# Patient Record
Sex: FEMALE | Race: WHITE | NOT HISPANIC OR LATINO | Employment: UNEMPLOYED | URBAN - METROPOLITAN AREA
[De-identification: names, ages, dates, MRNs, and addresses within clinical notes are randomized per-mention and may not be internally consistent; named-entity substitution may affect disease eponyms.]

---

## 2020-12-16 ENCOUNTER — OFFICE VISIT - HEALTHEAST (OUTPATIENT)
Dept: FAMILY MEDICINE | Facility: CLINIC | Age: 60
End: 2020-12-16

## 2020-12-16 DIAGNOSIS — J31.0 CHRONIC RHINITIS: ICD-10-CM

## 2020-12-16 DIAGNOSIS — Z90.49 S/P CHOLECYSTECTOMY: ICD-10-CM

## 2020-12-16 DIAGNOSIS — E03.8 OTHER SPECIFIED HYPOTHYROIDISM: ICD-10-CM

## 2020-12-16 DIAGNOSIS — G40.A09 NONINTRACTABLE ABSENCE EPILEPSY WITHOUT STATUS EPILEPTICUS (H): ICD-10-CM

## 2020-12-16 DIAGNOSIS — R06.02 SHORTNESS OF BREATH: ICD-10-CM

## 2020-12-16 RX ORDER — CARBAMAZEPINE 200 MG/1
200 TABLET ORAL 2 TIMES DAILY
Qty: 180 TABLET | Refills: 1 | Status: SHIPPED | OUTPATIENT
Start: 2020-12-16 | End: 2021-06-16

## 2020-12-16 ASSESSMENT — MIFFLIN-ST. JEOR: SCORE: 1158.29

## 2021-01-18 ENCOUNTER — COMMUNICATION - HEALTHEAST (OUTPATIENT)
Dept: FAMILY MEDICINE | Facility: CLINIC | Age: 61
End: 2021-01-18

## 2021-01-18 DIAGNOSIS — R06.02 SHORTNESS OF BREATH: ICD-10-CM

## 2021-01-18 RX ORDER — ALBUTEROL SULFATE 90 UG/1
AEROSOL, METERED RESPIRATORY (INHALATION)
Qty: 8.5 G | Refills: 1 | Status: SHIPPED | OUTPATIENT
Start: 2021-01-18 | End: 2021-12-14

## 2021-02-24 ENCOUNTER — OFFICE VISIT - HEALTHEAST (OUTPATIENT)
Dept: FAMILY MEDICINE | Facility: CLINIC | Age: 61
End: 2021-02-24

## 2021-02-24 DIAGNOSIS — R20.8 BURNING SENSATION OF FEET: ICD-10-CM

## 2021-02-24 DIAGNOSIS — Z12.31 VISIT FOR SCREENING MAMMOGRAM: ICD-10-CM

## 2021-02-24 DIAGNOSIS — Z90.49 HISTORY OF CHOLECYSTECTOMY: ICD-10-CM

## 2021-02-24 DIAGNOSIS — R79.9 ABNORMAL FINDING OF BLOOD CHEMISTRY, UNSPECIFIED: ICD-10-CM

## 2021-02-24 DIAGNOSIS — E03.8 OTHER SPECIFIED HYPOTHYROIDISM: ICD-10-CM

## 2021-02-24 DIAGNOSIS — H61.21 IMPACTED CERUMEN OF RIGHT EAR: ICD-10-CM

## 2021-02-24 DIAGNOSIS — R79.9 ABNORMAL BLOOD CHEMISTRY TEST: ICD-10-CM

## 2021-02-24 LAB
ALBUMIN SERPL-MCNC: 4.1 G/DL (ref 3.5–5)
ALP SERPL-CCNC: 120 U/L (ref 45–120)
ALT SERPL W P-5'-P-CCNC: 19 U/L (ref 0–45)
ANION GAP SERPL CALCULATED.3IONS-SCNC: 12 MMOL/L (ref 5–18)
AST SERPL W P-5'-P-CCNC: 23 U/L (ref 0–40)
BILIRUB SERPL-MCNC: 0.2 MG/DL (ref 0–1)
BUN SERPL-MCNC: 10 MG/DL (ref 8–22)
CALCIUM SERPL-MCNC: 8.7 MG/DL (ref 8.5–10.5)
CHLORIDE BLD-SCNC: 104 MMOL/L (ref 98–107)
CO2 SERPL-SCNC: 26 MMOL/L (ref 22–31)
CREAT SERPL-MCNC: 0.94 MG/DL (ref 0.6–1.1)
ERYTHROCYTE [DISTWIDTH] IN BLOOD BY AUTOMATED COUNT: 13.5 % (ref 11–14.5)
GFR SERPL CREATININE-BSD FRML MDRD: >60 ML/MIN/1.73M2
GLUCOSE BLD-MCNC: 100 MG/DL (ref 70–125)
HBA1C MFR BLD: 5.1 %
HCT VFR BLD AUTO: 42.7 % (ref 35–47)
HGB BLD-MCNC: 13.6 G/DL (ref 12–16)
MCH RBC QN AUTO: 32 PG (ref 27–34)
MCHC RBC AUTO-ENTMCNC: 31.9 G/DL (ref 32–36)
MCV RBC AUTO: 101 FL (ref 80–100)
PLATELET # BLD AUTO: 160 THOU/UL (ref 140–440)
PMV BLD AUTO: 10.3 FL (ref 7–10)
POTASSIUM BLD-SCNC: 4 MMOL/L (ref 3.5–5)
PROT SERPL-MCNC: 7.4 G/DL (ref 6–8)
RBC # BLD AUTO: 4.25 MILL/UL (ref 3.8–5.4)
SODIUM SERPL-SCNC: 142 MMOL/L (ref 136–145)
TSH SERPL DL<=0.005 MIU/L-ACNC: 3.37 UIU/ML (ref 0.3–5)
VIT B12 SERPL-MCNC: 356 PG/ML (ref 213–816)
WBC: 6.1 THOU/UL (ref 4–11)

## 2021-03-01 ENCOUNTER — COMMUNICATION - HEALTHEAST (OUTPATIENT)
Dept: SCHEDULING | Facility: CLINIC | Age: 61
End: 2021-03-01

## 2021-03-04 ENCOUNTER — COMMUNICATION - HEALTHEAST (OUTPATIENT)
Dept: FAMILY MEDICINE | Facility: CLINIC | Age: 61
End: 2021-03-04

## 2021-03-08 ENCOUNTER — OFFICE VISIT - HEALTHEAST (OUTPATIENT)
Dept: FAMILY MEDICINE | Facility: CLINIC | Age: 61
End: 2021-03-08

## 2021-03-08 DIAGNOSIS — E03.8 OTHER SPECIFIED HYPOTHYROIDISM: ICD-10-CM

## 2021-03-22 ENCOUNTER — COMMUNICATION - HEALTHEAST (OUTPATIENT)
Dept: FAMILY MEDICINE | Facility: CLINIC | Age: 61
End: 2021-03-22

## 2021-03-28 ENCOUNTER — COMMUNICATION - HEALTHEAST (OUTPATIENT)
Dept: FAMILY MEDICINE | Facility: CLINIC | Age: 61
End: 2021-03-28

## 2021-03-28 DIAGNOSIS — J31.0 CHRONIC RHINITIS: ICD-10-CM

## 2021-03-29 RX ORDER — CETIRIZINE HYDROCHLORIDE 10 MG/1
TABLET ORAL
Qty: 90 TABLET | Refills: 3 | Status: SHIPPED | OUTPATIENT
Start: 2021-03-29 | End: 2022-03-17

## 2021-04-23 ENCOUNTER — COMMUNICATION - HEALTHEAST (OUTPATIENT)
Dept: FAMILY MEDICINE | Facility: CLINIC | Age: 61
End: 2021-04-23

## 2021-04-23 DIAGNOSIS — F41.1 GENERALIZED ANXIETY DISORDER: ICD-10-CM

## 2021-04-23 RX ORDER — HYDROXYZINE PAMOATE 25 MG/1
25 CAPSULE ORAL 3 TIMES DAILY PRN
Qty: 60 CAPSULE | Refills: 0 | Status: SHIPPED | OUTPATIENT
Start: 2021-04-23 | End: 2021-06-16

## 2021-05-09 ENCOUNTER — COMMUNICATION - HEALTHEAST (OUTPATIENT)
Dept: FAMILY MEDICINE | Facility: CLINIC | Age: 61
End: 2021-05-09

## 2021-05-09 DIAGNOSIS — E03.8 OTHER SPECIFIED HYPOTHYROIDISM: ICD-10-CM

## 2021-05-10 RX ORDER — LEVOTHYROXINE SODIUM 50 MCG
TABLET ORAL
Qty: 90 TABLET | Refills: 2 | Status: SHIPPED | OUTPATIENT
Start: 2021-05-10 | End: 2022-02-01

## 2021-06-05 VITALS
DIASTOLIC BLOOD PRESSURE: 84 MMHG | TEMPERATURE: 98.1 F | HEART RATE: 87 BPM | WEIGHT: 147 LBS | BODY MASS INDEX: 28.86 KG/M2 | SYSTOLIC BLOOD PRESSURE: 126 MMHG | HEIGHT: 60 IN | RESPIRATION RATE: 20 BRPM

## 2021-06-05 VITALS
HEART RATE: 105 BPM | BODY MASS INDEX: 28.51 KG/M2 | WEIGHT: 146 LBS | DIASTOLIC BLOOD PRESSURE: 87 MMHG | SYSTOLIC BLOOD PRESSURE: 120 MMHG

## 2021-06-13 NOTE — PROGRESS NOTES
ASSESSMENT/PLAN:  1. Chronic rhinitis  cetirizine (ZYRTEC) 10 MG tablet   2. Shortness of breath  albuterol (PROAIR HFA;PROVENTIL HFA;VENTOLIN HFA) 90 mcg/actuation inhaler   3. Nonintractable absence epilepsy without status epilepticus (H)  carBAMazepine (TEGRETOL) 200 mg tablet    KEPPRA 1,000 mg tablet   4. Other specified hypothyroidism  levothyroxine (SYNTHROID, LEVOTHROID) 50 MCG tablet   5. S/P cholecystectomy         This is a 59 yo female - new to our clinic - here for:  1.  Chronic rhinitis - patient has used OTC medications in past - perhaps Claritin - sometimes it is helpful, sometimes not.  Will switch to Cetirizine.  2.  Shortness of breath - denies true asthma - has used Albuterol successfully in past - uses rarely.   3.  Epilepsy - apparently presented with what sounds like a grand mal, years ago, but now tells me it is petit mal - denies any recent seizure activity.   4.  Hypothyroid - on Levothyroxine - will need ongoing monitoring  5.  Patient had cholecystectomy - still has CBD stent - will arrange for removal.    Return in about 3 months (around 3/16/2021) for Recheck.      Medications Discontinued During This Encounter   Medication Reason     albuterol (PROAIR HFA;PROVENTIL HFA;VENTOLIN HFA) 90 mcg/actuation inhaler Reorder     KEPPRA 1,000 mg tablet Reorder     levothyroxine (SYNTHROID, LEVOTHROID) 50 MCG tablet Reorder     carBAMazepine (TEGRETOL) 200 mg tablet Reorder     There are no Patient Instructions on file for this visit.    Chief Complaint:  Chief Complaint   Patient presents with     Follow-up     gallbadder surgery     Medication Refill       HPI:   Kerry Reed is a 60 y.o. female c/o  New patient to us   Had   Time release Keppra/generic does NOT work for her - needs RUDY Keppra  Last filled September     Had been sick off/on couple months -   Then 10 days - ill, not eating, diarrhea, throwing up  Went to hospital - ER [facility] - had cholecystectomy immediately  Has stent in  "gallbladder - 8/19/2020 -   Had an appointment but didn't understand she was to have procedure to get stent out   \"Kerry Reed is a 60 y.o. female with a history of epilepsy, developmental challenges, hypothyroidism s/p hysterectomy and left oophorectomy who underwent laparoscopic cholecystectomy and laparoscopic adesiolysis for acute cholecystitis and choledocholithiasis on 8/21/2020. She also underwent ERCP with GI on 8/20 with stenting of CBD prior to surgery.\"    Has bad belching, lots of gas - IBS symptoms  Ever since her hysterectomy - more dry skin -   Cervical cancer - years ago - Jessica was 25 (she's 40 now) - is doing fine now -     Had a boyfriend for 35 years - he is 26 years older than her -      Had prescribed Claritin - uses benadryl at night   Gets bad headaches from sinuses  Feels like an ear infection    Usually has low blood pressure - lives a block away from here -     Avoids eating fatty foods    PMH:   Patient Active Problem List    Diagnosis Date Noted     S/P cholecystectomy 12/20/2020     Shortness of breath 12/16/2020     Nonintractable absence epilepsy without status epilepticus (H) 12/16/2020     Other specified hypothyroidism 12/16/2020     No past medical history on file.  No past surgical history on file.  Social History     Socioeconomic History     Marital status: Single     Spouse name: Not on file     Number of children: Not on file     Years of education: Not on file     Highest education level: Not on file   Occupational History     Not on file   Social Needs     Financial resource strain: Not on file     Food insecurity     Worry: Not on file     Inability: Not on file     Transportation needs     Medical: Not on file     Non-medical: Not on file   Tobacco Use     Smoking status: Former Smoker     Smokeless tobacco: Never Used     Tobacco comment: vape   Substance and Sexual Activity     Alcohol use: Not on file     Drug use: Not on file     Sexual activity: Not on file "   Lifestyle     Physical activity     Days per week: Not on file     Minutes per session: Not on file     Stress: Not on file   Relationships     Social connections     Talks on phone: Not on file     Gets together: Not on file     Attends Confucianist service: Not on file     Active member of club or organization: Not on file     Attends meetings of clubs or organizations: Not on file     Relationship status: Not on file     Intimate partner violence     Fear of current or ex partner: Not on file     Emotionally abused: Not on file     Physically abused: Not on file     Forced sexual activity: Not on file   Other Topics Concern     Not on file   Social History Narrative     Not on file     No family history on file.    Meds:    Current Outpatient Medications:      albuterol (PROAIR HFA;PROVENTIL HFA;VENTOLIN HFA) 90 mcg/actuation inhaler, Inhale 2 puffs every 4 (four) hours as needed for wheezing., Disp: 8.5 g, Rfl: 1     carBAMazepine (TEGRETOL) 200 mg tablet, Take 1 tablet (200 mg total) by mouth 2 (two) times a day., Disp: 180 tablet, Rfl: 1     KEPPRA 1,000 mg tablet, Take 1 tablet (1,000 mg total) by mouth 2 (two) times a day., Disp: 180 tablet, Rfl: 1     levothyroxine (SYNTHROID, LEVOTHROID) 50 MCG tablet, Take 1 tablet (50 mcg total) by mouth daily., Disp: 90 tablet, Rfl: 1     cetirizine (ZYRTEC) 10 MG tablet, Take 1 tablet (10 mg total) by mouth daily., Disp: 30 tablet, Rfl: 2    Allergies:  No Known Allergies    ROS:  Pertinent positives as noted in HPI; otherwise 12 point ROS negative.      Physical Exam:  EXAM:  /84   Pulse 87   Temp 98.1  F (36.7  C) (Tympanic)   Resp 20   Ht 5' (1.524 m)   Wt 147 lb (66.7 kg)   BMI 28.71 kg/m     Gen:  NAD, appears well, well-hydrated  HEENT:  TMs nl, oropharynx benign, nasal mucosa nl, conjunctiva clear  Neck:  Supple, no adenopathy, no thyromegaly, no carotid bruits, no JVD  Lungs:  Clear to auscultation bilaterally  Cor:  RRR no murmur  Abd:  Soft,  nontender, BS+, no masses, no guarding or rebound, no HSM  Extr:  Neg.  Neuro:  No asymmetry  Skin:  Warm/dry        Results:  No results found for this or any previous visit.

## 2021-06-15 NOTE — TELEPHONE ENCOUNTER
Patient calling to get her results from her blood work.  She was read the noter per TUCKER Coy , and she was advised to make a telephone appointment with Dr. Gutiérrez if she does not want to continue taking her thyroid medication. She states the synthroid medication makes her feet hurn. She was sent to scheduling to make an appointment with Dr. Gutiérrez.    Theresa Garcias RN  [facility] Triage/refill nurse    Reason for Disposition    Caller requesting lab results    Protocols used: PCP CALL - NO TRIAGE-A-

## 2021-06-15 NOTE — TELEPHONE ENCOUNTER
Called and was going to relay the message to her but per pt said that she was already informed of her labs.

## 2021-06-15 NOTE — TELEPHONE ENCOUNTER
----- Message from Yun Coy PA-C sent at 2/28/2021  6:35 PM CST -----  All her tests are normal.  Normal blood sugars, normal kidney and liver tests, normal blood cell counts, normal vitamin B12 level.  I put in a referral for her to see GI to follow-up on her stent.  If no one calls her within 1 week to schedule the appointment, let us know.  It is very important that she keep taking her thyroid medicine.  If she refuses this, she should make an appointment with her PCP Dr Gutiérrez to discuss, virtual visit Ok.

## 2021-06-15 NOTE — PROGRESS NOTES
"  Subjective:      Kerry Reed is a 60 y.o. female with chief complaint of several concerns:    1.  Side effects from levothyroxine.  She has been taking levothyroxine, same dose, for many years.  For the past 3 to 4 days she has noticed \"blisters\" forming on her feet, and burning sensation of her feet.  These symptoms are worst at night when she is trying to sleep.  She has 2 specific \"blister\" areas on the insides of both of her ankles that are healing.  She read online that her Tegretol combined with levothyroxine would cause this specific side effect.  She is insistent that she is not going to take her thyroid medicine anymore.    2.  Ear pain.    She has had some pain off and on in the right ear.  She had does have a history of earwax.  She is wondering if she has earwax in the right ear today.    She had her gallbladder removed several months ago.  Patient's family who is with her states that patient still has a stent placed from the surgery.  She is wondering if this needs to be removed or followed up on.    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 status epilepticus (H)     Other specified hypothyroidism     S/P cholecystectomy     Chondromalacia patellae     Osteoarthritis, knee     Generalized anxiety disorder     History of cervical cancer     Other specified menopausal and postmenopausal disorder     Tobacco use disorder       Current Outpatient Medications:      albuterol (PROAIR HFA;PROVENTIL HFA;VENTOLIN HFA) 90 mcg/actuation inhaler, INHALE 2 PUFFS BY MOUTH EVERY 4 HOURS AS NEEDED FOR WHEEZING, Disp: 8.5 g, Rfl: 1     carBAMazepine (TEGRETOL) 200 mg tablet, Take 1 tablet (200 mg total) by mouth 2 (two) times a day., Disp: 180 tablet, Rfl: 1     cetirizine (ZYRTEC) 10 MG tablet, Take 1 tablet (10 mg total) by mouth daily., Disp: 30 tablet, Rfl: 2     KEPPRA 1,000 mg tablet, Take 1 tablet (1,000 mg total) by mouth 2 (two) times a " day., Disp: 180 tablet, Rfl: 1     levothyroxine (SYNTHROID, LEVOTHROID) 50 MCG tablet, Take 1 tablet (50 mcg total) by mouth daily., Disp: 90 tablet, Rfl: 1      Tobacco Use      Smoking status: Former Smoker      Smokeless tobacco: Never Used      Tobacco comment: vape       Objective:     Allergies:  Penicillins, Azithromycin, Citalopram, Erythromycin, and Venlafaxine    Vitals:  Vitals:    02/24/21 1152   BP: 120/87   Pulse: (!) 105     Body mass index is 28.51 kg/m .    Vital signs reviewed.  General: Patient is alert and oriented x 3, in no apparent distress  Ears: Right ear canal was obscured by cerumen, I attempted to use a curette to remove wax and failed, my assistant then did ear lavage and cleared ear  Left ear TM is normal, canal clear, no cerumen  Cardiac: regular rate and rhythm, no murmurs  Pulmonary: lungs clear to auscultation bilaterally, no crackles, rales, rhonchi, or wheezing noted  Skin: Minimal scattered minimally erythematous spots on her lower extremities.  She has 2 shallow ulcer type lesions about 3 cm in diameter, 1 on each on the right and left medial malleoli  These appear to be healing well, no edema in the feet or ankles, no other abnormalities noted    Results for orders placed or performed in visit on 02/24/21   HM2(CBC w/o Differential)   Result Value Ref Range    WBC 6.1 4.0 - 11.0 thou/uL    RBC 4.25 3.80 - 5.40 mill/uL    Hemoglobin 13.6 12.0 - 16.0 g/dL    Hematocrit 42.7 35.0 - 47.0 %     (H) 80 - 100 fL    MCH 32.0 27.0 - 34.0 pg    MCHC 31.9 (L) 32.0 - 36.0 g/dL    RDW 13.5 11.0 - 14.5 %    Platelets 160 140 - 440 thou/uL    MPV 10.3 (H) 7.0 - 10.0 fL   Glycosylated Hemoglobin A1c   Result Value Ref Range    Hemoglobin A1c 5.1 <=5.6 %   Other labs pending.    Assessment and Plan:   1. Burning sensation of feet  Differential includes vitamin deficiency, neuropathy, restless leg syndrome, claudication, versus other.  Patient states she does not want to take her thyroid  medicine anymore because she thinks that is causing her burning sensation.  See #2 for further discussion of that.  She is vaping a lot.  No other obvious triggers for skin irritation.  If screening labs are normal, could consider referral to either neurology or podiatry.  She does have 2 fairly superficial well-healing abrasions on the malleolus on both inner ankles.  We discussed continued symptomatic treatment.  Does appear to be healing relatively well.  - Thyroid Cascade  - HM2(CBC w/o Differential)  - Vitamin B12  - Glycosylated Hemoglobin A1c  - Comprehensive Metabolic Panel    2. Other specified hypothyroidism  Has not had her thyroid checked in probably 6 months.  She has been taking her thyroid medicine regularly, up until 3 or 4 days ago.  She is sure that levothyroxine is causing her feet to burn and get blisters, even though she has been taking levothyroxine for many years.  She also insists that her levothyroxine interacts with her Tegretol, and that is what is causing these unwanted side effects.  I reviewed specifically with her that these do not have any known adverse interactions.  She continues to refuse to take her levothyroxine.  I reviewed that we do not have any alternative medicines to use.  I discussed what would happen if she stopped her thyroid medicine, including fatigue, weight gain, constipation, and potential for heart issues.  - Thyroid Cascade    3. Impacted cerumen of right ear  Cerumen was cleared from her right ear today.  Follow-up as needed.  - Ear wax removal    4. History of Cholecystectomy  I reviewed surgeon's pos-op visit note.  She has not followed up with GI for possible ERCP and stent removal.  Referral placed today.    5. Visit for screening mammogram  She declined scheduling a mammogram today.  - Mammo Screening Bilateral; Future    35 minutes spent on the date of the encounter doing chart review, history and exam, and documentation.      This dictation uses voice  recognition software, which may contain typographical errors.

## 2021-06-15 NOTE — PROGRESS NOTES
Kerry Reed is a 60 y.o. female who is being evaluated via a billable telephone visit.      What phone number would you like to be contacted at? [phone]  How would you like to obtain your AVS? AVS Preference: Mail a copy.    ASSESSMENT/PLAN:  1. Other specified hypothyroidism  SYNTHROID 50 mcg tablet       This is a 59 yo female with hypothyroidism.  She would like to discontinue her Levothyroxine.  She is certain this interferes with her anticonvulsant medication and that she has-8 weeks.  itchy burning feet from this.  We discussed that there aren't very many substitutes for thyroid replacement therapy.  Will try brand name synthroid.  Recheck levels in 6   Return in about 2 months (around 5/8/2021) for Recheck thyroid.      Medications Discontinued During This Encounter   Medication Reason     levothyroxine (SYNTHROID, LEVOTHROID) 50 MCG tablet Alternate therapy     There are no Patient Instructions on file for this visit.    Chief Complaint:  Chief Complaint   Patient presents with     Follow-up     thyroid medication       HPI:   Kerry Reed is a 60 y.o. female c/o  Thinks she   Had accident - has broken knee - fell down and blew out her knee - dog knocked her off the porch -   Replaced a bone - put pins in it -     Never had itchy, burning feet at all in her life   Is certain that medication is reacting -   Stopped her thyroid medication - because couldn't sleep -       PMH:   Patient Active Problem List    Diagnosis Date Noted     S/P cholecystectomy 12/20/2020     Shortness of breath 12/16/2020     Nonintractable absence epilepsy without status epilepticus (H) 12/16/2020     Other specified hypothyroidism 12/16/2020     History of cervical cancer 09/19/2014     Osteoarthritis, knee 01/08/2013     Chondromalacia patellae 06/23/2010     Generalized anxiety disorder 02/02/2010     Tobacco use disorder 09/30/2009     Other specified menopausal and postmenopausal disorder 05/21/2009     History  reviewed. No pertinent past medical history.  History reviewed. No pertinent surgical history.  Social History     Socioeconomic History     Marital status: Single     Spouse name: Not on file     Number of children: Not on file     Years of education: Not on file     Highest education level: Not on file   Occupational History     Not on file   Social Needs     Financial resource strain: Not on file     Food insecurity     Worry: Not on file     Inability: Not on file     Transportation needs     Medical: Not on file     Non-medical: Not on file   Tobacco Use     Smoking status: Former Smoker     Smokeless tobacco: Never Used     Tobacco comment: vape   Substance and Sexual Activity     Alcohol use: Not on file     Drug use: Not on file     Sexual activity: Not on file   Lifestyle     Physical activity     Days per week: Not on file     Minutes per session: Not on file     Stress: Not on file   Relationships     Social connections     Talks on phone: Not on file     Gets together: Not on file     Attends Hoahaoism service: Not on file     Active member of club or organization: Not on file     Attends meetings of clubs or organizations: Not on file     Relationship status: Not on file     Intimate partner violence     Fear of current or ex partner: Not on file     Emotionally abused: Not on file     Physically abused: Not on file     Forced sexual activity: Not on file   Other Topics Concern     Not on file   Social History Narrative     Not on file     History reviewed. No pertinent family history.    Meds:    Current Outpatient Medications:      albuterol (PROAIR HFA;PROVENTIL HFA;VENTOLIN HFA) 90 mcg/actuation inhaler, INHALE 2 PUFFS BY MOUTH EVERY 4 HOURS AS NEEDED FOR WHEEZING, Disp: 8.5 g, Rfl: 1     carBAMazepine (TEGRETOL) 200 mg tablet, Take 1 tablet (200 mg total) by mouth 2 (two) times a day., Disp: 180 tablet, Rfl: 1     cetirizine (ZYRTEC) 10 MG tablet, Take 1 tablet (10 mg total) by mouth daily., Disp:  "30 tablet, Rfl: 2     KEPPRA 1,000 mg tablet, Take 1 tablet (1,000 mg total) by mouth 2 (two) times a day., Disp: 180 tablet, Rfl: 1     SYNTHROID 50 mcg tablet, Take 1 tablet (50 mcg total) by mouth daily., Disp: 30 tablet, Rfl: 1    Allergies:  Allergies   Allergen Reactions     Penicillins Hives     Azithromycin Rash     Citalopram Other (See Comments)     \"made me feel weird and spacey\"       Erythromycin Rash     Venlafaxine Other (See Comments)     \"zombie\"         ROS:  Pertinent positives as noted in HPI; otherwise 12 point ROS negative.      Physical Exam:  EXAM:  There were no vitals taken for this visit.     This is a telephone visit      Results:  Results for orders placed or performed in visit on 02/24/21   Thyroid Cascade   Result Value Ref Range    TSH 3.37 0.30 - 5.00 uIU/mL   HM2(CBC w/o Differential)   Result Value Ref Range    WBC 6.1 4.0 - 11.0 thou/uL    RBC 4.25 3.80 - 5.40 mill/uL    Hemoglobin 13.6 12.0 - 16.0 g/dL    Hematocrit 42.7 35.0 - 47.0 %     (H) 80 - 100 fL    MCH 32.0 27.0 - 34.0 pg    MCHC 31.9 (L) 32.0 - 36.0 g/dL    RDW 13.5 11.0 - 14.5 %    Platelets 160 140 - 440 thou/uL    MPV 10.3 (H) 7.0 - 10.0 fL   Vitamin B12   Result Value Ref Range    Vitamin B-12 356 213 - 816 pg/mL   Comprehensive Metabolic Panel   Result Value Ref Range    Sodium 142 136 - 145 mmol/L    Potassium 4.0 3.5 - 5.0 mmol/L    Chloride 104 98 - 107 mmol/L    CO2 26 22 - 31 mmol/L    Anion Gap, Calculation 12 5 - 18 mmol/L    Glucose 100 70 - 125 mg/dL    BUN 10 8 - 22 mg/dL    Creatinine 0.94 0.60 - 1.10 mg/dL    GFR MDRD Af Amer >60 >60 mL/min/1.73m2    GFR MDRD Non Af Amer >60 >60 mL/min/1.73m2    Bilirubin, Total 0.2 0.0 - 1.0 mg/dL    Calcium 8.7 8.5 - 10.5 mg/dL    Protein, Total 7.4 6.0 - 8.0 g/dL    Albumin 4.1 3.5 - 5.0 g/dL    Alkaline Phosphatase 120 45 - 120 U/L    AST 23 0 - 40 U/L    ALT 19 0 - 45 U/L   Glycosylated Hemoglobin A1c   Result Value Ref Range    Hemoglobin A1c 5.1 <=5.6 % "               Phone call duration: 9:33 minutes

## 2021-06-16 ENCOUNTER — COMMUNICATION - HEALTHEAST (OUTPATIENT)
Dept: FAMILY MEDICINE | Facility: CLINIC | Age: 61
End: 2021-06-16

## 2021-06-16 DIAGNOSIS — F41.1 GENERALIZED ANXIETY DISORDER: ICD-10-CM

## 2021-06-16 DIAGNOSIS — G40.A09 NONINTRACTABLE ABSENCE EPILEPSY WITHOUT STATUS EPILEPTICUS (H): ICD-10-CM

## 2021-06-16 PROBLEM — R06.02 SHORTNESS OF BREATH: Status: ACTIVE | Noted: 2020-12-16

## 2021-06-16 PROBLEM — E03.8 OTHER SPECIFIED HYPOTHYROIDISM: Status: ACTIVE | Noted: 2020-12-16

## 2021-06-16 PROBLEM — Z90.49 S/P CHOLECYSTECTOMY: Status: ACTIVE | Noted: 2020-12-20

## 2021-06-16 RX ORDER — HYDROXYZINE PAMOATE 25 MG/1
25 CAPSULE ORAL 3 TIMES DAILY PRN
Qty: 60 CAPSULE | Refills: 0 | Status: SHIPPED | OUTPATIENT
Start: 2021-06-16 | End: 2021-08-06

## 2021-06-16 NOTE — TELEPHONE ENCOUNTER
Reason for Call:  Medication or medication refill:    Do you use a [facility]?  Name of the pharmacy and phone number for the current request: [facility] [phone]    Name of the medication requested: Hydroxyzinepamoate 25 mg    Other request: pt was in [facility] x3 days in February 2021 for a broken leg  she was given this RX and would like to renew it for her anxiety   Can we leave a detailed message on this number? Yes    Phone number patient can be reached at:   Cell number on file:    Telephone Information:   Mobile [phone]       Best Time: anytime    Call taken on 4/23/2021 at 10:47 AM by Elsa Mcgarry

## 2021-06-16 NOTE — TELEPHONE ENCOUNTER
Per pt ok to wait for Dr. Gutiérrez. Dr. Gutiérrez, per pt does not have the [facility] form nor a computer to print off the application form but if you are willing to fill it out, we can print it off for you. Although per pt said that she won't be able to come in to fill out and sign the patient portion due to her broken leg. Per pt said that she had a broken leg 2 weeks ago and is currently seeing the specialty care clinic on [address]. Please advise. Thanks.

## 2021-06-16 NOTE — TELEPHONE ENCOUNTER
Reason for Call:  Other call back      Detailed comments: pt has a broken leg she would like to get [facility] how does she get those services     Phone Number Patient can be reached at: Home number on file [phone] (home)    Best Time: anytime  Can we leave a detailed message on this number?: Yes    Call taken on 3/22/2021 at 11:50 AM by Elsa Mcgarry

## 2021-06-16 NOTE — TELEPHONE ENCOUNTER
Refill Approved    Rx renewed per Medication Renewal Policy. Medication was last renewed on 12/16/20.    Justice Brownlee, [facility] Triage/Med Refill 3/29/2021     Requested Prescriptions   Pending Prescriptions Disp Refills     cetirizine (ZYRTEC) 10 MG tablet [Pharmacy Med Name: CETIRIZINE 10MG TABLETS] 30 tablet 2     Sig: TAKE 1 TABLET(10 MG) BY MOUTH DAILY       Antihistamine Refill Protocol Passed - 3/28/2021  3:53 AM        Passed - Patient has had office visit/physical in last year     Last office visit with prescriber/PCP: 12/16/2020 Ada Espinoza MD OR same dept: 2/24/2021 Yun Coy PA-C OR same specialty: 2/24/2021 Yun Coy PA-C  Last physical: Visit date not found Last MTM visit: Visit date not found   Next visit within 3 mo: Visit date not found  Next physical within 3 mo: Visit date not found  Prescriber OR PCP: Ada Espinoza MD  Last diagnosis associated with med order: 1. Chronic rhinitis  - cetirizine (ZYRTEC) 10 MG tablet [Pharmacy Med Name: CETIRIZINE 10MG TABLETS]; TAKE 1 TABLET(10 MG) BY MOUTH DAILY  Dispense: 30 tablet; Refill: 2    If protocol passes may refill for 12 months if within 3 months of last provider visit (or a total of 15 months).

## 2021-06-17 ENCOUNTER — COMMUNICATION - HEALTHEAST (OUTPATIENT)
Dept: FAMILY MEDICINE | Facility: CLINIC | Age: 61
End: 2021-06-17

## 2021-06-17 DIAGNOSIS — G40.A09 NONINTRACTABLE ABSENCE EPILEPSY WITHOUT STATUS EPILEPTICUS (H): ICD-10-CM

## 2021-06-17 NOTE — TELEPHONE ENCOUNTER
Refill Approved    Rx renewed per Medication Renewal Policy. Medication was last renewed on 03/08/21. Last OV 03/08/21    Raquel Mcgarry [facility] Triage/Med Refill 5/10/2021     Requested Prescriptions   Pending Prescriptions Disp Refills     SYNTHROID 50 mcg tablet [Pharmacy Med Name: SYNTHROID 0.05MG (50MCG)TABLETS] 30 tablet 1     Sig: TAKE 1 TABLET(50 MCG) BY MOUTH DAILY       Thyroid Hormones Protocol Passed - 5/9/2021  3:52 AM        Passed - Provider visit in past 12 months or next 3 months     Last office visit with prescriber/PCP: 12/16/2020 Ada Espinoza MD OR same dept: 2/24/2021 Yun Coy PA-C OR same specialty: 2/24/2021 Yun Coy PA-C  Last physical: Visit date not found Last MTM visit: Visit date not found   Next visit within 3 mo: Visit date not found  Next physical within 3 mo: Visit date not found  Prescriber OR PCP: Ada Espinoza MD  Last diagnosis associated with med order: 1. Other specified hypothyroidism  - SYNTHROID 50 mcg tablet [Pharmacy Med Name: SYNTHROID 0.05MG (50MCG)TABLETS]; TAKE 1 TABLET(50 MCG) BY MOUTH DAILY  Dispense: 30 tablet; Refill: 1    If protocol passes may refill for 12 months if within 3 months of last provider visit (or a total of 15 months).             Passed - TSH on file in past 12 months for patient age 12 & older     TSH   Date Value Ref Range Status   02/24/2021 3.37 0.30 - 5.00 uIU/mL Final

## 2021-06-18 RX ORDER — CARBAMAZEPINE 200 MG/1
TABLET ORAL
Qty: 180 TABLET | Refills: 1 | Status: SHIPPED | OUTPATIENT
Start: 2021-06-18 | End: 2021-12-14

## 2021-06-25 NOTE — TELEPHONE ENCOUNTER
Reason for Call:  Medication or medication refill:    Do you use a [facility]?  Name of the pharmacy and phone number for the current request: [facility] [address]    Name of the medication requested: keppra, carbanazine and hydroxyzine    Other request: she said the pharmacy told her she had to call for refills    Can we leave a detailed message on this number? Yes    Phone number patient can be reached at: Home number on file [phone] (home)    Best Time: any    Call taken on 6/16/2021 at 2:01 PM by Mirta Beyer

## 2021-06-26 NOTE — TELEPHONE ENCOUNTER
RN cannot approve Refill Request    RN can NOT refill this medication PCP messaged that patient is overdue for Labs. Last office visit: 12/16/2020 Ada Espinoza MD Last Physical: Visit date not found Last MTM visit: Visit date not found Last visit same specialty: 2/24/2021 Yun Coy PA-C.  Next visit within 3 mo: Visit date not found  Next physical within 3 mo: Visit date not found      Sybil Zarate, [facility] Triage/Med Refill 6/17/2021    Requested Prescriptions   Pending Prescriptions Disp Refills     carBAMazepine (TEGRETOL) 200 mg tablet [Pharmacy Med Name: CARBAMAZEPINE 200MG TABLETS] 180 tablet 1     Sig: TAKE 1 TABLET(200 MG) BY MOUTH TWICE DAILY       Carbamazepine Refill Protocol  Failed - 6/17/2021 11:18 AM        Failed - Fasting lipid cascade in last 12 months     No results found for: CHOL, TRIG, HDL, LDLCALC, FASTING          Failed - Iron level in last 12 months     No results found for: IRON             Failed - Carbamazepine level in last 12 months     No results found for: CBMZ, CBMZFREE          Failed - Reticulocytes in last 12 months     No results found for: RETIC          Failed - Urinalysis in last 12 months     No results found for: GLUCOSEU, BILIRUBINUR, KETONESU, SPECGRAV, HGBUA, PHUR, PROTEINUA, UROBILINOGEN, NITRITE, LEUKOCYTESUR                Passed - LFT or AST or ALT in last 12 months     Albumin   Date Value Ref Range Status   02/24/2021 4.1 3.5 - 5.0 g/dL Final     Bilirubin, Total   Date Value Ref Range Status   02/24/2021 0.2 0.0 - 1.0 mg/dL Final     Alkaline Phosphatase   Date Value Ref Range Status   02/24/2021 120 45 - 120 U/L Final     AST   Date Value Ref Range Status   02/24/2021 23 0 - 40 U/L Final     ALT   Date Value Ref Range Status   02/24/2021 19 0 - 45 U/L Final     Protein, Total   Date Value Ref Range Status   02/24/2021 7.4 6.0 - 8.0 g/dL Final                Passed - CBC w/platelets (Hm2) in last 12 months     WBC    Date Value Ref Range Status   02/24/2021 6.1 4.0 - 11.0 thou/uL Final     Hemoglobin   Date Value Ref Range Status   02/24/2021 13.6 12.0 - 16.0 g/dL Final     Hematocrit   Date Value Ref Range Status   02/24/2021 42.7 35.0 - 47.0 % Final     Platelets   Date Value Ref Range Status   02/24/2021 160 140 - 440 thou/uL Final             Passed - BUN in last 12 months     BUN   Date Value Ref Range Status   02/24/2021 10 8 - 22 mg/dL Final                Passed - PCP or prescribing provider visit in last 12 months       Last office visit with prescriber/PCP: 12/16/2020 Ada Espinoza MD OR same dept: 2/24/2021 Yun Coy PA-C OR same specialty: 2/24/2021 Yun Coy PA-C  Last physical: Visit date not found Last MTM visit: Visit date not found   Next visit within 3 mo: Visit date not found  Next physical within 3 mo: Visit date not found  Prescriber OR PCP: Ada Espinoza MD  Last diagnosis associated with med order: 1. Nonintractable absence epilepsy without status epilepticus (H)  - carBAMazepine (TEGRETOL) 200 mg tablet [Pharmacy Med Name: CARBAMAZEPINE 200MG TABLETS]; TAKE 1 TABLET(200 MG) BY MOUTH TWICE DAILY  Dispense: 180 tablet; Refill: 1    If protocol passes may refill for 12 months if within 3 months of last provider visit (or a total of 15 months).             Passed - Sodium in last 12 months     Lab Results   Component Value Date    Sodium 142 02/24/2021             Passed - TSH in last 12 months     TSH   Date Value Ref Range Status   02/24/2021 3.37 0.30 - 5.00 uIU/mL Final

## 2021-06-28 ENCOUNTER — OFFICE VISIT - HEALTHEAST (OUTPATIENT)
Dept: FAMILY MEDICINE | Facility: CLINIC | Age: 61
End: 2021-06-28

## 2021-06-28 ENCOUNTER — COMMUNICATION - HEALTHEAST (OUTPATIENT)
Dept: FAMILY MEDICINE | Facility: CLINIC | Age: 61
End: 2021-06-28

## 2021-06-28 DIAGNOSIS — Z11.4 SCREENING FOR HUMAN IMMUNODEFICIENCY VIRUS WITHOUT PRESENCE OF RISK FACTORS: ICD-10-CM

## 2021-06-28 DIAGNOSIS — M62.838 MUSCLE SPASM: ICD-10-CM

## 2021-06-28 DIAGNOSIS — J31.0 CHRONIC RHINITIS: ICD-10-CM

## 2021-06-28 DIAGNOSIS — G40.A09 NONINTRACTABLE ABSENCE EPILEPSY WITHOUT STATUS EPILEPTICUS (H): ICD-10-CM

## 2021-06-28 DIAGNOSIS — E03.9 HYPOTHYROIDISM, UNSPECIFIED TYPE: ICD-10-CM

## 2021-06-28 DIAGNOSIS — R60.0 LOWER EXTREMITY EDEMA: ICD-10-CM

## 2021-06-28 DIAGNOSIS — Z13.220 LIPID SCREENING: ICD-10-CM

## 2021-06-28 DIAGNOSIS — Z12.11 SCREEN FOR COLON CANCER: ICD-10-CM

## 2021-06-28 DIAGNOSIS — Z11.59 ENCOUNTER FOR HEPATITIS C SCREENING TEST FOR LOW RISK PATIENT: ICD-10-CM

## 2021-06-28 LAB
CARBAMAZEPINE SERPL-MCNC: 7.4 UG/ML (ref 4–12)
CHOLEST SERPL-MCNC: 151 MG/DL
FASTING STATUS PATIENT QL REPORTED: YES
HDLC SERPL-MCNC: 46 MG/DL
HIV 1+2 AB+HIV1 P24 AG SERPL QL IA: NEGATIVE
LDLC SERPL CALC-MCNC: 89 MG/DL
LEVETIRACETAM (KEPPRA): 36.8 UG/ML (ref 6–46)
T4 FREE SERPL-MCNC: 0.8 NG/DL (ref 0.7–1.8)
TRIGL SERPL-MCNC: 80 MG/DL
TSH SERPL DL<=0.005 MIU/L-ACNC: 2.76 UIU/ML (ref 0.3–5)

## 2021-06-28 RX ORDER — TIZANIDINE HYDROCHLORIDE 2 MG/1
2 CAPSULE, GELATIN COATED ORAL
Qty: 30 CAPSULE | Refills: 1 | Status: SHIPPED | OUTPATIENT
Start: 2021-06-28 | End: 2021-08-04

## 2021-06-28 RX ORDER — TRIAMTERENE AND HYDROCHLOROTHIAZIDE 37.5; 25 MG/1; MG/1
1 CAPSULE ORAL DAILY PRN
Qty: 90 CAPSULE | Refills: 0 | Status: SHIPPED | OUTPATIENT
Start: 2021-06-28 | End: 2021-09-29

## 2021-06-28 RX ORDER — LORATADINE 10 MG/1
10 TABLET ORAL DAILY PRN
Qty: 30 TABLET | Refills: 11 | Status: SHIPPED | OUTPATIENT
Start: 2021-06-28 | End: 2022-03-28

## 2021-06-29 LAB — HCV AB SERPL QL IA: NEGATIVE

## 2021-06-30 ENCOUNTER — COMMUNICATION - HEALTHEAST (OUTPATIENT)
Dept: FAMILY MEDICINE | Facility: CLINIC | Age: 61
End: 2021-06-30

## 2021-07-04 NOTE — TELEPHONE ENCOUNTER
Telephone Encounter by Hermila Altman MA at 6/30/2021  8:16 AM     Author: Hermila Altman MA Service: -- Author Type: Medical Assistant    Filed: 6/30/2021  8:18 AM Encounter Date: 6/30/2021 Status: Signed    : Hermila Altman MA (Medical Assistant)       PA requested for Tizanidine 2 mg    [phone]  Pt Id [id number]

## 2021-07-04 NOTE — TELEPHONE ENCOUNTER
Telephone Encounter by Minoo Cárdenas at 7/1/2021  8:41 AM     Author: Minoo Cárdenas Service: -- Author Type: --    Filed: 7/1/2021  8:41 AM Encounter Date: 6/30/2021 Status: Signed    : Minoo Cárdenas APPROVED:    Approval start date: 04/01/2021  Approval end date:  06/30/2022    Pharmacy has been notified of approval and will contact patient when medication is ready for pickup.

## 2021-07-04 NOTE — TELEPHONE ENCOUNTER
Telephone Encounter by Minoo Cárdenas at 6/30/2021 10:41 AM     Author: Minoo Cárdenas Service: -- Author Type: --    Filed: 6/30/2021 10:42 AM Encounter Date: 6/30/2021 Status: Signed    : Minoo Cárdenas team  [phone]  Pool: [facility] ([id number])          PA has been initiated.       PA form completed and faxed insurance via Cover My Meds     Key:  [id number] - PA Case ID: [id number]     Medication:  tiZANidine HCl 2MG capsules    Insurance:  [facility]        Response will be received via fax and may take up to 5-10 business days depending on plan

## 2021-07-05 ENCOUNTER — COMMUNICATION - HEALTHEAST (OUTPATIENT)
Dept: FAMILY MEDICINE | Facility: CLINIC | Age: 61
End: 2021-07-05
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
## 2021-07-05 NOTE — TELEPHONE ENCOUNTER
Telephone Encounter by Mirta Beyer at 7/5/2021 10:20 AM     Author: Mirta Beyer Service: -- Author Type: Patient Access    Filed: 7/5/2021 10:21 AM Encounter Date: 7/5/2021 Status: Signed    : Mirta Beyer (Patient Access)       Reason for Call:  Medication or medication refill:    Do you use a [facility]?  Name of the pharmacy and phone number for the current request:     Name of the medication requested: zanaflex    Other request: patient is calling to let us know her pharmacy told the patient her insurance will not cover this rx?    Can we leave a detailed message on this number? Yes    Phone number patient can be reached at: Home number on file [phone] (home)    Best Time: any    Call taken on 7/5/2021 at 10:20 AM by Mirta Beyer

## 2021-07-06 VITALS
OXYGEN SATURATION: 98 % | WEIGHT: 146.3 LBS | HEART RATE: 92 BPM | BODY MASS INDEX: 28.57 KG/M2 | DIASTOLIC BLOOD PRESSURE: 46 MMHG | TEMPERATURE: 98.5 F | SYSTOLIC BLOOD PRESSURE: 83 MMHG

## 2021-07-06 NOTE — TELEPHONE ENCOUNTER
Telephone Encounter by Ada Espinoza MD at 7/6/2021  9:10 AM     Author: Ada Espinoza MD Service: -- Author Type: Physician    Filed: 7/6/2021  9:10 AM Encounter Date: 7/5/2021 Status: Signed    : Ada Espinoza MD (Physician)       Can we try a prior authorization?

## 2021-07-06 NOTE — TELEPHONE ENCOUNTER
Telephone Encounter by Jodi Cummins at 7/6/2021 10:12 AM     Author: Jodi Cummins Service: -- Author Type: --    Filed: 7/6/2021 10:19 AM Encounter Date: 7/5/2021 Status: Signed    : Jodi Cummins       [place] PA team  [phone]  Pool:  [place] MED ([id number])          PA has been initiated.       PA form completed and faxed insurance via Cover My Meds     Key:  [id number]     Medication:  tiZANidine HCl 2MG capsules      Insurance:  [employer]        Response will be received via fax and may take up to 5-10 business days depending on plan

## 2021-07-06 NOTE — TELEPHONE ENCOUNTER
Telephone Encounter by Amy Jamison at 7/6/2021  8:18 AM     Author: Amy Jamison Service: -- Author Type: --    Filed: 7/6/2021  8:18 AM Encounter Date: 7/5/2021 Status: Signed    : Amy Jamison       Please advise

## 2021-07-06 NOTE — TELEPHONE ENCOUNTER
Telephone Encounter by Jodi Cummins at 7/6/2021 10:22 AM     Author: Jodi Cummins Service: -- Author Type: --    Filed: 7/6/2021 10:22 AM Encounter Date: 7/5/2021 Status: Signed    : Jodi Cummins       No PA needed, medication is covered.  Called [facility] and had them re-process it.  Pharmacist now has a paid claim.  They will let patient know when its ready for .

## 2021-07-06 NOTE — TELEPHONE ENCOUNTER
Telephone Encounter by Byron Fletcher MA at 7/6/2021 10:11 AM     Author: Byron Fletcher MA Service: -- Author Type: Medical Assistant    Filed: 7/6/2021 10:11 AM Encounter Date: 7/5/2021 Status: Signed    : Byron Fletcher MA (Medical Assistant)       Please start PA on zanaflex

## 2021-07-07 ENCOUNTER — COMMUNICATION - HEALTHEAST (OUTPATIENT)
Dept: FAMILY MEDICINE | Facility: CLINIC | Age: 61
End: 2021-07-07

## 2021-07-07 NOTE — TELEPHONE ENCOUNTER
RN cannot approve Refill Request    RN can NOT refill this medication Patient requesting 90 day supply.  Outside protocol window to alter this script. Last office visit: 6/28/2021 Ada Espinoza MD Last Physical: Visit date not found Last MTM visit: Visit date not found Last visit same specialty: 6/28/2021 Ada Espinoza MD.  Next visit within 3 mo: Visit date not found  Next physical within 3 mo: Visit date not found      Justice Brownlee, [facility] Triage/Med Refill 6/28/2021    Requested Prescriptions   Pending Prescriptions Disp Refills     triamterene-hydrochlorothiazide (DYAZIDE) 37.5-25 mg per capsule [Pharmacy Med Name: TRIAMTERENE 37.5MG/ HCTZ 25MG CAPS] 90 capsule 0     Sig: TAKE 1 CAPSULE BY MOUTH DAILY AS NEEDED       Diuretics/Combination Diuretics Refill Protocol  Passed - 6/28/2021 12:23 PM        Passed - Visit with PCP or prescribing provider visit in past 12 months     Last office visit with prescriber/PCP: 6/28/2021 Ada Espinoza MD OR same dept: 6/28/2021 Ada Espinoza MD OR same specialty: 6/28/2021 Ada sEpinoza MD  Last physical: Visit date not found Last MTM visit: Visit date not found   Next visit within 3 mo: Visit date not found  Next physical within 3 mo: Visit date not found  Prescriber OR PCP: Ada Espinoza MD  Last diagnosis associated with med order: 1. Lower extremity edema  - triamterene-hydrochlorothiazide (DYAZIDE) 37.5-25 mg per capsule [Pharmacy Med Name: TRIAMTERENE 37.5MG/ HCTZ 25MG CAPS]; Take 1 capsule by mouth daily as needed.  Dispense: 90 capsule; Refill: 0    If protocol passes may refill for 12 months if within 3 months of last provider visit (or a total of 15 months).             Passed - Serum Potassium in past 12 months      Lab Results   Component Value Date    Potassium 4.0 02/24/2021             Passed - Serum Sodium in past 12 months      Lab Results   Component Value Date     Sodium 142 02/24/2021             Passed - Blood pressure on file in past 12 months     BP Readings from Last 1 Encounters:   06/28/21 (!) 83/46             Passed - Serum Creatinine in past 12 months      Creatinine   Date Value Ref Range Status   02/24/2021 0.94 0.60 - 1.10 mg/dL Final

## 2021-07-22 NOTE — LETTER
Letter by Ada Espinoza MD at      Author: Ada Espinoza MD Service: -- Author Type: --    Filed:  Encounter Date: 7/7/2021 Status: (Other)         Kerry Reed  [address] [place] [address]             July 7, 2021         Dear Ms. Reed,    Below are the results from your recent visit:    Resulted Orders   Hepatitis C Antibody (Anti-HCV)   Result Value Ref Range    Hepatitis C Ab Negative Negative   HIV Antigen/Antibody Screening [place]   Result Value Ref Range    HIV Antigen / Antibody Negative Negative    Narrative    Method is Abbott HIV Ag/Ab for the detection of HIV p24 antigen, HIV-1 antibodies and HIV-2 antibodies.   Lipid Profile   Result Value Ref Range    Triglycerides 80 <=149 mg/dL    Cholesterol 151 <=199 mg/dL    LDL Calculated 89 <=129 mg/dL    HDL Cholesterol 46 (L) >=50 mg/dL    Patient Fasting > 8hrs? Yes    Carbamazepine [Tegretol ]   Result Value Ref Range    Carbamazepine 7.4 4.0 - 12.0 ug/mL   Levetiracetam [Keppra ]   Result Value Ref Range    Levetiracetam 36.8 6.0 - 46.0 ug/mL    Narrative    Suggested Trough Concentrations: 6.0 - 46.0 ug/mL   Thyroid Stimulating Hormone (TSH)   Result Value Ref Range    TSH 2.76 0.30 - 5.00 uIU/mL   T4, Free   Result Value Ref Range    Free T4 0.8 0.7 - 1.8 ng/dL       Your labs look good.     Please call with questions or contact us using [employer].    Sincerely,        Electronically signed by Ada Espinoza MD

## 2021-08-03 DIAGNOSIS — M62.838 MUSCLE SPASM: ICD-10-CM

## 2021-08-03 DIAGNOSIS — F41.1 GENERALIZED ANXIETY DISORDER: ICD-10-CM

## 2021-08-03 NOTE — TELEPHONE ENCOUNTER
Reason for Call:  Medication or medication refill:    Do you use a [facility]?  Name of the pharmacy and phone number for the current request:  [facility] on file    Name of the medication requested: TiZANidine (ZANAFLEX) 2 MG capsule    Other request: Patient called to request a refill on this medication. She never picked up the medication in June as she was told it needed a PA. Please send refill to pharmacy.    Can we leave a detailed message on this number? YES    Phone number patient can be reached at: Home number on file [phone] (home)    Best Time: any    Call taken on 8/3/2021 at 8:19 AM by Irwin Sanon

## 2021-08-04 RX ORDER — TIZANIDINE HYDROCHLORIDE 2 MG/1
2 CAPSULE, GELATIN COATED ORAL
Qty: 30 CAPSULE | Refills: 1 | Status: SHIPPED | OUTPATIENT
Start: 2021-08-04 | End: 2021-10-05

## 2021-08-06 RX ORDER — HYDROXYZINE PAMOATE 25 MG/1
CAPSULE ORAL
Qty: 270 CAPSULE | Refills: 2 | Status: SHIPPED | OUTPATIENT
Start: 2021-08-06 | End: 2022-02-01

## 2021-08-06 NOTE — TELEPHONE ENCOUNTER
"Last Written Prescription Date:  6/16/21  Last Fill Quantity: 60,  # refills: 0   Last office visit provider:  6/28/21     Requested Prescriptions   Pending Prescriptions Disp Refills     hydrOXYzine (VISTARIL) 25 MG capsule [Pharmacy Med Name: HYDROXYZINE PAMOATE 25MG CAPSULES] 60 capsule 0     Sig: TAKE 1 CAPSULE(25 MG) BY MOUTH THREE TIMES DAILY AS NEEDED FOR ITCHING OR ANXIETY       Antihistamines Protocol Passed - 8/3/2021  9:14 AM        Passed - Recent (12 mo) or future (30 days) visit within the authorizing provider's specialty     Patient has had an office visit with the authorizing provider or a provider within the authorizing providers department within the previous 12 mos or has a future within next 30 days. See \"Patient Info\" tab in inbasket, or \"Choose Columns\" in Meds & Orders section of the refill encounter.              Passed - Patient is age 3 or older     Apply age and/or weight-based dosing for peds patients age 3 and older.    Forward request to provider for patients under the age of 3.          Passed - Medication is active on med list             Justice Brownlee RN 08/06/21 11:55 AM  "

## 2021-09-28 DIAGNOSIS — R60.0 LOWER EXTREMITY EDEMA: ICD-10-CM

## 2021-09-29 RX ORDER — TRIAMTERENE AND HYDROCHLOROTHIAZIDE 37.5; 25 MG/1; MG/1
CAPSULE ORAL
Qty: 90 CAPSULE | Refills: 1 | Status: SHIPPED | OUTPATIENT
Start: 2021-09-29 | End: 2022-04-01

## 2021-09-29 NOTE — TELEPHONE ENCOUNTER
"Last Written Prescription Date:  6/28/21  Last Fill Quantity: 90,  # refills: 0   Last office visit provider:  6/28/21     Requested Prescriptions   Pending Prescriptions Disp Refills     triamterene-HCTZ (DYAZIDE) 37.5-25 MG capsule [Pharmacy Med Name: TRIAMTERENE 37.5MG/ HCTZ 25MG CAPS] 90 capsule 0     Sig: TAKE 1 CAPSULE BY MOUTH DAILY AS NEEDED       Diuretics (Including Combos) Protocol Passed - 9/28/2021  3:51 AM        Passed - Blood pressure under 140/90 in past 12 months     BP Readings from Last 3 Encounters:   06/28/21 (!) 83/46   02/24/21 120/87   12/16/20 126/84                 Passed - Recent (12 mo) or future (30 days) visit within the authorizing provider's specialty     Patient has had an office visit with the authorizing provider or a provider within the authorizing providers department within the previous 12 mos or has a future within next 30 days. See \"Patient Info\" tab in inbasket, or \"Choose Columns\" in Meds & Orders section of the refill encounter.              Passed - Medication is active on med list        Passed - Patient is age 18 or older        Passed - No active pregancy on record        Passed - Normal serum creatinine on file in past 12 months     Recent Labs   Lab Test 02/24/21  1233   CR 0.94              Passed - Normal serum potassium on file in past 12 months     Recent Labs   Lab Test 02/24/21  1233   POTASSIUM 4.0                    Passed - Normal serum sodium on file in past 12 months     Recent Labs   Lab Test 02/24/21  1233                 Passed - No positive pregnancy test in past 12 months             Cherelle Ricks RN 09/29/21 10:47 AM  "

## 2021-09-30 DIAGNOSIS — M62.838 MUSCLE SPASM: ICD-10-CM

## 2021-09-30 NOTE — TELEPHONE ENCOUNTER
Reason for Call:  Medication or medication refill:    Do you use a [facility]?  Name of the pharmacy and phone number for the current request:  [facility] [address]    Name of the medication requested: tizanidine 2 mg    Other request: no    Can we leave a detailed message on this number? YES    Phone number patient can be reached at: Home number on file [phone] (home)    Best Time: anytime    Call taken on 9/30/2021 at 10:56 AM by Elsa Mcgarry

## 2021-10-01 NOTE — TELEPHONE ENCOUNTER
Routing refill request to provider for review/approval because:  Drug not on the [facility] ref[place]     Last Written Prescription Date:  8/4/21  Last Fill Quantity: 30,  # refills: 1   Last office visit provider:  6/28/21     Requested Prescriptions   Pending Prescriptions Disp Refills     tiZANidine (ZANAFLEX) 2 MG capsule 30 capsule 1     Sig: Take 1 capsule (2 mg) by mouth nightly as needed (muscle spasm)       There is no refill protocol information for this order          Justice Brownlee RN 10/01/21 12:58 PM

## 2021-10-05 RX ORDER — TIZANIDINE HYDROCHLORIDE 2 MG/1
2 CAPSULE, GELATIN COATED ORAL
Qty: 30 CAPSULE | Refills: 1 | Status: SHIPPED | OUTPATIENT
Start: 2021-10-05 | End: 2022-02-01

## 2021-12-10 DIAGNOSIS — G40.A09 NONINTRACTABLE ABSENCE EPILEPSY WITHOUT STATUS EPILEPTICUS (H): ICD-10-CM

## 2021-12-10 DIAGNOSIS — R06.02 SHORTNESS OF BREATH: ICD-10-CM

## 2021-12-13 NOTE — TELEPHONE ENCOUNTER
"Routing refill request to provider for review/approval because:  Medication was prescribed for shortness of breath. PCP please review for refill.     Last Written Prescription Date:  1/18/2021  Last Fill Quantity: 8.5 g,  # refills: 1   Last office visit provider:  6/28/2021     Requested Prescriptions   Pending Prescriptions Disp Refills     albuterol (PROAIR HFA/PROVENTIL HFA/VENTOLIN HFA) 108 (90 Base) MCG/ACT inhaler [Pharmacy Med Name: ALBUTEROL HFA INH (200 PUFFS)8.5GM] 8.5 g 1     Sig: INHALE 2 PUFFS BY MOUTH EVERY 4 HOURS AS NEEDED FOR WHEEZING       Asthma Maintenance Inhalers - Anticholinergics Passed - 12/10/2021  6:58 PM        Passed - Patient is age 12 years or older        Passed - Recent (12 mo) or future (30 days) visit within the authorizing provider's specialty     Patient has had an office visit with the authorizing provider or a provider within the authorizing providers department within the previous 12 mos or has a future within next 30 days. See \"Patient Info\" tab in inbasket, or \"Choose Columns\" in Meds & Orders section of the refill encounter.              Passed - Medication is active on med list       Short-Acting Beta Agonist Inhalers Protocol  Passed - 12/10/2021  6:58 PM        Passed - Patient is age 12 or older        Passed - Recent (12 mo) or future (30 days) visit within the authorizing provider's specialty     Patient has had an office visit with the authorizing provider or a provider within the authorizing providers department within the previous 12 mos or has a future within next 30 days. See \"Patient Info\" tab in inbasket, or \"Choose Columns\" in Meds & Orders section of the refill encounter.              Passed - Medication is active on med list        Routing refill request to provider for review/approval because:  [facility] unable to fill per protocol. PCP please review for refill.     Last Written Prescription Date:  6/18/2021  Last Fill Quantity: 180,  # refills: 1        " "carBAMazepine (TEGRETOL) 200 MG tablet [Pharmacy Med Name: CARBAMAZEPINE 200MG TABLETS] 180 tablet 1     Sig: TAKE 1 TABLET(200 MG) BY MOUTH TWICE DAILY       Anti-Seizure Meds Protocol  Failed - 12/10/2021  6:58 PM        Failed - Review Authorizing provider's last note.      Refer to last progress notes: confirm request is for original authorizing provider (cannot be through other providers).          Failed - Carbamazepine level within therapeutic range in last 26 months     No lab results found.    Carbamazepine level must be checked 2-4 weeks after dosage change.            Passed - Recent (12 mo) or future (30 days) visit within the authorizing provider's specialty     Patient has had an office visit with the authorizing provider or a provider within the authorizing providers department within the previous 12 mos or has a future within next 30 days. See \"Patient Info\" tab in inbasket, or \"Choose Columns\" in Meds & Orders section of the refill encounter.              Passed - Normal CBC on file in past 26 months     Recent Labs   Lab Test 02/24/21  1233   WBC 6.1   RBC 4.25   HGB 13.6   HCT 42.7                    Passed - Normal ALT or AST on file in past 26 months     Recent Labs   Lab Test 02/24/21  1233   ALT 19     Recent Labs   Lab Test 02/24/21  1233   AST 23             Passed - Normal platelet count on file in past 26 months     Recent Labs   Lab Test 02/24/21  1233                  Passed - Medication is active on med list        Passed - No active pregnancy on record        Passed - No positive pregnancy test in last 12 months             Tabatha Gilbert RN 12/12/21 11:17 PM  "

## 2021-12-14 RX ORDER — ALBUTEROL SULFATE 90 UG/1
AEROSOL, METERED RESPIRATORY (INHALATION)
Qty: 8.5 G | Refills: 1 | Status: SHIPPED | OUTPATIENT
Start: 2021-12-14 | End: 2022-03-17

## 2021-12-14 RX ORDER — CARBAMAZEPINE 200 MG/1
TABLET ORAL
Qty: 180 TABLET | Refills: 1 | Status: SHIPPED | OUTPATIENT
Start: 2021-12-14 | End: 2022-03-17

## 2022-01-17 DIAGNOSIS — R06.02 SHORTNESS OF BREATH: ICD-10-CM

## 2022-01-19 RX ORDER — ALBUTEROL SULFATE 90 UG/1
AEROSOL, METERED RESPIRATORY (INHALATION)
Qty: 8.5 G | Refills: 1 | OUTPATIENT
Start: 2022-01-19

## 2022-02-01 ENCOUNTER — VIRTUAL VISIT (OUTPATIENT)
Dept: FAMILY MEDICINE | Facility: CLINIC | Age: 62
End: 2022-02-01
Payer: MEDICARE

## 2022-02-01 DIAGNOSIS — Z09 HOSPITAL DISCHARGE FOLLOW-UP: Primary | ICD-10-CM

## 2022-02-01 DIAGNOSIS — U07.1 INFECTION DUE TO 2019 NOVEL CORONAVIRUS: ICD-10-CM

## 2022-02-01 DIAGNOSIS — F41.1 GENERALIZED ANXIETY DISORDER: ICD-10-CM

## 2022-02-01 DIAGNOSIS — E03.8 OTHER SPECIFIED HYPOTHYROIDISM: ICD-10-CM

## 2022-02-01 DIAGNOSIS — M62.838 MUSCLE SPASM: ICD-10-CM

## 2022-02-01 PROCEDURE — 99441 PR PHYSICIAN TELEPHONE EVALUATION 5-10 MIN: CPT | Mod: 95 | Performed by: FAMILY MEDICINE

## 2022-02-01 RX ORDER — LOPERAMIDE HYDROCHLORIDE 2 MG/1
2 TABLET ORAL 4 TIMES DAILY PRN
Qty: 30 TABLET | Refills: 0 | Status: SHIPPED | OUTPATIENT
Start: 2022-02-01 | End: 2024-01-22

## 2022-02-01 RX ORDER — HYDROXYZINE PAMOATE 25 MG/1
CAPSULE ORAL
Qty: 270 CAPSULE | Refills: 0 | Status: SHIPPED | OUTPATIENT
Start: 2022-02-01 | End: 2022-03-17

## 2022-02-01 RX ORDER — TIZANIDINE HYDROCHLORIDE 2 MG/1
2 CAPSULE, GELATIN COATED ORAL
Qty: 15 CAPSULE | Refills: 0 | Status: SHIPPED | OUTPATIENT
Start: 2022-02-01 | End: 2022-03-17 | Stop reason: DRUGHIGH

## 2022-02-01 RX ORDER — LEVOTHYROXINE SODIUM 50 MCG
TABLET ORAL
Qty: 90 TABLET | Refills: 0 | Status: SHIPPED | OUTPATIENT
Start: 2022-02-01 | End: 2022-04-04

## 2022-02-01 NOTE — PROGRESS NOTES
Called [facility] and they need a release of info sent to them - fax number is [phone].  Also [facility] on [address] DOES NOT have a Synthroid script for pt. Thanks

## 2022-02-01 NOTE — PROGRESS NOTES
[place]ia is a 61 year old who is being evaluated via a billable telephone visit.      What phone number would you like to be contacted at? [phone]  How would you like to obtain your AVS? Mail a copy    Assessment & Plan     Hospital discharge follow-up  - loperamide (IMODIUM A-D) 2 MG tablet; Take 1 tablet (2 mg) by mouth 4 times daily as needed for diarrhea  Infection due to 2019 novel coronavirus  Generalized anxiety disorder  - hydrOXYzine (VISTARIL) 25 MG capsule; TAKE 1 CAPSULE(25 MG) BY MOUTH THREE TIMES DAILY AS NEEDED FOR ITCHING OR ANXIETY  Muscle spasm  - tiZANidine (ZANAFLEX) 2 MG capsule; Take 1 capsule (2 mg) by mouth nightly as needed (muscle spasm)  Other specified hypothyroidism  - SYNTHROID 50 MCG tablet; [SYNTHROID 50 MCG TABLET] TAKE 1 TABLET(50 MCG) BY MOUTH DAILY  EHR was reviewed.   Past medical history, problem list, past surgical history, family history, social history, medications reviewed, updated, reconciled.   Unfortunately no records can be found for her hospital stay. Records were requested.   She feels recovered from covid 19.   Is still concerned about loose stool. Loperamide was refilled but we discussed appropriate use.   She is tolerating diet. No signs of dehydration.   Other refills were reviewed and provided. Last thyroid function in June was normal.   Since no recent documentation or work up is available, advised to be seen soon for a physical and review of chronic health concerns.     Return for Routine preventive.    Josephine Viramontes MD  [facility]   [place] is a 61 year old female seen for follow up.   She reports recent hospitalization, last Wednesday or Thursday to Saturday. She reports she went to [facility] because she had diarrhea. She reports diagnosis with covid 19 which she feels is much better. She can not recall what tests were done. She says she was discharged to home with her sister. She is tolerating diet. No pain except  her usual diffuse muscle aches. She needs her tizanadine refilled. She also notes trying to refill her synthroid and there were no refills present. She denies cough or trouble breathing. She is only concerned with ongoing loose stool. It is usually once per day but was taking loperamide, wondering if she can have a refill of this. Her anxiety is ok and needs a refill for hydroxyzine also.           Objective           Vitals:  No vitals were obtained today due to virtual visit.    Physical Exam   healthy, alert and no distress  PSYCH: Alert and oriented times 3; coherent speech, normal   rate and volume, able to articulate logical thoughts, able   to abstract reason, no tangential thoughts, no hallucinations   or delusions  Her affect is normal  RESP: No cough, no audible wheezing, able to talk in full sentences  Remainder of exam unable to be completed due to telephone visits            Phone call duration: 10 minutes

## 2022-02-01 NOTE — Clinical Note
Please see why [facility], need [facility] or something else to get them. She was in last Saturday to Wednesday she said .  Also she says no refill for synthroid but refills sent already by PCP, can we see if pharmacy has this for patient.

## 2022-03-14 DIAGNOSIS — M62.838 MUSCLE SPASM: ICD-10-CM

## 2022-03-14 DIAGNOSIS — G40.A09 NONINTRACTABLE ABSENCE EPILEPSY WITHOUT STATUS EPILEPTICUS (H): ICD-10-CM

## 2022-03-14 DIAGNOSIS — Z76.0 ENCOUNTER FOR MEDICATION REFILL: Primary | ICD-10-CM

## 2022-03-14 DIAGNOSIS — J31.0 CHRONIC RHINITIS: ICD-10-CM

## 2022-03-14 DIAGNOSIS — R06.02 SHORTNESS OF BREATH: ICD-10-CM

## 2022-03-14 NOTE — TELEPHONE ENCOUNTER
Pt had to reschedule appt that was for today until 3/28 but pt stated she only had 10 days left of medications. Pt was wondering if she could get refill on medications. Ok to leave detailed message on phone

## 2022-03-17 RX ORDER — LEVETIRACETAM 1000 MG/1
1000 TABLET ORAL 2 TIMES DAILY
Qty: 180 TABLET | Refills: 3 | Status: SHIPPED | OUTPATIENT
Start: 2022-03-17 | End: 2022-10-11 | Stop reason: ALTCHOICE

## 2022-03-17 RX ORDER — TIZANIDINE HYDROCHLORIDE 2 MG/1
2 CAPSULE, GELATIN COATED ORAL
Qty: 30 CAPSULE | Refills: 0 | Status: CANCELLED | OUTPATIENT
Start: 2022-03-17

## 2022-03-17 RX ORDER — ALBUTEROL SULFATE 90 UG/1
AEROSOL, METERED RESPIRATORY (INHALATION)
Qty: 8.5 G | Refills: 3 | Status: SHIPPED | OUTPATIENT
Start: 2022-03-17 | End: 2022-06-03

## 2022-03-17 RX ORDER — CARBAMAZEPINE 200 MG/1
TABLET ORAL
Qty: 180 TABLET | Refills: 3 | Status: SHIPPED | OUTPATIENT
Start: 2022-03-17 | End: 2023-04-05

## 2022-03-17 NOTE — TELEPHONE ENCOUNTER
I contacted the patient and reviewed all of the medications for which she needs refills. The medications are queued and pended for the doctor to sign and send.     MD please note that patient indicates that the 2 mg Tizanidine is not working well for her - she would like to increase her dose of the muscle relaxer. I notified the patient that you may require in-office follow up before altering the med dose and she seems to understand this, but would like the dose increased if possible prior to her follow up on 3/28/2022.     I verified patient pharmacy. Thank you!

## 2022-03-17 NOTE — TELEPHONE ENCOUNTER
I keep trying to refill meds, but not sure we have current list of what she (actually) takes.  If we can verify this, I will sign these.

## 2022-03-28 ENCOUNTER — OFFICE VISIT (OUTPATIENT)
Dept: FAMILY MEDICINE | Facility: CLINIC | Age: 62
End: 2022-03-28
Payer: MEDICARE

## 2022-03-28 VITALS
BODY MASS INDEX: 29.35 KG/M2 | OXYGEN SATURATION: 98 % | SYSTOLIC BLOOD PRESSURE: 125 MMHG | HEIGHT: 60 IN | TEMPERATURE: 98.4 F | HEART RATE: 87 BPM | DIASTOLIC BLOOD PRESSURE: 73 MMHG | WEIGHT: 149.5 LBS

## 2022-03-28 DIAGNOSIS — E03.8 OTHER SPECIFIED HYPOTHYROIDISM: ICD-10-CM

## 2022-03-28 DIAGNOSIS — Z23 NEED FOR TETANUS BOOSTER: ICD-10-CM

## 2022-03-28 DIAGNOSIS — Z00.00 ENCOUNTER FOR MEDICARE ANNUAL WELLNESS EXAM: Primary | ICD-10-CM

## 2022-03-28 DIAGNOSIS — E03.9 HYPOTHYROIDISM, UNSPECIFIED TYPE: ICD-10-CM

## 2022-03-28 DIAGNOSIS — J31.0 CHRONIC RHINITIS: ICD-10-CM

## 2022-03-28 DIAGNOSIS — K59.00 CONSTIPATION, UNSPECIFIED CONSTIPATION TYPE: ICD-10-CM

## 2022-03-28 DIAGNOSIS — F41.1 GENERALIZED ANXIETY DISORDER: ICD-10-CM

## 2022-03-28 DIAGNOSIS — Z12.31 VISIT FOR SCREENING MAMMOGRAM: ICD-10-CM

## 2022-03-28 DIAGNOSIS — Z00.00 ADULT GENERAL MEDICAL EXAM: ICD-10-CM

## 2022-03-28 DIAGNOSIS — Z23 NEED FOR COVID-19 VACCINE: ICD-10-CM

## 2022-03-28 DIAGNOSIS — Z12.11 SCREEN FOR COLON CANCER: ICD-10-CM

## 2022-03-28 DIAGNOSIS — Z12.4 CERVICAL CANCER SCREENING: ICD-10-CM

## 2022-03-28 DIAGNOSIS — G40.A09 NONINTRACTABLE ABSENCE EPILEPSY WITHOUT STATUS EPILEPTICUS (H): ICD-10-CM

## 2022-03-28 LAB
ALBUMIN SERPL-MCNC: 4 G/DL (ref 3.5–5)
ALP SERPL-CCNC: 136 U/L (ref 45–120)
ALT SERPL W P-5'-P-CCNC: 18 U/L (ref 0–45)
ANION GAP SERPL CALCULATED.3IONS-SCNC: 10 MMOL/L (ref 5–18)
AST SERPL W P-5'-P-CCNC: 23 U/L (ref 0–40)
BILIRUB SERPL-MCNC: 0.3 MG/DL (ref 0–1)
BUN SERPL-MCNC: 17 MG/DL (ref 8–22)
CALCIUM SERPL-MCNC: 9.3 MG/DL (ref 8.5–10.5)
CARBAMAZEPINE SERPL-MCNC: 5.4 UG/ML
CHLORIDE BLD-SCNC: 98 MMOL/L (ref 98–107)
CO2 SERPL-SCNC: 31 MMOL/L (ref 22–31)
CREAT SERPL-MCNC: 1.08 MG/DL (ref 0.6–1.1)
ERYTHROCYTE [DISTWIDTH] IN BLOOD BY AUTOMATED COUNT: 11.8 % (ref 10–15)
GFR SERPL CREATININE-BSD FRML MDRD: 58 ML/MIN/1.73M2
GLUCOSE BLD-MCNC: 108 MG/DL (ref 70–125)
HCT VFR BLD AUTO: 43.4 % (ref 35–47)
HGB BLD-MCNC: 14.3 G/DL (ref 11.7–15.7)
MCH RBC QN AUTO: 32.1 PG (ref 26.5–33)
MCHC RBC AUTO-ENTMCNC: 32.9 G/DL (ref 31.5–36.5)
MCV RBC AUTO: 98 FL (ref 78–100)
PLATELET # BLD AUTO: 158 10E3/UL (ref 150–450)
POTASSIUM BLD-SCNC: 3.6 MMOL/L (ref 3.5–5)
PROT SERPL-MCNC: 7.6 G/DL (ref 6–8)
RBC # BLD AUTO: 4.45 10E6/UL (ref 3.8–5.2)
SODIUM SERPL-SCNC: 139 MMOL/L (ref 136–145)
TSH SERPL DL<=0.005 MIU/L-ACNC: 1.6 UIU/ML (ref 0.3–5)
WBC # BLD AUTO: 8.8 10E3/UL (ref 4–11)

## 2022-03-28 PROCEDURE — 80177 DRUG SCRN QUAN LEVETIRACETAM: CPT | Mod: 90 | Performed by: FAMILY MEDICINE

## 2022-03-28 PROCEDURE — 0051A COVID-19,PF,PFIZER (12+ YRS): CPT | Performed by: FAMILY MEDICINE

## 2022-03-28 PROCEDURE — 80156 ASSAY CARBAMAZEPINE TOTAL: CPT | Performed by: FAMILY MEDICINE

## 2022-03-28 PROCEDURE — 80053 COMPREHEN METABOLIC PANEL: CPT | Performed by: FAMILY MEDICINE

## 2022-03-28 PROCEDURE — 84443 ASSAY THYROID STIM HORMONE: CPT | Performed by: FAMILY MEDICINE

## 2022-03-28 PROCEDURE — 36415 COLL VENOUS BLD VENIPUNCTURE: CPT | Performed by: FAMILY MEDICINE

## 2022-03-28 PROCEDURE — G0438 PPPS, INITIAL VISIT: HCPCS | Performed by: FAMILY MEDICINE

## 2022-03-28 PROCEDURE — 91305 COVID-19,PF,PFIZER (12+ YRS): CPT | Performed by: FAMILY MEDICINE

## 2022-03-28 PROCEDURE — 85027 COMPLETE CBC AUTOMATED: CPT | Performed by: FAMILY MEDICINE

## 2022-03-28 PROCEDURE — 90715 TDAP VACCINE 7 YRS/> IM: CPT | Performed by: FAMILY MEDICINE

## 2022-03-28 PROCEDURE — 90471 IMMUNIZATION ADMIN: CPT | Performed by: FAMILY MEDICINE

## 2022-03-28 PROCEDURE — 99000 SPECIMEN HANDLING OFFICE-LAB: CPT | Performed by: FAMILY MEDICINE

## 2022-03-28 RX ORDER — TIZANIDINE HYDROCHLORIDE 2 MG/1
CAPSULE, GELATIN COATED ORAL
COMMUNITY
Start: 2022-03-17 | End: 2022-03-28

## 2022-03-28 RX ORDER — LORATADINE 10 MG/1
10 TABLET ORAL DAILY PRN
Qty: 90 TABLET | Refills: 3 | Status: SHIPPED | OUTPATIENT
Start: 2022-03-28

## 2022-03-28 ASSESSMENT — ACTIVITIES OF DAILY LIVING (ADL)
CURRENT_FUNCTION: MONEY MANAGEMENT REQUIRES ASSISTANCE
CURRENT_FUNCTION: TELEPHONE REQUIRES ASSISTANCE
CURRENT_FUNCTION: PREPARING MEALS REQUIRES ASSISTANCE
CURRENT_FUNCTION: SHOPPING REQUIRES ASSISTANCE
CURRENT_FUNCTION: TRANSPORTATION REQUIRES ASSISTANCE
CURRENT_FUNCTION: MEDICATION ADMINISTRATION REQUIRES ASSISTANCE

## 2022-03-28 NOTE — PROGRESS NOTES
"SUBJECTIVE:   Kerry Reed is a 61 year old female who presents for Preventive Visit.        Are you in the first 12 months of your Medicare coverage?  Yes,  Visual Acuity: Both Eyes: 10/20    Had COVID in January -   Got super sick - ended up in hospital   Had a lot of emesis/diarrhea - couldn't hold down the seizure medications  Sister cared for her at home - until she was so sick   Sister takes vitamins and had both the vaccines -   Requests no shot in right shoulder     Colonoscopy - boyfriend had one years ago and was up all night pooping - \"no\"      Does get diarrhea off/on - drinks pop 2-3x/day  Does better with fiber    Wants legal marijuana - because of seizures, anxiety, poor sleep   Did find CBD to be effective for her     Chronic right shoulder pain - fell into corner of the stove one night -   Has arthritis in shoulder - has decreased ROM but working on exercise - sister is making her work it   Does okay with exercise    Mammogram - too many things going on today -     Cervical cancer years and years ago - no recent pap smear  Had hysterectomy -     Still hasn't gotten stent out from gall bladder -   \"I didn't want to fall\"    Seizures - petit mal - not very often when taking her medications -   increased with COVID because she couldn't take her meds    Smoke - uses e-cig  Smokes pot - not quitting pot -     Daughter took money out of patient's account  Daughter is bipolar - 40 - just one child     Takes valerian at night - to rest better            Healthy Habits:     In general, how would you rate your overall health?  Fair    Frequency of exercise:  1 day/week    Duration of exercise:  15-30 minutes    Do you usually eat at least 4 servings of fruit and vegetables a day, include whole grains    & fiber and avoid regularly eating high fat or \"junk\" foods?  No    Taking medications regularly:  Yes    Barriers to taking medications:  None    Medication side effects:  None    Ability to " successfully perform activities of daily living:  Telephone requires assistance, transportation requires assistance, shopping requires assistance, preparing meals requires assistance, medication administration requires assistance and money management requires assistance    Home Safety:  Lack of handrails on stairs    Hearing Impairment:  Difficulty following a conversation in a noisy restaurant or crowded room, feel that people are mumbling or not speaking clearly, difficulty following dialogue in the theater, difficult to understand a speaker at a public meeting or Hinduism service, need to ask people to speak up or repeat themselves, find that men's voices are easier to understand than woman's, difficulty understanding soft or whispered speech and difficulty understanding speech on the telephone    In the past 6 months, have you been bothered by leaking of urine?  No    In general, how would you rate your overall mental or emotional health?  Good      PHQ-2 Total Score: 0    Additional concerns today:  Yes    Do you feel safe in your environment? Yes    Have you ever done Advance Care Planning? (For example, a Health Directive, POLST, or a discussion with a medical provider or your loved ones about your wishes): No, advance care planning information given to patient to review.  Patient plans to discuss their wishes with loved ones or provider.         Fall risk  Fallen 2 or more times in the past year?: Yes  Any fall with injury in the past year?: Yes  Timed Up and Go Test (>13.5 is fall risk; contact physician) : 14    Cognitive Screening   1) Repeat 3 items (Leader, Season, Table)    2) Clock draw: ABNORMAL patient unable to understand clocks  3) 3 item recall: Recalls 3 objects  Results: 3 items recalled: COGNITIVE IMPAIRMENT LESS LIKELY    Mini-CogTM Copyright HANS Mitchell. Licensed by the author for use in [facility]; reprinted with permission ([email or web address]). All rights reserved.          Reviewed  and updated as needed this visit by clinical staff   Tobacco  Allergies               Reviewed and updated as needed this visit by Provider                 Social History     Tobacco Use     Smoking status: Former Smoker     Smokeless tobacco: Never Used     Tobacco comment: vape   Substance Use Topics     Alcohol use: Not on file     If you drink alcohol do you typically have >3 drinks per day or >7 drinks per week? Not applicable    Alcohol Use 3/28/2022   Prescreen: >3 drinks/day or >7 drinks/week? Not Applicable   Prescreen: >3 drinks/day or >7 drinks/week? -   No flowsheet data found.    Current providers sharing in care for this patient include:   Patient Care Team:  Ada Espinoza MD as PCP - General  Ada Espinoza MD as Assigned PCP    The following health maintenance items are reviewed in Epic and correct as of today:  Health Maintenance Due   Topic Date Due     MAMMO SCREENING  Never done     COLORECTAL CANCER SCREENING  Never done     PAP  Never done     ZOSTER IMMUNIZATION (1 of 2) Never done     LUNG CANCER SCREENING  Never done     INFLUENZA VACCINE (1) 09/01/2021     BP Readings from Last 3 Encounters:   03/28/22 125/73   06/28/21 (!) 83/46   02/24/21 120/87    Wt Readings from Last 3 Encounters:   03/28/22 67.8 kg (149 lb 8 oz)   06/28/21 66.4 kg (146 lb 4.8 oz)   02/24/21 66.2 kg (146 lb)                  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 No  "pertinent surgical history.    Social History     Tobacco Use     Smoking status: Former Smoker     Smokeless tobacco: Never Used     Tobacco comment: vape   Substance Use Topics     Alcohol use: Not on file     History reviewed. No pertinent family history.      Current Outpatient Medications   Medication Sig Dispense Refill     albuterol (PROAIR HFA/PROVENTIL HFA/VENTOLIN HFA) 108 (90 Base) MCG/ACT inhaler INHALE 2 PUFFS BY MOUTH EVERY 4 HOURS AS NEEDED FOR WHEEZING 8.5 g 3     carBAMazepine (TEGRETOL) 200 MG tablet TAKE 1 TABLET(200 MG) BY MOUTH TWICE DAILY 180 tablet 3     levETIRAcetam (KEPPRA) 1000 MG tablet Take 1 tablet (1,000 mg) by mouth 2 times daily 180 tablet 3     loperamide (IMODIUM A-D) 2 MG tablet Take 1 tablet (2 mg) by mouth 4 times daily as needed for diarrhea 30 tablet 0     loratadine (CLARITIN) 10 MG tablet Take 1 tablet (10 mg) by mouth daily as needed for allergies 90 tablet 3     psyllium (METAMUCIL SMOOTH TEXTURE) 28 % packet 1 packet in 8 oz water po daily 90 each 3     SYNTHROID 50 MCG tablet [SYNTHROID 50 MCG TABLET] TAKE 1 TABLET(50 MCG) BY MOUTH DAILY 90 tablet 0     tiZANidine (ZANAFLEX) 4 MG tablet Take 1 tablet (4 mg) by mouth nightly as needed for muscle spasms 30 tablet 0     triamterene-HCTZ (DYAZIDE) 37.5-25 MG capsule TAKE 1 CAPSULE BY MOUTH DAILY AS NEEDED 90 capsule 1     Allergies   Allergen Reactions     Penicillins Hives     Azithromycin Rash     Citalopram Other (See Comments)     \"made me feel weird and spacey\"     Erythromycin Rash     Venlafaxine Other (See Comments)     \"zombie\"     Reviewed HM   Any new diagnosis of family breast, ovarian, or bowel cancer? No    FHS-7: No flowsheet data found.    Mammogram Screening: Recommended mammography every 1-2 years with patient discussion and risk factor consideration  Pertinent mammograms are reviewed under the imaging tab.    Review of Systems   Constitutional: Positive for activity change.   HENT: Negative for sore " "throat.    Eyes: Positive for visual disturbance.   Respiratory: Negative for cough and shortness of breath.    Cardiovascular: Negative for chest pain and peripheral edema.   Gastrointestinal: Positive for diarrhea (recent, resolved) and vomiting (recent, resolved). Negative for abdominal pain.        Still has stent in bile duct?   Endocrine: Negative.  Negative for polydipsia and polyuria.   Breasts:  negative.    Genitourinary: Negative.    Musculoskeletal: Positive for arthralgias.   Skin: Negative.    Allergic/Immunologic: Negative.    Neurological: Positive for seizures.   Psychiatric/Behavioral: The patient is nervous/anxious.    All other systems reviewed and are negative.        OBJECTIVE:   /73   Pulse 87   Temp 98.4  F (36.9  C)   Ht 1.535 m (5' 0.43\")   Wt 67.8 kg (149 lb 8 oz)   LMP  (LMP Unknown)   SpO2 98%   BMI 28.78 kg/m   Estimated body mass index is 28.78 kg/m  as calculated from the following:    Height as of this encounter: 1.535 m (5' 0.43\").    Weight as of this encounter: 67.8 kg (149 lb 8 oz).  Physical Exam  Constitutional:       General: She is not in acute distress.     Appearance: She is well-developed.   HENT:      Right Ear: Tympanic membrane and external ear normal.      Left Ear: Tympanic membrane and external ear normal.      Nose: Nose normal.      Mouth/Throat:      Pharynx: No oropharyngeal exudate.   Eyes:      General:         Right eye: No discharge.         Left eye: No discharge.      Conjunctiva/sclera: Conjunctivae normal.      Pupils: Pupils are equal, round, and reactive to light.   Neck:      Thyroid: No thyromegaly.      Trachea: No tracheal deviation.   Cardiovascular:      Rate and Rhythm: Normal rate and regular rhythm.      Pulses: Normal pulses.      Heart sounds: Normal heart sounds, S1 normal and S2 normal. No murmur heard.    No friction rub. No S3 or S4 sounds.   Pulmonary:      Effort: Pulmonary effort is normal. No respiratory distress.      " Breath sounds: Normal breath sounds. No wheezing or rales.   Chest:   Breasts:      Right: No mass, nipple discharge or tenderness.      Left: No mass, nipple discharge or tenderness.       Abdominal:      General: Bowel sounds are normal.      Palpations: Abdomen is soft. There is no mass.      Tenderness: There is no abdominal tenderness.   Musculoskeletal:         General: Normal range of motion.      Cervical back: Neck supple.   Lymphadenopathy:      Cervical: No cervical adenopathy.   Skin:     General: Skin is warm and dry.      Findings: No rash.   Neurological:      General: No focal deficit present.      Mental Status: She is alert and oriented to person, place, and time.      Motor: No abnormal muscle tone.      Deep Tendon Reflexes: Reflexes are normal and symmetric.   Psychiatric:         Mood and Affect: Mood normal.           Diagnostic Test Results:  Labs reviewed in Epic  Results for orders placed or performed in visit on 03/28/22   Comprehensive metabolic panel (BMP + Alb, Alk Phos, ALT, AST, Total. Bili, TP)     Status: Abnormal   Result Value Ref Range    Sodium 139 136 - 145 mmol/L    Potassium 3.6 3.5 - 5.0 mmol/L    Chloride 98 98 - 107 mmol/L    Carbon Dioxide (CO2) 31 22 - 31 mmol/L    Anion Gap 10 5 - 18 mmol/L    Urea Nitrogen 17 8 - 22 mg/dL    Creatinine 1.08 0.60 - 1.10 mg/dL    Calcium 9.3 8.5 - 10.5 mg/dL    Glucose 108 70 - 125 mg/dL    Alkaline Phosphatase 136 (H) 45 - 120 U/L    AST 23 0 - 40 U/L    ALT 18 0 - 45 U/L    Protein Total 7.6 6.0 - 8.0 g/dL    Albumin 4.0 3.5 - 5.0 g/dL    Bilirubin Total 0.3 0.0 - 1.0 mg/dL    GFR Estimate 58 (L) >60 mL/min/1.73m2   CBC with platelets     Status: Normal   Result Value Ref Range    WBC Count 8.8 4.0 - 11.0 10e3/uL    RBC Count 4.45 3.80 - 5.20 10e6/uL    Hemoglobin 14.3 11.7 - 15.7 g/dL    Hematocrit 43.4 35.0 - 47.0 %    MCV 98 78 - 100 fL    MCH 32.1 26.5 - 33.0 pg    MCHC 32.9 31.5 - 36.5 g/dL    RDW 11.8 10.0 - 15.0 %    Platelet  Count 158 150 - 450 10e3/uL   Carbamazepine total     Status: Normal   Result Value Ref Range    Carbamazepine 5.4   ug/mL   Keppra (Levetiracetam) Level     Status: None   Result Value Ref Range    Keppra (Levetiracetam) Level 40 10 - 40 ug/mL   TSH     Status: Normal   Result Value Ref Range    TSH 1.60 0.30 - 5.00 uIU/mL       ASSESSMENT / PLAN:   1. Encounter for Medicare annual wellness exam  2. Adult general medical exam  This is a 62 yo female here for AWV/physical exam - follow up of medical conditions - reviewed     3. 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    - Comprehensive metabolic panel (BMP + Alb, Alk Phos, ALT, AST, Total. Bili, TP); Future  - CBC with platelets; Future  - Carbamazepine total; Future  - Keppra (Levetiracetam) Level; Future  - Comprehensive metabolic panel (BMP + Alb, Alk Phos, ALT, AST, Total. Bili, TP)  - CBC with platelets  - Carbamazepine total  - Keppra (Levetiracetam) Level    4. Hypothyroidism, unspecified type  6. Other specified hypothyroidism  Patient has hypothyroid - uses brand name Synthroid - check TSH  - TSH; Future  - TSH    5. Generalized anxiety disorder  Patient as general anxiety - relatively controlled with help from family    7. Chronic rhinitis  H/o chronic rhinitis - add Loratadine   - loratadine (CLARITIN) 10 MG tablet; Take 1 tablet (10 mg) by mouth daily as needed for allergies  Dispense: 90 tablet; Refill: 3    8. Constipation, unspecified constipation type  Patient with constipation - did better with use of Metamucil/fiber - will refill   - psyllium (METAMUCIL SMOOTH TEXTURE) 28 % packet; 1 packet in 8 oz water po daily  Dispense: 90 each; Refill: 3    9. Need for COVID-19 vaccine  Due for COVID vaccine - ordered   -  "COVID-19,PF,PFIZER (12+ YRS)    10. Need for tetanus booster  Due for tetanus booster - ordered   - TDAP VACCINE (Adacel, Boostrix)  [[id number]]    11. Visit for screening mammogram  Due for breast cancer screening - ordered mammogram  - MA SCREENING DIGITAL BILAT - Future  (s+30); Future    12. Screen for colon cancer  Due for colon cancer screening - discussed - agrees with Cologuard  - COLOGUARD(EXACT SCIENCES)    13. Cervical cancer screening  Due for cervix cancer screening - declines        Patient has been advised of split billing requirements and indicates understanding: Yes    COUNSELING:  Reviewed preventive health counseling, as reflected in patient instructions       Regular exercise       Healthy diet/nutrition    Estimated body mass index is 28.78 kg/m  as calculated from the following:    Height as of this encounter: 1.535 m (5' 0.43\").    Weight as of this encounter: 67.8 kg (149 lb 8 oz).    Weight management plan: Discussed healthy diet and exercise guidelines    She reports that she has quit smoking. She has never used smokeless tobacco.      Appropriate preventive services were discussed with this patient, including applicable screening as appropriate for cardiovascular disease, diabetes, osteopenia/osteoporosis, and glaucoma.  As appropriate for age/gender, discussed screening for colorectal cancer, prostate cancer, breast cancer, and cervical cancer. Checklist reviewing preventive services available has been given to the patient.    Reviewed patients plan of care and provided an AVS. The Basic Care Plan (routine screening as documented in Health Maintenance) for [place] meets the Care Plan requirement. This Care Plan has been established and reviewed with the Patient.    Counseling Resources:  ATP IV Guidelines  Pooled Cohorts Equation Calculator  Breast Cancer Risk Calculator  Breast Cancer: Medication to Reduce Risk  FRAX Risk Assessment  ICSI Preventive Guidelines  Dietary Guidelines for " Americans, 2010  USDA's MyPlate  ASA Prophylaxis  Lung CA Screening    CONNOR TIWARI MD  [facility]    Identified Health Risks:

## 2022-03-28 NOTE — LETTER
April 1, 2022      Kerry QUINTANA Tongpérezneda  [address] [place] [address]        Dear ,    We are writing to inform you of your test results.    Labs are reassuring/normal.      Resulted Orders   Comprehensive metabolic panel (BMP + Alb, Alk Phos, ALT, AST, Total. Bili, TP)   Result Value Ref Range    Sodium 139 136 - 145 mmol/L    Potassium 3.6 3.5 - 5.0 mmol/L    Chloride 98 98 - 107 mmol/L    Carbon Dioxide (CO2) 31 22 - 31 mmol/L    Anion Gap 10 5 - 18 mmol/L    Urea Nitrogen 17 8 - 22 mg/dL    Creatinine 1.08 0.60 - 1.10 mg/dL    Calcium 9.3 8.5 - 10.5 mg/dL    Glucose 108 70 - 125 mg/dL    Alkaline Phosphatase 136 (H) 45 - 120 U/L    AST 23 0 - 40 U/L    ALT 18 0 - 45 U/L    Protein Total 7.6 6.0 - 8.0 g/dL    Albumin 4.0 3.5 - 5.0 g/dL    Bilirubin Total 0.3 0.0 - 1.0 mg/dL    GFR Estimate 58 (L) >60 mL/min/1.73m2      Comment:      Effective December 21, 2021 eGFRcr in adults is calculated using the 2021 CKD-EPI creatinine equation which includes age and gender (Elenita et al., [facility], DOI: 10.1056/EDCFea3647046)   CBC with platelets   Result Value Ref Range    WBC Count 8.8 4.0 - 11.0 10e3/uL    RBC Count 4.45 3.80 - 5.20 10e6/uL    Hemoglobin 14.3 11.7 - 15.7 g/dL    Hematocrit 43.4 35.0 - 47.0 %    MCV 98 78 - 100 fL    MCH 32.1 26.5 - 33.0 pg    MCHC 32.9 31.5 - 36.5 g/dL    RDW 11.8 10.0 - 15.0 %    Platelet Count 158 150 - 450 10e3/uL   Carbamazepine total   Result Value Ref Range    Carbamazepine 5.4   ug/mL      Comment:      Therapeutic Range:   4-12 ug/L   Keppra (Levetiracetam) Level   Result Value Ref Range    Keppra (Levetiracetam) Level 40 10 - 40 ug/mL      Comment:      INTERPRETIVE INFORMATION: Keppra (Levetiracetam)    Therapeutic Range:  10-40 ug/mL              Toxic:  Not well Established    Pharmacokinetics of levetiracetam are affected by renal   function. Adverse effects may include somnolence, weakness,   headache and vomiting.    This levetiracetam (Keppra) immunoassay uses  the [employer]   Diagnostics reagents, which has known cross-reactivity with   the drug brivaracetam (Briviact) and may report inaccurate   results. Patients transitioning from levetiracetam to   brivaracetam or those who are using both medications should   not monitor drug concentrations with the [employer]   assay. These patients should be monitored using a validated   chromatographic methodology that distinguishes between   drugs to determine drug concentrations.  Performed By: [facility]  [address] [place] [id number]  : Mara Mitchell MD   TSH   Result Value Ref Range    TSH 1.60 0.30 - 5.00 uIU/mL       If you have any questions or concerns, please call the clinic at the number listed above.       Sincerely,      Ada Espinoza MD

## 2022-03-28 NOTE — PATIENT INSTRUCTIONS

## 2022-03-29 LAB — LEVETIRACETAM SERPL-MCNC: 40 UG/ML

## 2022-03-31 DIAGNOSIS — R60.0 LOWER EXTREMITY EDEMA: ICD-10-CM

## 2022-04-01 RX ORDER — TRIAMTERENE AND HYDROCHLOROTHIAZIDE 37.5; 25 MG/1; MG/1
CAPSULE ORAL
Qty: 90 CAPSULE | Refills: 1 | Status: SHIPPED | OUTPATIENT
Start: 2022-04-01 | End: 2022-10-04

## 2022-04-01 NOTE — TELEPHONE ENCOUNTER
"  Routing refill request to provider for review/approval because:  Provider to review, unsure if changes after last visit, note note completed yet and hospitalizations in the last 3 months.    Last Written Prescription Date:    triamterene-HCTZ (DYAZIDE) 37.5-25 MG capsule 90 capsule 1 9/29/2021  No   Sig: TAKE 1 CAPSULE BY MOUTH DAILY AS NEEDED   Sent to pharmacy as: Triamterene-HCTZ 37.5-25 MG Oral Capsule (DYAZIDE)   Class: E-Prescribe   Order: [id number]   E-Prescribing Status: Receipt confirmed by pharmacy (9/29/2021 10:49 AM CDT)       Last office visit provider:  3/28/22     Requested Prescriptions   Pending Prescriptions Disp Refills     triamterene-HCTZ (DYAZIDE) 37.5-25 MG capsule [Pharmacy Med Name: TRIAMTERENE 37.5MG/ HCTZ 25MG CAPS] 90 capsule 1     Sig: TAKE 1 CAPSULE BY MOUTH DAILY AS NEEDED       Diuretics (Including Combos) Protocol Passed - 3/31/2022  3:50 AM        Passed - Blood pressure under 140/90 in past 12 months     BP Readings from Last 3 Encounters:   03/28/22 125/73   06/28/21 (!) 83/46   02/24/21 120/87                 Passed - Recent (12 mo) or future (30 days) visit within the authorizing provider's specialty     Patient has had an office visit with the authorizing provider or a provider within the authorizing providers department within the previous 12 mos or has a future within next 30 days. See \"Patient Info\" tab in inbasket, or \"Choose Columns\" in Meds & Orders section of the refill encounter.              Passed - Medication is active on med list        Passed - Patient is age 18 or older        Passed - No active pregancy on record        Passed - Normal serum creatinine on file in past 12 months     Recent Labs   Lab Test 03/28/22  1456   CR 1.08              Passed - Normal serum potassium on file in past 12 months     Recent Labs   Lab Test 03/28/22  1456   POTASSIUM 3.6                    Passed - Normal serum sodium on file in past 12 months     Recent Labs   Lab Test " 03/28/22  1456                 Passed - No positive pregnancy test in past 12 months             Elisa Osorio RN 04/01/22 2:52 PM

## 2022-04-02 DIAGNOSIS — E03.8 OTHER SPECIFIED HYPOTHYROIDISM: ICD-10-CM

## 2022-04-03 ASSESSMENT — ENCOUNTER SYMPTOMS
ENDOCRINE NEGATIVE: 1
POLYDIPSIA: 0
ACTIVITY CHANGE: 1
DIARRHEA: 1
SORE THROAT: 0
VOMITING: 1
COUGH: 0
ALLERGIC/IMMUNOLOGIC NEGATIVE: 1
SHORTNESS OF BREATH: 0
NERVOUS/ANXIOUS: 1
ARTHRALGIAS: 1
SEIZURES: 1
ABDOMINAL PAIN: 0

## 2022-04-04 RX ORDER — LEVOTHYROXINE SODIUM 50 MCG
TABLET ORAL
Qty: 90 TABLET | Refills: 3 | Status: SHIPPED | OUTPATIENT
Start: 2022-04-04 | End: 2023-02-03

## 2022-04-04 NOTE — TELEPHONE ENCOUNTER
"Routing refill request to provider for review/approval because:  Early refill requested.    Last Written Prescription Date:  2/1/22  Last Fill Quantity: 90,  # refills: 0   Last office visit provider:  3/28/22     Requested Prescriptions   Pending Prescriptions Disp Refills     SYNTHROID 50 MCG tablet [Pharmacy Med Name: SYNTHROID 0.05MG (50MCG)TABLETS] 90 tablet 0     Sig: TAKE 1 TABLET BY MOUTH DAILY EVERY       Thyroid Protocol Passed - 4/4/2022  2:07 PM        Passed - Patient is 12 years or older        Passed - Recent (12 mo) or future (30 days) visit within the authorizing provider's specialty     Patient has had an office visit with the authorizing provider or a provider within the authorizing providers department within the previous 12 mos or has a future within next 30 days. See \"Patient Info\" tab in inbasket, or \"Choose Columns\" in Meds & Orders section of the refill encounter.              Passed - Medication is active on med list        Passed - Normal TSH on file in past 12 months     Recent Labs   Lab Test 03/28/22  1456   TSH 1.60              Passed - No active pregnancy on record     If patient is pregnant or has had a positive pregnancy test, please check TSH.          Passed - No positive pregnancy test in past 12 months     If patient is pregnant or has had a positive pregnancy test, please check TSH.               Justice Brownlee RN 04/04/22 2:07 PM  "

## 2022-04-21 ENCOUNTER — TELEPHONE (OUTPATIENT)
Dept: FAMILY MEDICINE | Facility: CLINIC | Age: 62
End: 2022-04-21
Payer: MEDICARE

## 2022-04-21 DIAGNOSIS — Z76.0 ENCOUNTER FOR MEDICATION REFILL: ICD-10-CM

## 2022-04-21 DIAGNOSIS — G40.A09 NONINTRACTABLE ABSENCE EPILEPSY WITHOUT STATUS EPILEPTICUS (H): ICD-10-CM

## 2022-04-21 RX ORDER — LEVETIRACETAM 1000 MG/1
1000 TABLET ORAL 2 TIMES DAILY
Qty: 180 TABLET | Refills: 3 | Status: CANCELLED | OUTPATIENT
Start: 2022-04-21

## 2022-04-21 NOTE — TELEPHONE ENCOUNTER
Reason for Call:  Medication or medication refill:    Do you use a [facility]?  Name of the pharmacy and phone number for the current request:  [facility] [address]    Name of the medication requested: levETIRAcetam (KEPPRA) 1000 MG tablet    Other request: Patient called to request provider resend a new RX to pharmacy for extended release of this medication. Patient is also requesting for name brand and not generic.     Can we leave a detailed message on this number? YES    Phone number patient can be reached at: Home number on file [phone] (home)    Best Time: any    Call taken on 4/21/2022 at 8:03 AM by Irwin Sanon

## 2022-04-21 NOTE — TELEPHONE ENCOUNTER
Provider sent order on 3/22/2022. CMA unable to queue and pend for doctor because patient wants to change to extended release. Defer to prescriber for review and sending if indicated.

## 2022-04-22 RX ORDER — LEVETIRACETAM 500 MG
1000 TABLET, EXTENDED RELEASE 24 HR ORAL 2 TIMES DAILY
Qty: 180 TABLET | Refills: 3 | Status: SHIPPED | OUTPATIENT
Start: 2022-04-22 | End: 2023-01-11

## 2022-04-22 NOTE — TELEPHONE ENCOUNTER
I'm unable to find 1000 mg XR tablets (brand name) in list.  So - sent brand name 500 mg XR, 2 tabs BID.

## 2022-05-17 ENCOUNTER — OFFICE VISIT (OUTPATIENT)
Dept: FAMILY MEDICINE | Facility: CLINIC | Age: 62
End: 2022-05-17
Attending: FAMILY MEDICINE
Payer: MEDICARE

## 2022-05-17 VITALS
HEART RATE: 102 BPM | WEIGHT: 146.75 LBS | SYSTOLIC BLOOD PRESSURE: 114 MMHG | TEMPERATURE: 97.9 F | OXYGEN SATURATION: 95 % | BODY MASS INDEX: 28.25 KG/M2 | DIASTOLIC BLOOD PRESSURE: 80 MMHG

## 2022-05-17 DIAGNOSIS — Z12.11 SCREEN FOR COLON CANCER: ICD-10-CM

## 2022-05-17 DIAGNOSIS — F41.1 GENERALIZED ANXIETY DISORDER: ICD-10-CM

## 2022-05-17 DIAGNOSIS — Z12.31 VISIT FOR SCREENING MAMMOGRAM: ICD-10-CM

## 2022-05-17 DIAGNOSIS — Z02.89 ENCOUNTER FOR COMPLETION OF FORM WITH PATIENT: ICD-10-CM

## 2022-05-17 DIAGNOSIS — R56.9 CONVULSIONS, UNSPECIFIED CONVULSION TYPE (H): Primary | ICD-10-CM

## 2022-05-17 DIAGNOSIS — Z12.4 CERVICAL CANCER SCREENING: ICD-10-CM

## 2022-05-17 PROCEDURE — 0052A COVID-19,PF,PFIZER (12+ YRS): CPT | Performed by: FAMILY MEDICINE

## 2022-05-17 PROCEDURE — 91305 COVID-19,PF,PFIZER (12+ YRS): CPT | Performed by: FAMILY MEDICINE

## 2022-05-17 PROCEDURE — 99214 OFFICE O/P EST MOD 30 MIN: CPT | Mod: 25 | Performed by: FAMILY MEDICINE

## 2022-05-17 RX ORDER — QUETIAPINE FUMARATE 25 MG/1
25-50 TABLET, FILM COATED ORAL AT BEDTIME
Qty: 60 TABLET | Refills: 1 | Status: SHIPPED | OUTPATIENT
Start: 2022-05-17 | End: 2024-01-28

## 2022-05-17 NOTE — PROGRESS NOTES
1. Convulsions, unspecified convulsion type (H)  This is a 62 yo female with seizures - history is vague - indicates perhaps she is having some trouble with her medications; thinks she could benefit from medical marijuana.  I think she should see neurology and discuss options.    - Adult Neurology  Referral; Future    2. Generalized anxiety disorder  Patient has significant anxiety - add quetiapine.    - QUEtiapine (SEROQUEL) 25 MG tablet; Take 1-2 tablets (25-50 mg) by mouth At Bedtime  Dispense: 60 tablet; Refill: 1    3. Encounter for completion of form with patient  Patient brings in [employer] forms to be completed - reviewed with patient; copies made for chart    4. Visit for screening mammogram  Due for breast cancer screening - reviewed    5. Screen for colon cancer  Due for colon cancer screening - reviewed    6. Cervical cancer screening  Due for cervix cancer screening - reviewed      Chief Complaint   Patient presents with     Immunization     Forms     [employer]       Subjective   [place] is a 61 year old who presents for the following health issues     Taking 2 fiber pills/day - seems to help her IBS  Tomorrow will do Cologuard - ordered previously   Smokes marijuana, uses gummies, CBD   Wants medical marijuana because those are too expensive; ex-boyfriend is going to help her pay for it               Review of Systems   Constitutional: Negative for chills, fatigue, fever and unexpected weight change.   HENT: Negative for congestion and sore throat.    Eyes: Negative for visual disturbance.   Respiratory: Negative for cough and shortness of breath.    Cardiovascular: Negative for chest pain and peripheral edema.   Gastrointestinal: Negative for abdominal pain.   Endocrine: Negative for polydipsia and polyuria.   Breasts:  negative.    Genitourinary: Negative.    Musculoskeletal: Positive for arthralgias.   Skin: Negative.    Allergic/Immunologic: Negative.    Neurological: Positive  for seizures and weakness.   All other systems reviewed and are negative.           Objective    /80 (BP Location: Right arm, Patient Position: Sitting, Cuff Size: Adult Regular)   Pulse 102   Temp 97.9  F (36.6  C)   Wt 66.6 kg (146 lb 12 oz)   LMP  (LMP Unknown)   SpO2 95%   BMI 28.25 kg/m    Body mass index is 28.25 kg/m .  Physical Exam  Constitutional:       General: She is not in acute distress.     Appearance: She is well-developed.   HENT:      Right Ear: Tympanic membrane and external ear normal.      Left Ear: Tympanic membrane and external ear normal.      Nose: Nose normal.      Mouth/Throat:      Pharynx: No oropharyngeal exudate.   Eyes:      General:         Right eye: No discharge.         Left eye: No discharge.      Conjunctiva/sclera: Conjunctivae normal.      Pupils: Pupils are equal, round, and reactive to light.   Neck:      Thyroid: No thyromegaly.      Trachea: No tracheal deviation.   Cardiovascular:      Rate and Rhythm: Normal rate and regular rhythm.      Pulses: Normal pulses.      Heart sounds: Normal heart sounds, S1 normal and S2 normal. No murmur heard.    No friction rub. No S3 or S4 sounds.   Pulmonary:      Effort: Pulmonary effort is normal. No respiratory distress.      Breath sounds: Normal breath sounds. No wheezing or rales.   Abdominal:      General: Bowel sounds are normal.      Palpations: Abdomen is soft. There is no mass.      Tenderness: There is no abdominal tenderness.   Musculoskeletal:         General: Normal range of motion.      Cervical back: Neck supple.   Lymphadenopathy:      Cervical: No cervical adenopathy.   Skin:     General: Skin is warm and dry.      Findings: No rash.   Neurological:      General: No focal deficit present.      Mental Status: She is alert and oriented to person, place, and time.      Motor: No abnormal muscle tone.      Deep Tendon Reflexes: Reflexes are normal and symmetric.   Psychiatric:         Thought Content: Thought  content normal.         Judgment: Judgment normal.            No results found for any visits on 05/17/22.

## 2022-05-18 DIAGNOSIS — M62.838 MUSCLE SPASM: ICD-10-CM

## 2022-05-19 NOTE — TELEPHONE ENCOUNTER
Routing refill request to provider for review/approval because:  Drug not on the [facility] refill protocol     Last Written Prescription Date:  4/19/22  Last Fill Quantity: 30,  # refills: 0   Last office visit provider:  5/17/22     Requested Prescriptions   Pending Prescriptions Disp Refills     tiZANidine (ZANAFLEX) 4 MG tablet [Pharmacy Med Name: TIZANIDINE 4MG TABLETS] 30 tablet 0     Sig: TAKE 1 TABLET(4 MG) BY MOUTH EVERY NIGHT AS NEEDED FOR MUSCLE SPASMS       There is no refill protocol information for this order          Justice Brownlee RN 05/19/22 8:52 AM

## 2022-05-22 ASSESSMENT — ENCOUNTER SYMPTOMS
FATIGUE: 0
COUGH: 0
ARTHRALGIAS: 1
SORE THROAT: 0
SHORTNESS OF BREATH: 0
ABDOMINAL PAIN: 0
CHILLS: 0
UNEXPECTED WEIGHT CHANGE: 0
WEAKNESS: 1
SEIZURES: 1
POLYDIPSIA: 0
ALLERGIC/IMMUNOLOGIC NEGATIVE: 1
FEVER: 0

## 2022-06-01 DIAGNOSIS — R06.02 SHORTNESS OF BREATH: ICD-10-CM

## 2022-06-01 DIAGNOSIS — Z76.0 ENCOUNTER FOR MEDICATION REFILL: ICD-10-CM

## 2022-06-02 NOTE — TELEPHONE ENCOUNTER
"Last Written Prescription Date:  3/17/22  Last Fill Quantity: 8.5g,  # refills: 3   Last office visit provider:  5/17/22    Routing refill request to provider for review/approval because:  Routing to provider to review frequency of use as this would mean the pt has used 3-4 inhalers within the last 2 months.      Requested Prescriptions   Pending Prescriptions Disp Refills     albuterol (PROAIR HFA/PROVENTIL HFA/VENTOLIN HFA) 108 (90 Base) MCG/ACT inhaler [Pharmacy Med Name: ALBUTEROL HFA INH (200 PUFFS)8.5GM] 8.5 g 3     Sig: INHALE 2 PUFFS BY MOUTH EVERY 4 HOURS AS NEEDED FOR WHEEZING       Asthma Maintenance Inhalers - Anticholinergics Passed - 6/1/2022  3:51 AM        Passed - Patient is age 12 years or older        Passed - Recent (12 mo) or future (30 days) visit within the authorizing provider's specialty     Patient has had an office visit with the authorizing provider or a provider within the authorizing providers department within the previous 12 mos or has a future within next 30 days. See \"Patient Info\" tab in inbasket, or \"Choose Columns\" in Meds & Orders section of the refill encounter.              Passed - Medication is active on med list       Short-Acting Beta Agonist Inhalers Protocol  Passed - 6/1/2022  3:51 AM        Passed - Patient is age 12 or older        Passed - Recent (12 mo) or future (30 days) visit within the authorizing provider's specialty     Patient has had an office visit with the authorizing provider or a provider within the authorizing providers department within the previous 12 mos or has a future within next 30 days. See \"Patient Info\" tab in inbasket, or \"Choose Columns\" in Meds & Orders section of the refill encounter.              Passed - Medication is active on med list             Moraima Bryan RN 06/02/22 1:43 PM  "

## 2022-06-03 RX ORDER — ALBUTEROL SULFATE 90 UG/1
AEROSOL, METERED RESPIRATORY (INHALATION)
Qty: 8.5 G | Refills: 3 | Status: SHIPPED | OUTPATIENT
Start: 2022-06-03 | End: 2022-09-16

## 2022-06-16 DIAGNOSIS — G40.A09 NONINTRACTABLE ABSENCE EPILEPSY WITHOUT STATUS EPILEPTICUS (H): ICD-10-CM

## 2022-06-16 DIAGNOSIS — Z76.0 ENCOUNTER FOR MEDICATION REFILL: ICD-10-CM

## 2022-06-17 RX ORDER — CARBAMAZEPINE 200 MG/1
TABLET ORAL
Qty: 180 TABLET | Refills: 3 | OUTPATIENT
Start: 2022-06-17

## 2022-06-17 NOTE — TELEPHONE ENCOUNTER
Refill denied, pt should have refills on file.     Amber Knight RN  [place] Nurse Advisor  06/17/22  1:29 AM

## 2022-06-27 DIAGNOSIS — M62.838 MUSCLE SPASM: ICD-10-CM

## 2022-06-28 RX ORDER — TIZANIDINE HYDROCHLORIDE 2 MG/1
CAPSULE, GELATIN COATED ORAL
Qty: 30 CAPSULE | Refills: 1 | Status: SHIPPED | OUTPATIENT
Start: 2022-06-28 | End: 2022-10-11

## 2022-06-28 NOTE — TELEPHONE ENCOUNTER
Routing refill request to provider for review/approval because:  Drug not on the [facility] refill protocol   Drug not active on patient's medication list  Pt has 4 mg    Last Written Prescription Date:    Last Fill Quantity: ,  # refills:    Last office visit provider:  5/17/22     Requested Prescriptions   Pending Prescriptions Disp Refills     tiZANidine (ZANAFLEX) 2 MG capsule [Pharmacy Med Name: TIZANIDINE 2MG CAPSULES] 30 capsule 1     Sig: TAKE 1 CAPSULE(2 MG) BY MOUTH EVERY NIGHT AS NEEDED FOR MUSCLE SPASM       There is no refill protocol information for this order          Suki Anna, RN 06/27/22 7:32 PM

## 2022-08-07 DIAGNOSIS — M62.838 MUSCLE SPASM: ICD-10-CM

## 2022-08-07 NOTE — TELEPHONE ENCOUNTER
Routing refill request to provider for review/approval because:  Drug not on the [facility] Refill Protocol    Last Written Prescription Date:  6/28/22  Last Fill Quantity: 30,  # refills: 1   Last office visit provider:  5/17/22     Requested Prescriptions   Pending Prescriptions Disp Refills     tiZANidine (ZANAFLEX) 4 MG tablet [Pharmacy Med Name: TIZANIDINE 4MG TABLETS] 30 tablet 0     Sig: TAKE 1 TABLET(4 MG) BY MOUTH EVERY NIGHT AS NEEDED FOR MUSCLE SPASMS       There is no refill protocol information for this order          Moraima Bryan RN 08/07/22 4:23 AM

## 2022-08-08 ENCOUNTER — TELEPHONE (OUTPATIENT)
Dept: FAMILY MEDICINE | Facility: CLINIC | Age: 62
End: 2022-08-08

## 2022-08-09 NOTE — TELEPHONE ENCOUNTER
Central Prior Authorization Team   Phone: [phone]    PA Initiation    Medication: TiZANidine HCl 2MG OR CAPS  Insurance Company: [employer] Part D - Phone [phone] Fax [phone]  Pharmacy Filling the Rx: [id number] - [place], [place] - [address] AT [facility] [address]  Filling Pharmacy Phone: [phone]  Filling Pharmacy Fax:    Start Date: 8/9/2022

## 2022-08-10 NOTE — TELEPHONE ENCOUNTER
Prior Authorization Approval    Authorization Effective Date: 5/11/2022  Authorization Expiration Date: 8/9/2023  Medication: TiZANidine HCl 2MG OR CAPS  Approved Dose/Quantity:    Reference #:     Insurance Company: [employer] Part D - Phone [phone] Fax [phone]  Expected CoPay:       CoPay Card Available:      Foundation Assistance Needed:    Which Pharmacy is filling the prescription (Not needed for infusion/clinic administered): "[id number] - [place], [place] - [address] [place] [address] AT [facility] & [address]  Pharmacy Notified: Yes  Patient Notified: Yes

## 2022-08-30 ENCOUNTER — OFFICE VISIT (OUTPATIENT)
Dept: FAMILY MEDICINE | Facility: CLINIC | Age: 62
End: 2022-08-30
Payer: MEDICARE

## 2022-08-30 VITALS
OXYGEN SATURATION: 98 % | DIASTOLIC BLOOD PRESSURE: 56 MMHG | SYSTOLIC BLOOD PRESSURE: 88 MMHG | WEIGHT: 140 LBS | BODY MASS INDEX: 26.95 KG/M2 | RESPIRATION RATE: 16 BRPM | HEART RATE: 63 BPM | TEMPERATURE: 98.1 F

## 2022-08-30 DIAGNOSIS — H65.91 OME (OTITIS MEDIA WITH EFFUSION), RIGHT: Primary | ICD-10-CM

## 2022-08-30 DIAGNOSIS — Z12.31 VISIT FOR SCREENING MAMMOGRAM: ICD-10-CM

## 2022-08-30 DIAGNOSIS — Z12.11 SCREEN FOR COLON CANCER: ICD-10-CM

## 2022-08-30 DIAGNOSIS — Z12.4 CERVICAL CANCER SCREENING: ICD-10-CM

## 2022-08-30 PROCEDURE — 99213 OFFICE O/P EST LOW 20 MIN: CPT | Performed by: FAMILY MEDICINE

## 2022-08-30 RX ORDER — TRIAMTERENE AND HYDROCHLOROTHIAZIDE 37.5; 25 MG/1; MG/1
1 CAPSULE ORAL
COMMUNITY
End: 2022-10-11

## 2022-08-30 RX ORDER — CETIRIZINE HYDROCHLORIDE 10 MG/1
10 TABLET ORAL DAILY PRN
COMMUNITY

## 2022-08-30 RX ORDER — ACETAMINOPHEN 500 MG
1000 TABLET ORAL
COMMUNITY
Start: 2021-02-27

## 2022-08-30 RX ORDER — LIDOCAINE 4 G/G
PATCH TOPICAL
COMMUNITY
Start: 2022-01-26 | End: 2023-01-26

## 2022-08-30 RX ORDER — HYDROXYZINE PAMOATE 25 MG/1
25-50 CAPSULE ORAL
COMMUNITY
Start: 2021-02-27 | End: 2022-10-11

## 2022-08-30 RX ORDER — LEVOTHYROXINE SODIUM 50 UG/1
50 TABLET ORAL
COMMUNITY
End: 2022-10-11

## 2022-08-30 RX ORDER — CARBAMAZEPINE 200 MG/1
200 TABLET ORAL
COMMUNITY
End: 2022-10-11

## 2022-08-30 RX ORDER — ASPIRIN 325 MG
325 TABLET ORAL
COMMUNITY
Start: 2021-02-27 | End: 2024-01-28

## 2022-08-30 RX ORDER — TIZANIDINE HYDROCHLORIDE 2 MG/1
2 CAPSULE, GELATIN COATED ORAL
COMMUNITY
End: 2022-10-11

## 2022-08-30 RX ORDER — LEVETIRACETAM 1000 MG/1
1000 TABLET ORAL
COMMUNITY
End: 2022-10-11 | Stop reason: ALTCHOICE

## 2022-08-30 RX ORDER — LOPERAMIDE HCL 2 MG
CAPSULE ORAL
COMMUNITY
Start: 2022-04-01 | End: 2022-10-11

## 2022-08-30 RX ORDER — LORATADINE 10 MG/1
10 TABLET ORAL
COMMUNITY
End: 2022-10-11

## 2022-08-30 RX ORDER — AMOXICILLIN 250 MG
1 CAPSULE ORAL
COMMUNITY
Start: 2021-02-27 | End: 2022-10-11

## 2022-08-30 RX ORDER — SULFAMETHOXAZOLE/TRIMETHOPRIM 800-160 MG
1 TABLET ORAL 2 TIMES DAILY
Qty: 14 TABLET | Refills: 0 | Status: SHIPPED | OUTPATIENT
Start: 2022-08-30 | End: 2022-09-06

## 2022-08-30 RX ORDER — ALBUTEROL SULFATE 90 UG/1
2 AEROSOL, METERED RESPIRATORY (INHALATION)
COMMUNITY
End: 2022-10-11

## 2022-08-30 NOTE — PROGRESS NOTES
"  Assessment & Plan     OME (otitis media with effusion), right  Treatment option discussed with patient, she is allergic to multiple antibiotic including penicillin, azithromycin etc., discussed Bactrim, possible side effect reviewed, new prescription sent, consider referral to ENT if not improved, she also responded well to ear lavage today with successful removal of earwax.  - sulfamethoxazole-trimethoprim (BACTRIM DS) 800-160 MG tablet; Take 1 tablet by mouth 2 times daily for 7 days        Review of external notes as documented elsewhere in note  20 minutes spent on the date of the encounter doing chart review, review of outside records, review of test results, interpretation of tests, patient visit and documentation            No follow-ups on file.    Mona Dean MD  [facility] is a 62 year old accompanied by her sister, presenting for the following health issues:  Ear Problem (Pt has been extreme pain in her right ear, no medication is helping. Right shoulder pain has been bothering her pt stated that she can't sleep on that side.)      History of Present Illness       Headaches:   Since the patient's last clinic visit, headaches are: no change  The patient is getting headaches:  Due to ear infection  She is not able to do normal daily activities when she has a migraine.  The patient is taking the following rescue/relief medications:  Ibuprofen (Advil, Motrin) and Tylenol   Patient states \"I get only a small amount of relief\" from the rescue/relief medications.   The patient is taking the following medications to prevent migraines:  No medications to prevent migraines  In the past 4 weeks, the patient has gone to an Urgent Care or Emergency Room 0 times times due to headaches.    Reason for visit:  Ear ache clean ears  Symptom onset:  1-2 weeks ago    She eats 0-1 servings of fruits and vegetables daily.She consumes 2 sweetened beverage(s) daily.She " exercises with enough effort to increase her heart rate 9 or less minutes per day.  She exercises with enough effort to increase her heart rate 3 or less days per week. She is missing 7 dose(s) of medications per week.     She is here today with right ear pain for the past couple of weeks, she did try to clean it with no improvement, has had issue with her ear in the past.  She had cold symptoms with runny nose cough a few weeks ago.  No fever or chills recently.    Review of Systems   Constitutional, HEENT, cardiovascular, pulmonary, gi and gu systems are negative, except as otherwise noted.      Objective    LMP  (LMP Unknown)   There is no height or weight on file to calculate BMI.  Physical Exam  Constitutional:       Appearance: Normal appearance.   HENT:      Head: Normocephalic and atraumatic.      Right Ear: Tenderness present. A middle ear effusion is present. There is impacted cerumen.      Left Ear: Hearing normal.   Cardiovascular:      Rate and Rhythm: Normal rate and regular rhythm.   Pulmonary:      Effort: Pulmonary effort is normal.      Breath sounds: Normal breath sounds.   Musculoskeletal:         General: No swelling.      Cervical back: Normal range of motion.   Neurological:      General: No focal deficit present.      Mental Status: She is alert.   Psychiatric:         Behavior: Behavior normal.         Thought Content: Thought content normal.         Judgment: Judgment normal.                    .  ..

## 2022-09-16 DIAGNOSIS — Z76.0 ENCOUNTER FOR MEDICATION REFILL: ICD-10-CM

## 2022-09-16 DIAGNOSIS — R06.02 SHORTNESS OF BREATH: ICD-10-CM

## 2022-09-16 RX ORDER — ALBUTEROL SULFATE 90 UG/1
AEROSOL, METERED RESPIRATORY (INHALATION)
Qty: 8.5 G | Refills: 5 | Status: SHIPPED | OUTPATIENT
Start: 2022-09-16 | End: 2023-02-07

## 2022-09-16 NOTE — TELEPHONE ENCOUNTER
"Last Written Prescription Date:  6/3/22  Last Fill Quantity: 8.5 g,  # refills: 3   Last office visit provider:  8/30/22     Requested Prescriptions   Pending Prescriptions Disp Refills     albuterol (PROAIR HFA/PROVENTIL HFA/VENTOLIN HFA) 108 (90 Base) MCG/ACT inhaler [Pharmacy Med Name: ALBUTEROL HFA INH (200 PUFFS)8.5GM] 8.5 g 3     Sig: INHALE 2 PUFFS BY MOUTH EVERY 4 HOURS AS NEEDED FOR WHEEZING       Asthma Maintenance Inhalers - Anticholinergics Passed - 9/16/2022  3:23 PM        Passed - Patient is age 12 years or older        Passed - Recent (12 mo) or future (30 days) visit within the authorizing provider's specialty     Patient has had an office visit with the authorizing provider or a provider within the authorizing providers department within the previous 12 mos or has a future within next 30 days. See \"Patient Info\" tab in inbasket, or \"Choose Columns\" in Meds & Orders section of the refill encounter.              Passed - Medication is active on med list       Short-Acting Beta Agonist Inhalers Protocol  Passed - 9/16/2022  3:23 PM        Passed - Patient is age 12 or older        Passed - Recent (12 mo) or future (30 days) visit within the authorizing provider's specialty     Patient has had an office visit with the authorizing provider or a provider within the authorizing providers department within the previous 12 mos or has a future within next 30 days. See \"Patient Info\" tab in inbasket, or \"Choose Columns\" in Meds & Orders section of the refill encounter.              Passed - Medication is active on med list             Justice Brownlee RN 09/16/22 3:23 PM  "

## 2022-09-30 DIAGNOSIS — M62.838 MUSCLE SPASM: ICD-10-CM

## 2022-10-03 DIAGNOSIS — R60.0 LOWER EXTREMITY EDEMA: ICD-10-CM

## 2022-10-04 RX ORDER — TRIAMTERENE AND HYDROCHLOROTHIAZIDE 37.5; 25 MG/1; MG/1
1 CAPSULE ORAL DAILY PRN
Qty: 90 CAPSULE | Refills: 1 | Status: SHIPPED | OUTPATIENT
Start: 2022-10-04 | End: 2023-04-07

## 2022-10-04 NOTE — TELEPHONE ENCOUNTER
"Last Written Prescription Date:  4/1/22  Last Fill Quantity: 90,  # refills: 1   Last office visit provider:  8/30/22     Requested Prescriptions   Pending Prescriptions Disp Refills     triamterene-HCTZ (DYAZIDE) 37.5-25 MG capsule [Pharmacy Med Name: TRIAMTERENE 37.5MG/ HCTZ 25MG CAPS] 90 capsule 1     Sig: TAKE 1 CAPSULE BY MOUTH DAILY AS NEEDED       Diuretics (Including Combos) Protocol Passed - 10/4/2022  7:37 AM        Passed - Blood pressure under 140/90 in past 12 months     BP Readings from Last 3 Encounters:   08/30/22 (!) 88/56   05/17/22 114/80   03/28/22 125/73                 Passed - Recent (12 mo) or future (30 days) visit within the authorizing provider's specialty     Patient has had an office visit with the authorizing provider or a provider within the authorizing providers department within the previous 12 mos or has a future within next 30 days. See \"Patient Info\" tab in inbasket, or \"Choose Columns\" in Meds & Orders section of the refill encounter.              Passed - Medication is active on med list        Passed - Patient is age 18 or older        Passed - No active pregancy on record        Passed - Normal serum creatinine on file in past 12 months     Recent Labs   Lab Test 03/28/22  1456   CR 1.08              Passed - Normal serum potassium on file in past 12 months     Recent Labs   Lab Test 03/28/22  1456   POTASSIUM 3.6                    Passed - Normal serum sodium on file in past 12 months     Recent Labs   Lab Test 03/28/22  1456                 Passed - No positive pregnancy test in past 12 months             Justice Brownlee RN 10/04/22 7:38 AM  "

## 2022-10-06 PROBLEM — E83.42 HYPOMAGNESEMIA: Status: ACTIVE | Noted: 2022-01-24

## 2022-10-06 PROBLEM — E87.6 ACUTE HYPOKALEMIA: Status: ACTIVE | Noted: 2022-01-24

## 2022-10-06 PROBLEM — E87.8 DISORDER OF ELECTROLYTES: Status: ACTIVE | Noted: 2022-01-24

## 2022-10-06 PROBLEM — E83.39 HYPOPHOSPHATEMIA: Status: ACTIVE | Noted: 2022-01-24

## 2022-10-06 PROBLEM — U07.1 COVID-19 VIRUS INFECTION: Status: ACTIVE | Noted: 2022-01-24

## 2022-10-06 PROBLEM — K81.0 ACUTE CHOLECYSTITIS: Status: ACTIVE | Noted: 2020-08-19

## 2022-10-06 PROBLEM — F41.9 ANXIETY: Status: ACTIVE | Noted: 2022-01-24

## 2022-10-06 PROBLEM — M62.81 GENERALIZED MUSCLE WEAKNESS: Status: ACTIVE | Noted: 2022-01-24

## 2022-10-06 PROBLEM — R19.7 DIARRHEA: Status: ACTIVE | Noted: 2022-01-24

## 2022-10-06 PROBLEM — S82.142A CLOSED FRACTURE OF LEFT TIBIAL PLATEAU: Status: ACTIVE | Noted: 2021-02-24

## 2022-10-11 ENCOUNTER — VIRTUAL VISIT (OUTPATIENT)
Dept: FAMILY MEDICINE | Facility: CLINIC | Age: 62
End: 2022-10-11
Payer: MEDICARE

## 2022-10-11 DIAGNOSIS — M62.838 MUSCLE SPASM: ICD-10-CM

## 2022-10-11 DIAGNOSIS — Z12.11 SCREEN FOR COLON CANCER: ICD-10-CM

## 2022-10-11 DIAGNOSIS — R19.4 CHANGE IN STOOL HABITS: Primary | ICD-10-CM

## 2022-10-11 PROCEDURE — 99442 PR PHYSICIAN TELEPHONE EVALUATION 11-20 MIN: CPT | Mod: 95 | Performed by: FAMILY MEDICINE

## 2022-10-11 NOTE — PROGRESS NOTES
"Virginia is a 62 year old who is being evaluated via a billable telephone visit.      What phone number would you like to be contacted at? [phone]  How would you like to obtain your AVS? Mail a copy    1. Change in stool habits  This is a 61 yo female who complains of frequent diarrhea but alternating with constipation issues; this is concerning in general (although may be behavioral); due for colon cancer screening - educated/discussed - colonoscopy ordered  - Adult GI  Referral - Procedure Only; Future    2. Screen for colon cancer  As above  - Adult GI  Referral - Procedure Only; Future    3. Muscle spasm  Patient has muscle spasms - uses Tizanidine -  Desires 90 day supply .    - tiZANidine (ZANAFLEX) 4 MG tablet; TAKE 1 TABLET(4 MG) BY MOUTH EVERY NIGHT AS NEEDED FOR MUSCLE SPASMS Strength: 4 mg  Dispense: 90 tablet; Refill: 1      Subjective   Virginia is a 62 year old accompanied by her sister, presenting for the following health issues:  stomach issues      She's either constipated or diarrhea -   Thinks it might be irritable bowel syndrome -   Happening off/on - but didn't tell anyone  Is off her soda - sometimes 3-5 cans/day (caregiver thought corn syrup was enough to give diarrhea)  Seems like she is \"coming out of it\"  Bad pain was related to constipation -   Food diary   No colon cancer screening -     Drinks plenty of water, using Gatorade -   Is taking psyllium/fiber -   Takes Imodium when she gets a flare         Review of Systems   Constitutional: Positive for fatigue. Negative for chills and fever.   HENT: Negative.    Eyes: Negative for visual disturbance.   Respiratory: Negative.  Negative for cough and shortness of breath.    Cardiovascular: Negative.  Negative for chest pain and peripheral edema.   Gastrointestinal: Positive for abdominal pain (cramping), constipation and diarrhea.   Endocrine: Negative for polydipsia and polyuria.   Breasts:  negative.    Genitourinary: " Negative.    Musculoskeletal: Positive for arthralgias.   Skin: Negative.    Allergic/Immunologic: Negative.    Neurological: Negative.    Hematological: Does not bruise/bleed easily.   Psychiatric/Behavioral: Negative.    All other systems reviewed and are negative.           Objective         Vitals:  No vitals were obtained today due to virtual visit.    Physical Exam   healthy, alert and no distress  PSYCH: Alert and oriented times 3; coherent speech, normal   rate and volume, able to articulate logical thoughts, able   to abstract reason, no tangential thoughts, no hallucinations   or delusions  Her affect is normal  RESP: No cough, no audible wheezing, able to talk in full sentences  Remainder of exam unable to be completed due to telephone visits    No results found for any visits on 10/11/22.            Phone call duration:15 minutes  4:34-4:49

## 2022-10-16 ASSESSMENT — ENCOUNTER SYMPTOMS
BRUISES/BLEEDS EASILY: 0
NEUROLOGICAL NEGATIVE: 1
POLYDIPSIA: 0
CHILLS: 0
ALLERGIC/IMMUNOLOGIC NEGATIVE: 1
RESPIRATORY NEGATIVE: 1
ARTHRALGIAS: 1
ABDOMINAL PAIN: 1
DIARRHEA: 1
CARDIOVASCULAR NEGATIVE: 1
SHORTNESS OF BREATH: 0
FEVER: 0
FATIGUE: 1
CONSTIPATION: 1
PSYCHIATRIC NEGATIVE: 1
COUGH: 0

## 2022-12-04 DIAGNOSIS — M62.838 MUSCLE SPASM: ICD-10-CM

## 2022-12-05 NOTE — TELEPHONE ENCOUNTER
Routing refill request to provider for review/approval because:  Drug not on the [facility] refill protocol     Last Written Prescription Date:  10/11/2022  Last Fill Quantity: 90,  # refills: 1   Last office visit provider:  08/30/2022     Requested Prescriptions   Pending Prescriptions Disp Refills     tiZANidine (ZANAFLEX) 4 MG tablet [Pharmacy Med Name: TIZANIDINE 4MG TABLETS] 30 tablet      Sig: TAKE 1 TABLET(4 MG) BY MOUTH EVERY NIGHT AS NEEDED FOR MUSCLE SPASMS       There is no refill protocol information for this order          Cat Alvarez RN 12/05/22 10:50 AM

## 2023-01-11 DIAGNOSIS — G40.A09 NONINTRACTABLE ABSENCE EPILEPSY WITHOUT STATUS EPILEPTICUS (H): ICD-10-CM

## 2023-01-11 RX ORDER — LEVETIRACETAM 500 MG
1000 TABLET, EXTENDED RELEASE 24 HR ORAL 2 TIMES DAILY
Qty: 180 TABLET | Refills: 3 | Status: SHIPPED | OUTPATIENT
Start: 2023-01-11 | End: 2023-04-05

## 2023-01-11 NOTE — TELEPHONE ENCOUNTER
Medication Question or Refill        What medication are you calling about (include dose and sig)?: KEPPRA  MG 24 hr tablet    Controlled Substance Agreement on file:   CSA -- Patient Level:    CSA: None found at the patient level.       Who prescribed the medication?:     Do you need a refill? Yes: pt called in to request a refill on medication. Please look into this request and send over to pharmacy if applicable. Pt said she is almost out.  Thanks!    When did you use the medication last? N/A    Patient offered an appointment? No    Do you have any questions or concerns?  No    Preferred Pharmacy:  [id number] - [place], [place] - [address] [place] [address] AT [facility] & [address]  [address] [place] [address]  Phone: [phone] Fax: [phone]      Okay to leave a detailed message?: Yes at Home number on file [phone] (home)

## 2023-01-16 ENCOUNTER — TELEPHONE (OUTPATIENT)
Dept: FAMILY MEDICINE | Facility: CLINIC | Age: 63
End: 2023-01-16

## 2023-01-16 NOTE — TELEPHONE ENCOUNTER
"Spoke to patient on the phone.  Patient thinks medications were wrongfully dispensed from the pharmacy as they are not the same shape and color from before.  Reported since stopping the medication, symptoms stopped.  RN informed patient different manufacture can and will have different shape and color even same dose. Reviewed MAR and appeared medication is a daily as needed basis.  Patient denied edema. However, some shortness of breath.  Offer in person appointment, patient declined \"I don't see doctor in the winter.  Too hard to get around.\"  Advised to call back to clinic with any changes in medical condition.     HUSSAIN Payne, RN  [facility]      "

## 2023-01-16 NOTE — TELEPHONE ENCOUNTER
General Call      Reason for Call:  Medication question    What are your questions or concerns:  Pt called and stated that she has been taking her water pill triamterene-HCTZ (DYAZIDE) 37.5-25 MG capsule for a week now, she stop taking it on 1/12/2023 due to side affect of it. But she said the pharmacy gave her the wrong water pill. She was feeling woozy, dizzy and tired. Please call and advise, thanks!    Date of last appointment with provider: 10/11/2022    Okay to leave a detailed message?: Yes at Home number on file [phone] (home)

## 2023-02-03 DIAGNOSIS — E03.8 OTHER SPECIFIED HYPOTHYROIDISM: ICD-10-CM

## 2023-02-03 RX ORDER — LEVOTHYROXINE SODIUM 50 MCG
TABLET ORAL
Qty: 90 TABLET | Refills: 1 | Status: SHIPPED | OUTPATIENT
Start: 2023-02-03 | End: 2023-08-14

## 2023-02-03 NOTE — TELEPHONE ENCOUNTER
"Last Written Prescription Date:  4/4/22  Last Fill Quantity:90,  # refills: 3   Last office visit provider:  10/11/22    Requested Prescriptions   Pending Prescriptions Disp Refills     SYNTHROID 50 MCG tablet [Pharmacy Med Name: SYNTHROID 0.05MG (50MCG)TABLETS] 90 tablet 3     Sig: TAKE 1 TABLET BY MOUTH DAILY       Thyroid Protocol Passed - 2/3/2023  3:51 AM        Passed - Patient is 12 years or older        Passed - Recent (12 mo) or future (30 days) visit within the authorizing provider's specialty     Patient has had an office visit with the authorizing provider or a provider within the authorizing providers department within the previous 12 mos or has a future within next 30 days. See \"Patient Info\" tab in inbasket, or \"Choose Columns\" in Meds & Orders section of the refill encounter.              Passed - Medication is active on med list        Passed - Normal TSH on file in past 12 months     Recent Labs   Lab Test 03/28/22  1456   TSH 1.60              Passed - No active pregnancy on record     If patient is pregnant or has had a positive pregnancy test, please check TSH.          Passed - No positive pregnancy test in past 12 months     If patient is pregnant or has had a positive pregnancy test, please check TSH.               Moraima Bryan RN 02/03/23 3:55 PM  "

## 2023-02-06 DIAGNOSIS — R06.02 SHORTNESS OF BREATH: ICD-10-CM

## 2023-02-06 DIAGNOSIS — Z76.0 ENCOUNTER FOR MEDICATION REFILL: ICD-10-CM

## 2023-02-06 NOTE — TELEPHONE ENCOUNTER
Medication Question or Refill        What medication are you calling about (include dose and sig)?: albuterol (PROAIR HFA/PROVENTIL HFA/VENTOLIN HFA) 108 (90 Base) MCG/ACT inhaler    Preferred Pharmacy:   [facility] #[id number] - [place], [place] - [address]  [address] [place] [address]  Phone: [phone] Fax: [phone]      Controlled Substance Agreement on file:   CSA -- Patient Level:    CSA: None found at the patient level.       Who prescribed the medication?: PCP    Do you need a refill? Yes, pt called in to request a refill on medication. Please look into this request and send to pharmacy if applicable.  Thanks!    When did you use the medication last? N/A    Patient offered an appointment? No    Do you have any questions or concerns?  No      Okay to leave a detailed message?: Yes at Home number on file [phone] (home)

## 2023-02-07 RX ORDER — ALBUTEROL SULFATE 90 UG/1
2 AEROSOL, METERED RESPIRATORY (INHALATION) EVERY 4 HOURS PRN
Qty: 8.5 G | Refills: 5 | Status: SHIPPED | OUTPATIENT
Start: 2023-02-07 | End: 2023-08-18

## 2023-04-04 DIAGNOSIS — G40.A09 NONINTRACTABLE ABSENCE EPILEPSY WITHOUT STATUS EPILEPTICUS (H): ICD-10-CM

## 2023-04-04 DIAGNOSIS — Z76.0 ENCOUNTER FOR MEDICATION REFILL: ICD-10-CM

## 2023-04-04 DIAGNOSIS — E03.8 OTHER SPECIFIED HYPOTHYROIDISM: ICD-10-CM

## 2023-04-04 NOTE — TELEPHONE ENCOUNTER
Medication Question or Refill        What medication are you calling about (include dose and sig)?:   carBAMazepine (TEGRETOL) 200 MG tablet  KEPPRA  MG 24 hr tablet  SYNTHROID 50 MCG tablet    Preferred Pharmacy:     [facility] #[id number] - [place], [place] - [address] [place] [address] AT [facility] [address]  [address] [place] [address]  Phone: [phone] Fax: [phone]      Controlled Substance Agreement on file:   CSA -- Patient Level:    CSA: None found at the patient level.       Who prescribed the medication?: PCP    Do you need a refill? Yes    When did you use the medication last? NA    Patient offered an appointment? No    Do you have any questions or concerns?  No      Okay to leave a detailed message?: Yes at Home number on file [phone] (home)

## 2023-04-05 RX ORDER — CARBAMAZEPINE 200 MG/1
TABLET ORAL
Qty: 180 TABLET | Refills: 3 | Status: SHIPPED | OUTPATIENT
Start: 2023-04-05 | End: 2023-11-29

## 2023-04-05 RX ORDER — LEVOTHYROXINE SODIUM 50 MCG
TABLET ORAL
Qty: 90 TABLET | Refills: 1 | OUTPATIENT
Start: 2023-04-05

## 2023-04-05 RX ORDER — LEVETIRACETAM 500 MG
1000 TABLET, EXTENDED RELEASE 24 HR ORAL 2 TIMES DAILY
Qty: 180 TABLET | Refills: 3 | Status: SHIPPED | OUTPATIENT
Start: 2023-04-05 | End: 2023-08-18

## 2023-04-05 NOTE — TELEPHONE ENCOUNTER
"Routing refill request to provider for review/approval because:  Drug not on the [facility] refill protocol   Labs not current:  Carbamazipine      Last Written Prescription Date:  1/11/2023  Last Fill Quantity: 180,  # refills: 3   Last office visit provider:  10/11/2022    Last Written Prescription Date:  3/17/2022  Last Fill Quantity: 180,  # refills: 3   Last office visit: 8/30/2022 ; last virtual visit: 10/11/2022              Requested Prescriptions   Pending Prescriptions Disp Refills     SYNTHROID 50 MCG tablet 90 tablet 1     Sig: TAKE 1 TABLET BY MOUTH DAILY       Thyroid Protocol Failed - 4/4/2023  1:14 PM        Failed - Normal TSH on file in past 12 months     Recent Labs   Lab Test 03/28/22  1456   TSH 1.60              Passed - Patient is 12 years or older        Passed - Recent (12 mo) or future (30 days) visit within the authorizing provider's specialty     Patient has had an office visit with the authorizing provider or a provider within the authorizing providers department within the previous 12 mos or has a future within next 30 days. See \"Patient Info\" tab in inbasket, or \"Choose Columns\" in Meds & Orders section of the refill encounter.              Passed - Medication is active on med list        Passed - No active pregnancy on record     If patient is pregnant or has had a positive pregnancy test, please check TSH.          Passed - No positive pregnancy test in past 12 months     If patient is pregnant or has had a positive pregnancy test, please check TSH.             carBAMazepine (TEGRETOL) 200 MG tablet 180 tablet 3     Sig: TAKE 1 TABLET(200 MG) BY MOUTH TWICE DAILY       Anti-Seizure Meds Protocol  Failed - 4/4/2023  1:14 PM        Failed - Review Authorizing provider's last note.      Refer to last progress notes: confirm request is for original authorizing provider (cannot be through other providers).          Failed - Carbamazepine level within therapeutic range in last 26 months     No " "lab results found.    Carbamazepine level must be checked 2-4 weeks after dosage change.            Passed - Recent (12 mo) or future (30 days) visit within the authorizing provider's specialty     Patient has had an office visit with the authorizing provider or a provider within the authorizing providers department within the previous 12 mos or has a future within next 30 days. See \"Patient Info\" tab in inbasket, or \"Choose Columns\" in Meds & Orders section of the refill encounter.              Passed - Normal CBC on file in past 26 months     Recent Labs   Lab Test 03/28/22  1456   WBC 8.8   RBC 4.45   HGB 14.3   HCT 43.4                    Passed - Normal ALT or AST on file in past 26 months     Recent Labs   Lab Test 03/28/22  1456   ALT 18     Recent Labs   Lab Test 03/28/22  1456   AST 23             Passed - Normal platelet count on file in past 26 months     Recent Labs   Lab Test 03/28/22  1456                  Passed - Medication is active on med list        Passed - No active pregnancy on record        Passed - No positive pregnancy test in last 12 months           KEPPRA  MG 24 hr tablet 180 tablet 3     Sig: Take 2 tablets (1,000 mg) by mouth 2 times daily       There is no refill protocol information for this order          SUZI CASTANEDA RN 04/05/23 9:41 AM  "

## 2023-04-06 DIAGNOSIS — Z76.0 ENCOUNTER FOR MEDICATION REFILL: ICD-10-CM

## 2023-04-06 DIAGNOSIS — G40.A09 NONINTRACTABLE ABSENCE EPILEPSY WITHOUT STATUS EPILEPTICUS (H): ICD-10-CM

## 2023-04-06 DIAGNOSIS — R60.0 LOWER EXTREMITY EDEMA: ICD-10-CM

## 2023-04-06 RX ORDER — CARBAMAZEPINE 200 MG/1
TABLET ORAL
Qty: 180 TABLET | Refills: 3 | OUTPATIENT
Start: 2023-04-06

## 2023-04-06 NOTE — TELEPHONE ENCOUNTER
"Routing refill request to provider for review/approval because:  Labs not current:  Creat, K+, NA    Last Written Prescription Date:  10/4/2022  Last Fill Quantity: 90,  # refills: 1   Last office visit provider:  10/11/2022     Requested Prescriptions   Pending Prescriptions Disp Refills     triamterene-HCTZ (DYAZIDE) 37.5-25 MG capsule [Pharmacy Med Name: TRIAMTERENE 37.5MG/ HCTZ 25MG CAPS] 90 capsule 1     Sig: TAKE 1 CAPSULE BY MOUTH DAILY AS NEEDED       Diuretics (Including Combos) Protocol Failed - 4/6/2023  3:51 AM        Failed - Normal serum creatinine on file in past 12 months     Recent Labs   Lab Test 03/28/22  1456   CR 1.08              Failed - Normal serum potassium on file in past 12 months     Recent Labs   Lab Test 03/28/22  1456   POTASSIUM 3.6                    Failed - Normal serum sodium on file in past 12 months     Recent Labs   Lab Test 03/28/22  1456                 Passed - Blood pressure under 140/90 in past 12 months     BP Readings from Last 3 Encounters:   08/30/22 (!) 88/56   05/17/22 114/80   03/28/22 125/73                 Passed - Recent (12 mo) or future (30 days) visit within the authorizing provider's specialty     Patient has had an office visit with the authorizing provider or a provider within the authorizing providers department within the previous 12 mos or has a future within next 30 days. See \"Patient Info\" tab in inbasket, or \"Choose Columns\" in Meds & Orders section of the refill encounter.              Passed - Medication is active on med list        Passed - Patient is age 18 or older        Passed - No active pregancy on record        Passed - No positive pregnancy test in past 12 months         Refused Prescriptions Disp Refills     carBAMazepine (TEGRETOL) 200 MG tablet [Pharmacy Med Name: CARBAMAZEPINE 200MG TABLETS] 180 tablet 3     Sig: TAKE 1 TABLET(200 MG) BY MOUTH TWICE DAILY       Anti-Seizure Meds Protocol  Failed - 4/6/2023  3:51 AM        Failed " "- Review Authorizing provider's last note.      Refer to last progress notes: confirm request is for original authorizing provider (cannot be through other providers).          Failed - Carbamazepine level within therapeutic range in last 26 months     No lab results found.    Carbamazepine level must be checked 2-4 weeks after dosage change.            Passed - Recent (12 mo) or future (30 days) visit within the authorizing provider's specialty     Patient has had an office visit with the authorizing provider or a provider within the authorizing providers department within the previous 12 mos or has a future within next 30 days. See \"Patient Info\" tab in inbasket, or \"Choose Columns\" in Meds & Orders section of the refill encounter.              Passed - Normal CBC on file in past 26 months     Recent Labs   Lab Test 03/28/22  1456   WBC 8.8   RBC 4.45   HGB 14.3   HCT 43.4                    Passed - Normal ALT or AST on file in past 26 months     Recent Labs   Lab Test 03/28/22  1456   ALT 18     Recent Labs   Lab Test 03/28/22  1456   AST 23             Passed - Normal platelet count on file in past 26 months     Recent Labs   Lab Test 03/28/22  1456                  Passed - Medication is active on med list        Passed - No active pregnancy on record        Passed - No positive pregnancy test in last 12 months             SUZI CASTANEDA RN 04/06/23 3:39 PM  "

## 2023-04-07 RX ORDER — TRIAMTERENE AND HYDROCHLOROTHIAZIDE 37.5; 25 MG/1; MG/1
1 CAPSULE ORAL DAILY PRN
Qty: 90 CAPSULE | Refills: 1 | Status: SHIPPED | OUTPATIENT
Start: 2023-04-07 | End: 2023-11-15

## 2023-04-20 ENCOUNTER — PATIENT OUTREACH (OUTPATIENT)
Dept: CARE COORDINATION | Facility: CLINIC | Age: 63
End: 2023-04-20
Payer: MEDICARE

## 2023-04-27 ENCOUNTER — NURSE TRIAGE (OUTPATIENT)
Dept: FAMILY MEDICINE | Facility: CLINIC | Age: 63
End: 2023-04-27
Payer: MEDICARE

## 2023-04-27 NOTE — LETTER
May 2, 2023      Kerry Reed  [address]  [place] [address]        Dear Virginia,     We have been unable to reach you by phone. We are writing to inform you that you need to be seen for evaluation of your back pain. If your pain is still severe and you cannot wait until your appointment on 5/8/23, please call the clinic at [phone] so we can get you scheduled for a sooner appointment, or go to Urgent Care or the Emergency Department.       Sincerely,      CONNOR TIWARI MD and Clinic RN

## 2023-04-27 NOTE — TELEPHONE ENCOUNTER
"Nurse Triage SBAR    Is this a 2nd Level Triage? NO    Situation: mid thoracic back pain x 1 week    Background: patient was lifting a heavy tote and \"blew a disc\" in her back 1 week ago. Has never had an injury like this in the past.     Assessment: severe thoracic pain rated 10/10. Has tried tylenol, ibuprofen, tizanidine, and lidocaine patches without relief. Patient is tearful. Pain does not radiate anywhere. Denies bowel or bladder incontinence. Difficulty ambulating due to pain.     Protocol Recommended Disposition:   See in Office Today    Recommendation: recommended being seen today due to severe pain. No appointments in clinic. Recommended WIC, UC, or ED. Patient adamantly refused stating she does not have a ride to get anywhere and cannot walk - she just desires pain medication to be prescribed. Patient upset that RN cannot immediately prescribe medication over the phone and stated \"I'll just call back tomorrow, you're no help!\" and disconnected call.     Routed to provider    Does the patient meet one of the following criteria for ADS visit consideration? No    Reason for Disposition    SEVERE back pain (e.g., excruciating, unable to do any normal activities) and not improved after pain medicine and CARE ADVICE    Additional Information    Negative: Passed out (i.e., fainted, collapsed and was not responding)    Negative: Shock suspected (e.g., cold/pale/clammy skin, too weak to stand, low BP, rapid pulse)    Negative: Sounds like a life-threatening emergency to the triager    Negative: Major injury to the back (e.g., MVA, fall > 10 feet or 3 meters, penetrating injury, etc.)    Negative: Pain in the upper back over the ribs (rib cage) that radiates (travels) into the chest    Negative: Pain in the upper back over the ribs (rib cage) and worsened by coughing (or clearly increases with breathing)    Negative: Back pain during pregnancy    Negative: SEVERE back pain of sudden onset and age > 60 years    " Negative: SEVERE abdominal pain (e.g., excruciating)    Negative: Abdominal pain and age > 60 years    Negative: Unable to urinate (or only a few drops) and bladder feels very full    Negative: Loss of bladder or bowel control (urine or bowel incontinence; wetting self, leaking stool) of new-onset    Negative: Numbness (loss of sensation) in groin or rectal area    Negative: Pain radiates into groin, scrotum    Negative: Blood in urine (red, pink, or tea-colored)    Negative: Vomiting and pain over lower ribs of back (i.e., flank - kidney area)    Negative: Weakness of a leg or foot (e.g., unable to bear weight, dragging foot)    Negative: Patient sounds very sick or weak to the triager    Negative: Fever > 100.4 F (38.0 C) and flank pain    Negative: Pain or burning with passing urine (urination)    Protocols used: BACK PAIN-A-[place]    Eunice Bergman RN BSN  [facility]

## 2023-04-27 NOTE — TELEPHONE ENCOUNTER
General Call      Reason for Call:  Medication request     What are your questions or concerns: pt called in she stated that she was spring cleaning and tore the disk in her back as she was lifting a tote full of clothes and is now in a lot of pain. Pt is requesting a pain medication to help with the pain. Please advise.  Pt has an upcoming appt on 5/8/23  Thanks!    Date of last appointment with provider: 10/11/22    Okay to leave a detailed message?: Yes at Home number on file [phone] (home)

## 2023-04-28 NOTE — TELEPHONE ENCOUNTER
Attempted to call patient to inform her of provider's message below. LVM instructing her to return call. If patient calls back, she needs to be seen for evaluation.     She has an appointment scheduled for 5/8 for this concern but if her pain is severe and she cannot wait, please use approv req slot or refer to WIC/UC/ED.

## 2023-05-01 NOTE — TELEPHONE ENCOUNTER
RN made 2nd attempt to contact patient, but no answer. Left message on patient's voice mail to call clinic back.  She has an appointment scheduled for 5/8 for this concern but if her pain is severe and she cannot wait, please use approv req slot or refer to WIC/UC/ED.     Zee Oh RN  [facility]      Dr Gutiérrez  She will need to be seen.

## 2023-05-02 NOTE — TELEPHONE ENCOUNTER
Made attempt #3 to call patient to inform her of provider's message below. LVM instructing her to return call. If patient calls back, she needs to be seen for evaluation.      She has an appointment scheduled for 5/8 for this concern but if her pain is severe and she cannot wait, please use approv req slot or refer to WIC/UC/ED.     Per protocol, will send letter to patient's home address.

## 2023-05-22 ENCOUNTER — OFFICE VISIT (OUTPATIENT)
Dept: FAMILY MEDICINE | Facility: CLINIC | Age: 63
End: 2023-05-22
Payer: MEDICARE

## 2023-05-22 ENCOUNTER — ANCILLARY PROCEDURE (OUTPATIENT)
Dept: GENERAL RADIOLOGY | Facility: CLINIC | Age: 63
End: 2023-05-22
Attending: FAMILY MEDICINE
Payer: MEDICARE

## 2023-05-22 VITALS
HEART RATE: 90 BPM | WEIGHT: 122 LBS | RESPIRATION RATE: 18 BRPM | TEMPERATURE: 97.8 F | DIASTOLIC BLOOD PRESSURE: 72 MMHG | HEIGHT: 61 IN | BODY MASS INDEX: 23.03 KG/M2 | OXYGEN SATURATION: 96 % | SYSTOLIC BLOOD PRESSURE: 103 MMHG

## 2023-05-22 DIAGNOSIS — Z12.4 CERVICAL CANCER SCREENING: ICD-10-CM

## 2023-05-22 DIAGNOSIS — M54.50 CHRONIC LEFT-SIDED LOW BACK PAIN WITHOUT SCIATICA: ICD-10-CM

## 2023-05-22 DIAGNOSIS — Z90.710 HISTORY OF HYSTERECTOMY FOR CANCER: ICD-10-CM

## 2023-05-22 DIAGNOSIS — M41.9 SCOLIOSIS OF THORACOLUMBAR SPINE, UNSPECIFIED SCOLIOSIS TYPE: ICD-10-CM

## 2023-05-22 DIAGNOSIS — L98.9 SKIN LESION: ICD-10-CM

## 2023-05-22 DIAGNOSIS — Z12.11 SCREEN FOR COLON CANCER: ICD-10-CM

## 2023-05-22 DIAGNOSIS — R73.09 ELEVATED GLUCOSE LEVEL: ICD-10-CM

## 2023-05-22 DIAGNOSIS — E03.8 OTHER SPECIFIED HYPOTHYROIDISM: ICD-10-CM

## 2023-05-22 DIAGNOSIS — R63.4 UNINTENDED WEIGHT LOSS: Primary | ICD-10-CM

## 2023-05-22 DIAGNOSIS — R56.9 CONVULSIONS, UNSPECIFIED CONVULSION TYPE (H): ICD-10-CM

## 2023-05-22 DIAGNOSIS — M62.838 MUSCLE SPASM: ICD-10-CM

## 2023-05-22 DIAGNOSIS — Z12.31 VISIT FOR SCREENING MAMMOGRAM: ICD-10-CM

## 2023-05-22 DIAGNOSIS — R63.4 UNINTENDED WEIGHT LOSS: ICD-10-CM

## 2023-05-22 DIAGNOSIS — G89.29 CHRONIC LEFT-SIDED LOW BACK PAIN WITHOUT SCIATICA: ICD-10-CM

## 2023-05-22 LAB
ALBUMIN SERPL BCG-MCNC: 4.7 G/DL (ref 3.5–5.2)
ALP SERPL-CCNC: 93 U/L (ref 35–104)
ALT SERPL W P-5'-P-CCNC: 26 U/L (ref 10–35)
ANION GAP SERPL CALCULATED.3IONS-SCNC: 15 MMOL/L (ref 7–15)
AST SERPL W P-5'-P-CCNC: 37 U/L (ref 10–35)
BASOPHILS # BLD AUTO: 0.1 10E3/UL (ref 0–0.2)
BASOPHILS NFR BLD AUTO: 1 %
BILIRUB SERPL-MCNC: 0.2 MG/DL
BUN SERPL-MCNC: 16.6 MG/DL (ref 8–23)
CALCIUM SERPL-MCNC: 9.4 MG/DL (ref 8.8–10.2)
CHLORIDE SERPL-SCNC: 97 MMOL/L (ref 98–107)
CREAT SERPL-MCNC: 1 MG/DL (ref 0.51–0.95)
DEPRECATED HCO3 PLAS-SCNC: 25 MMOL/L (ref 22–29)
EOSINOPHIL # BLD AUTO: 0 10E3/UL (ref 0–0.7)
EOSINOPHIL NFR BLD AUTO: 0 %
ERYTHROCYTE [DISTWIDTH] IN BLOOD BY AUTOMATED COUNT: 12.3 % (ref 10–15)
GFR SERPL CREATININE-BSD FRML MDRD: 63 ML/MIN/1.73M2
GLUCOSE SERPL-MCNC: 112 MG/DL (ref 70–99)
HBA1C MFR BLD: 5.8 % (ref 0–5.6)
HCT VFR BLD AUTO: 44.5 % (ref 35–47)
HGB BLD-MCNC: 14.6 G/DL (ref 11.7–15.7)
IMM GRANULOCYTES # BLD: 0 10E3/UL
IMM GRANULOCYTES NFR BLD: 0 %
LYMPHOCYTES # BLD AUTO: 1.5 10E3/UL (ref 0.8–5.3)
LYMPHOCYTES NFR BLD AUTO: 22 %
MCH RBC QN AUTO: 33 PG (ref 26.5–33)
MCHC RBC AUTO-ENTMCNC: 32.8 G/DL (ref 31.5–36.5)
MCV RBC AUTO: 101 FL (ref 78–100)
MONOCYTES # BLD AUTO: 0.6 10E3/UL (ref 0–1.3)
MONOCYTES NFR BLD AUTO: 9 %
NEUTROPHILS # BLD AUTO: 4.5 10E3/UL (ref 1.6–8.3)
NEUTROPHILS NFR BLD AUTO: 67 %
PLATELET # BLD AUTO: 141 10E3/UL (ref 150–450)
POTASSIUM SERPL-SCNC: 4.1 MMOL/L (ref 3.4–5.3)
PROT SERPL-MCNC: 7.8 G/DL (ref 6.4–8.3)
RBC # BLD AUTO: 4.43 10E6/UL (ref 3.8–5.2)
SODIUM SERPL-SCNC: 137 MMOL/L (ref 136–145)
TSH SERPL DL<=0.005 MIU/L-ACNC: 2.68 UIU/ML (ref 0.3–4.2)
WBC # BLD AUTO: 6.6 10E3/UL (ref 4–11)

## 2023-05-22 PROCEDURE — 83036 HEMOGLOBIN GLYCOSYLATED A1C: CPT | Performed by: FAMILY MEDICINE

## 2023-05-22 PROCEDURE — 72070 X-RAY EXAM THORAC SPINE 2VWS: CPT | Mod: TC | Performed by: RADIOLOGY

## 2023-05-22 PROCEDURE — 71046 X-RAY EXAM CHEST 2 VIEWS: CPT | Mod: TC | Performed by: RADIOLOGY

## 2023-05-22 PROCEDURE — 36415 COLL VENOUS BLD VENIPUNCTURE: CPT | Performed by: FAMILY MEDICINE

## 2023-05-22 PROCEDURE — 72100 X-RAY EXAM L-S SPINE 2/3 VWS: CPT | Mod: TC | Performed by: RADIOLOGY

## 2023-05-22 PROCEDURE — 80050 GENERAL HEALTH PANEL: CPT | Performed by: FAMILY MEDICINE

## 2023-05-22 PROCEDURE — 99214 OFFICE O/P EST MOD 30 MIN: CPT | Performed by: FAMILY MEDICINE

## 2023-05-22 RX ORDER — LIDOCAINE 50 MG/G
1 PATCH TOPICAL EVERY 24 HOURS
Qty: 30 PATCH | Refills: 1 | Status: SHIPPED | OUTPATIENT
Start: 2023-05-22 | End: 2023-11-10

## 2023-05-22 ASSESSMENT — ENCOUNTER SYMPTOMS: BACK PAIN: 1

## 2023-05-22 NOTE — LETTER
"May 22, 2023      Kerry Reed  [address] [place] [address]        Dear ,    We are writing to inform you of your test results.    Your labs are reassuring.  Nothing that stands out.   The A1c is 5.8 - this represents \"prediabetes\".  You do not need to do anything except watch the diet.      Resulted Orders   TSH WITH FREE T4 REFLEX   Result Value Ref Range    TSH 2.68 0.30 - 4.20 uIU/mL   Comprehensive metabolic panel (BMP + Alb, Alk Phos, ALT, AST, Total. Bili, TP)   Result Value Ref Range    Sodium 137 136 - 145 mmol/L    Potassium 4.1 3.4 - 5.3 mmol/L    Chloride 97 (L) 98 - 107 mmol/L    Carbon Dioxide (CO2) 25 22 - 29 mmol/L    Anion Gap 15 7 - 15 mmol/L    Urea Nitrogen 16.6 8.0 - 23.0 mg/dL    Creatinine 1.00 (H) 0.51 - 0.95 mg/dL    Calcium 9.4 8.8 - 10.2 mg/dL    Glucose 112 (H) 70 - 99 mg/dL    Alkaline Phosphatase 93 35 - 104 U/L    AST 37 (H) 10 - 35 U/L    ALT 26 10 - 35 U/L    Protein Total 7.8 6.4 - 8.3 g/dL    Albumin 4.7 3.5 - 5.2 g/dL    Bilirubin Total 0.2 <=1.2 mg/dL    GFR Estimate 63 >60 mL/min/1.73m2      Comment:      eGFR calculated using 2021 CKD-EPI equation.   Hemoglobin A1c   Result Value Ref Range    Hemoglobin A1C 5.8 (H) 0.0 - 5.6 %      Comment:      Normal <5.7%   Prediabetes 5.7-6.4%    Diabetes 6.5% or higher     Note: Adopted from ADA consensus guidelines.   CBC with platelets and differential   Result Value Ref Range    WBC Count 6.6 4.0 - 11.0 10e3/uL    RBC Count 4.43 3.80 - 5.20 10e6/uL    Hemoglobin 14.6 11.7 - 15.7 g/dL    Hematocrit 44.5 35.0 - 47.0 %     (H) 78 - 100 fL    MCH 33.0 26.5 - 33.0 pg    MCHC 32.8 31.5 - 36.5 g/dL    RDW 12.3 10.0 - 15.0 %    Platelet Count 141 (L) 150 - 450 10e3/uL    % Neutrophils 67 %    % Lymphocytes 22 %    % Monocytes 9 %    % Eosinophils 0 %    % Basophils 1 %    % Immature Granulocytes 0 %    Absolute Neutrophils 4.5 1.6 - 8.3 10e3/uL    Absolute Lymphocytes 1.5 0.8 - 5.3 10e3/uL    Absolute Monocytes " 0.6 0.0 - 1.3 10e3/uL    Absolute Eosinophils 0.0 0.0 - 0.7 10e3/uL    Absolute Basophils 0.1 0.0 - 0.2 10e3/uL    Absolute Immature Granulocytes 0.0 <=0.4 10e3/uL       If you have any questions or concerns, please call the clinic at the number listed above.       Sincerely,      Ada Espinoza MD

## 2023-05-22 NOTE — PROGRESS NOTES
"1. Unintended weight loss  This is a 61 yo female with unintended weight loss - she has not changed diet or exercise; not taking any new medications - will check metabolic labs, and chest xray (smoking history).    - XR Chest 2 Views; Future  - Comprehensive metabolic panel (BMP + Alb, Alk Phos, ALT, AST, Total. Bili, TP); Future  - CBC with Platelets & Differential; Future  - Comprehensive metabolic panel (BMP + Alb, Alk Phos, ALT, AST, Total. Bili, TP)  - CBC with Platelets & Differential    2. Muscle spasm  Patient complains of now chronic back pain related to ?lifting boxes.  Will try Tizanidine for pain/muscle spasm .  No neurologic red flags on history/exam.   - tiZANidine (ZANAFLEX) 4 MG tablet; TAKE 1 TABLET(4 MG) BY MOUTH EVERY NIGHT AS NEEDED FOR MUSCLE SPASMS  Dispense: 30 tablet; Refill: 0    3. Other specified hypothyroidism  H/o hypothyroidism - check labs - could contribute to weight loss if this is \"off\"  - TSH WITH FREE T4 REFLEX; Future  - TSH WITH FREE T4 REFLEX    4. Convulsions, unspecified convulsion type (H)  H/o convulsions - recent history is difficult to obtain -     5. History of hysterectomy for cancer  Patient has had previous hysterectomy for cancer - discussed need for pap smear into future    6. Scoliosis of thoracolumbar spine, unspecified scoliosis type  H/o scoliosis of spine - reviewed xrays today - this likely contributes to her back pain syndrome - I think she should see a spine specialist  - XR Lumbar Spine 2/3 Views; Future  - Spine  Referral; Future  - XR Thoracic Spine 2 Views; Future  - lidocaine (LIDODERM) 5 % patch; Place 1 patch onto the skin every 24 hours To prevent lidocaine toxicity, patient should be patch free for 12 hrs daily.  Dispense: 30 patch; Refill: 1    7. Visit for screening mammogram  Due for breast cancer screening - discussed - mammogram ordered   - MA SCREENING DIGITAL BILAT - Future  (s+30); Future    8. Screen for colon cancer  Due for colon " "cancer screening - declines     9. Cervical cancer screening  Due for cervix cancer screening - declines    10. Chronic left-sided low back pain without sciatica  Chronic left sided low back pain with significant scoliosis - will try Lidocaine patch; will refer to [facility]   - Spine  Referral; Future  - lidocaine (LIDODERM) 5 % patch; Place 1 patch onto the skin every 24 hours To prevent lidocaine toxicity, patient should be patch free for 12 hrs daily.  Dispense: 30 patch; Refill: 1    11. Elevated glucose level  Elevated random glucose level on screening - will check A1c  - Hemoglobin A1c; Future  - Hemoglobin A1c    12. Skin lesion  Patient has a skin lesion - needs Derm evaluation (likely skin cancer)  - Adult Dermatology Referral; Future      Arley Payne is a 62 year old, presenting for the following health issues:  Back Pain (Left-mid back pain from trying to move some clothes x1 month ago)        5/22/2023    10:24 AM   Additional Questions   Roomed by Vonda   Accompanied by Sister     Was trying to lift something and \"whacked her back\"  Has h/o scoliosis  Takes probiotics , fiber  No blood in stools  No nausea/vomiting      vaping - using marijuana  Not using cigarettes    No seizures since before COVID  Has been living with sister x 4 years -         Back Pain   Pertinent negatives include no chest pain, no fever and no abdominal pain.   History of Present Illness       Back Pain:  She presents for follow up of back pain. Patient's back pain is a recurring problem.  Location of back pain:  Left middle of back  Description of back pain: sharp  Back pain spreads: nowhere    Since patient first noticed back pain, pain is: rapidly worsening  Does back pain interfere with her job:  No      She eats 0-1 servings of fruits and vegetables daily.She consumes 1 sweetened beverage(s) daily.She exercises with enough effort to increase her heart rate 9 or less minutes per day.  She exercises with " "enough effort to increase her heart rate 3 or less days per week.   She is taking medications regularly.               Review of Systems   Constitutional: Positive for unexpected weight change. Negative for chills, fatigue and fever.   HENT: Negative.    Eyes: Negative for visual disturbance.   Respiratory: Negative for cough and shortness of breath.    Cardiovascular: Negative for chest pain and peripheral edema.   Gastrointestinal: Negative.  Negative for abdominal pain.   Endocrine: Negative for polydipsia and polyuria.   Genitourinary: Negative.  Negative for vaginal bleeding.   Musculoskeletal: Positive for back pain.   Skin:        Skin lesion   Neurological: Positive for seizures.   Hematological: Does not bruise/bleed easily.   Psychiatric/Behavioral: The patient is nervous/anxious.    All other systems reviewed and are negative.           Objective    /72 (BP Location: Right arm, Patient Position: Sitting, Cuff Size: Adult Regular)   Pulse 90   Temp 97.8  F (36.6  C) (Temporal)   Resp 18   Ht 1.537 m (5' 0.5\")   Wt 55.3 kg (122 lb)   LMP  (LMP Unknown)   SpO2 96%   BMI 23.43 kg/m    Body mass index is 23.43 kg/m .  Physical Exam  Vitals reviewed.   Constitutional:       General: She is not in acute distress.     Appearance: Normal appearance.   HENT:      Head: Normocephalic.      Right Ear: Tympanic membrane, ear canal and external ear normal.      Left Ear: Tympanic membrane, ear canal and external ear normal.      Nose: Nose normal.      Mouth/Throat:      Mouth: Mucous membranes are moist.      Pharynx: No posterior oropharyngeal erythema.   Eyes:      Extraocular Movements: Extraocular movements intact.      Conjunctiva/sclera: Conjunctivae normal.      Pupils: Pupils are equal, round, and reactive to light.   Cardiovascular:      Rate and Rhythm: Normal rate and regular rhythm.      Pulses: Normal pulses.      Heart sounds: Normal heart sounds. No murmur heard.  Pulmonary:      Effort: " Pulmonary effort is normal.      Breath sounds: Normal breath sounds.   Abdominal:      Palpations: Abdomen is soft. There is no mass.      Tenderness: There is no abdominal tenderness. There is no guarding or rebound.   Musculoskeletal:         General: No deformity. Normal range of motion.      Cervical back: Normal range of motion and neck supple.      Comments: Severely flat feet  Significant scoliotic curvature   Lymphadenopathy:      Cervical: No cervical adenopathy.   Skin:     General: Skin is warm and dry.      Findings: Lesion present.   Neurological:      General: No focal deficit present.      Mental Status: She is alert.   Psychiatric:         Mood and Affect: Mood normal.            Results for orders placed or performed in visit on 05/22/23   XR Thoracic Spine 2 Views     Status: None    Narrative    EXAM: XR THORACIC SPINE 2 VIEWS, XR LUMBAR SPINE 2/3 VIEWS  LOCATION: [facility]  DATE/TIME: 5/22/2023 11:32 AM CDT    INDICATION: Scoliosis of thoracolumbar spine, unspecified scoliosis type.  COMPARISON: None.      Impression    IMPRESSION:   THORACIC SPINE: Broad rightward curvature to the thoracic spine. Exaggerated thoracic kyphosis. Partially visualized levoscoliotic curvature at the lower thoracic and upper lumbar spine. Apparent height loss at the thoracolumbar junction on the lateral   view may relate to this curvature rather than genuine height loss, as there is no definite vertebral body height loss on the frontal view. Diffuse osteopenia. Multilevel mild degenerative disc disease. Surgical clips in the right upper quadrant.    LUMBAR SPINE: Five non-rib-bearing lumbar type vertebrae. Levoscoliotic curvature to the lower thoracic and upper lumbar spine with apex at L1-L2. Exaggerated lumbar lordosis. Minimal retrolisthesis of L1 on L2. Mild anterior wedging of the L2 vertebral   body is age indeterminate, favored to be chronic in the absence of pain on palpation.  Moderate T12-L1 and severe L1-L2 degenerative disc disease. Moderate L2-L3 and L3-L4 degenerative disc disease with multilevel moderate facet arthropathy. Surgical   clips and vascular stent incidentally noted. Degenerative change at the bilateral sacroiliac joints.   Results for orders placed or performed in visit on 05/22/23   XR Lumbar Spine 2/3 Views     Status: None    Narrative    EXAM: XR THORACIC SPINE 2 VIEWS, XR LUMBAR SPINE 2/3 VIEWS  LOCATION: [facility]  DATE/TIME: 5/22/2023 11:32 AM CDT    INDICATION: Scoliosis of thoracolumbar spine, unspecified scoliosis type.  COMPARISON: None.      Impression    IMPRESSION:   THORACIC SPINE: Broad rightward curvature to the thoracic spine. Exaggerated thoracic kyphosis. Partially visualized levoscoliotic curvature at the lower thoracic and upper lumbar spine. Apparent height loss at the thoracolumbar junction on the lateral   view may relate to this curvature rather than genuine height loss, as there is no definite vertebral body height loss on the frontal view. Diffuse osteopenia. Multilevel mild degenerative disc disease. Surgical clips in the right upper quadrant.    LUMBAR SPINE: Five non-rib-bearing lumbar type vertebrae. Levoscoliotic curvature to the lower thoracic and upper lumbar spine with apex at L1-L2. Exaggerated lumbar lordosis. Minimal retrolisthesis of L1 on L2. Mild anterior wedging of the L2 vertebral   body is age indeterminate, favored to be chronic in the absence of pain on palpation. Moderate T12-L1 and severe L1-L2 degenerative disc disease. Moderate L2-L3 and L3-L4 degenerative disc disease with multilevel moderate facet arthropathy. Surgical   clips and vascular stent incidentally noted. Degenerative change at the bilateral sacroiliac joints.   Results for orders placed or performed in visit on 05/22/23   XR Chest 2 Views     Status: None    Narrative    EXAM: XR CHEST 2 VIEWS  LOCATION: [facility]  DATE/TIME: 5/22/2023 11:21 AM CDT    INDICATION:  Unintended weight loss  COMPARISON: None.      Impression    IMPRESSION:     Cardiac silhouette is normal in size. Mediastinal borders are well-defined. Hilar contours and lung vascularity are normal.    Symmetric lung inflation. No airspace opacities or interstitial thickening.    Diaphragmatic curvature is preserved. No pleural effusion.    Leftward curvature of the lower thoracic and upper lumbar spine centered around L1-L2.    There are multiple abdominal surgical clips.   Results for orders placed or performed in visit on 05/22/23   TSH WITH FREE T4 REFLEX     Status: Normal   Result Value Ref Range    TSH 2.68 0.30 - 4.20 uIU/mL   Comprehensive metabolic panel (BMP + Alb, Alk Phos, ALT, AST, Total. Bili, TP)     Status: Abnormal   Result Value Ref Range    Sodium 137 136 - 145 mmol/L    Potassium 4.1 3.4 - 5.3 mmol/L    Chloride 97 (L) 98 - 107 mmol/L    Carbon Dioxide (CO2) 25 22 - 29 mmol/L    Anion Gap 15 7 - 15 mmol/L    Urea Nitrogen 16.6 8.0 - 23.0 mg/dL    Creatinine 1.00 (H) 0.51 - 0.95 mg/dL    Calcium 9.4 8.8 - 10.2 mg/dL    Glucose 112 (H) 70 - 99 mg/dL    Alkaline Phosphatase 93 35 - 104 U/L    AST 37 (H) 10 - 35 U/L    ALT 26 10 - 35 U/L    Protein Total 7.8 6.4 - 8.3 g/dL    Albumin 4.7 3.5 - 5.2 g/dL    Bilirubin Total 0.2 <=1.2 mg/dL    GFR Estimate 63 >60 mL/min/1.73m2   Hemoglobin A1c     Status: Abnormal   Result Value Ref Range    Hemoglobin A1C 5.8 (H) 0.0 - 5.6 %   CBC with platelets and differential     Status: Abnormal   Result Value Ref Range    WBC Count 6.6 4.0 - 11.0 10e3/uL    RBC Count 4.43 3.80 - 5.20 10e6/uL    Hemoglobin 14.6 11.7 - 15.7 g/dL    Hematocrit 44.5 35.0 - 47.0 %     (H) 78 - 100 fL    MCH 33.0 26.5 - 33.0 pg    MCHC 32.8 31.5 - 36.5 g/dL    RDW 12.3 10.0 - 15.0 %    Platelet Count 141 (L) 150 - 450 10e3/uL    % Neutrophils 67 %    % Lymphocytes 22 %    % Monocytes 9 %    % Eosinophils 0 %    %  Basophils 1 %    % Immature Granulocytes 0 %    Absolute Neutrophils 4.5 1.6 - 8.3 10e3/uL    Absolute Lymphocytes 1.5 0.8 - 5.3 10e3/uL    Absolute Monocytes 0.6 0.0 - 1.3 10e3/uL    Absolute Eosinophils 0.0 0.0 - 0.7 10e3/uL    Absolute Basophils 0.1 0.0 - 0.2 10e3/uL    Absolute Immature Granulocytes 0.0 <=0.4 10e3/uL   CBC with Platelets & Differential     Status: Abnormal    Narrative    The following orders were created for panel order CBC with Platelets & Differential.  Procedure                               Abnormality         Status                     ---------                               -----------         ------                     CBC with platelets and d...[[id number]]  Abnormal            Final result                 Please view results for these tests on the individual orders.               Prior to immunization administration, verified patients identity using patient s name and date of birth. Please see Immunization Activity for additional information.     Screening Questionnaire for Adult Immunization    Are you sick today?   No   Do you have allergies to medications, food, a vaccine component or latex?   Yes   Have you ever had a serious reaction after receiving a vaccination?   No   Do you have a long-term health problem with heart, lung, kidney, or metabolic disease (e.g., diabetes), asthma, a blood disorder, no spleen, complement component deficiency, a cochlear implant, or a spinal fluid leak?  Are you on long-term aspirin therapy?   Yes   Do you have cancer, leukemia, HIV/AIDS, or any other immune system problem?   Yes   Do you have a parent, brother, or sister with an immune system problem?   Don't Know   In the past 3 months, have you taken medications that affect  your immune system, such as prednisone, other steroids, or anticancer drugs; drugs for the treatment of rheumatoid arthritis, Crohn s disease, or psoriasis; or have you had radiation treatments?   No   Have you had a seizure,  or a brain or other nervous system problem?   Yes   During the past year, have you received a transfusion of blood or blood    products, or been given immune (gamma) globulin or antiviral drug?   No   For women: Are you pregnant or is there a chance you could become       pregnant during the next month?   No   Have you received any vaccinations in the past 4 weeks?   No     Immunization questionnaire was positive for at least one answer.  Notified Dr. Gutiérrez.        Screening performed by Vonda Villanueva CMA on 5/22/2023 at 10:28 AM.

## 2023-05-29 ASSESSMENT — ENCOUNTER SYMPTOMS
POLYDIPSIA: 0
COUGH: 0
BRUISES/BLEEDS EASILY: 0
FEVER: 0
NERVOUS/ANXIOUS: 1
SHORTNESS OF BREATH: 0
ROS SKIN COMMENTS: SKIN LESION
SEIZURES: 1
GASTROINTESTINAL NEGATIVE: 1
FATIGUE: 0
ABDOMINAL PAIN: 0
UNEXPECTED WEIGHT CHANGE: 1
CHILLS: 0

## 2023-06-24 DIAGNOSIS — M62.838 MUSCLE SPASM: ICD-10-CM

## 2023-06-25 NOTE — TELEPHONE ENCOUNTER
Routing refill request to provider for review/approval because:  Drug not on the [facility] refill protocol     Last Written Prescription Date:  5/22/2023  Last Fill Quantity: 30,  # refills: 0   Last office visit provider:  5/22/2023     Requested Prescriptions   Pending Prescriptions Disp Refills     tiZANidine (ZANAFLEX) 4 MG tablet [Pharmacy Med Name: TIZANIDINE 4MG TABLETS] 30 tablet 0     Sig: TAKE 1 TABLET(4 MG) BY MOUTH EVERY NIGHT AS NEEDED FOR MUSCLE SPASMS       There is no refill protocol information for this order          Elisa Martinez RN 06/24/23 10:28 PM

## 2023-08-14 DIAGNOSIS — E03.8 OTHER SPECIFIED HYPOTHYROIDISM: ICD-10-CM

## 2023-08-14 DIAGNOSIS — M62.838 MUSCLE SPASM: ICD-10-CM

## 2023-08-14 NOTE — TELEPHONE ENCOUNTER
Medication Question or Refill        What medication are you calling about (include dose and sig)?: tiZANidine (ZANAFLEX) 4 MG tablet     SYNTHROID 50 MCG table       Preferred Pharmacy:  [id number] - [place], [place] - [address] [place] [address] AT [facility] [address]  [address] [place] [address]  Phone: [phone] Fax: [phone]      Controlled Substance Agreement on file:   CSA -- Patient Level:    CSA: None found at the patient level.       Who prescribed the medication?: PCP    Do you need a refill? Yes    When did you use the medication last? N/A    Patient offered an appointment? No    Do you have any questions or concerns?  No      Okay to leave a detailed message?: Yes at Home number on file [phone] (home)

## 2023-08-15 RX ORDER — LEVOTHYROXINE SODIUM 50 MCG
TABLET ORAL
Qty: 90 TABLET | Refills: 1 | Status: SHIPPED | OUTPATIENT
Start: 2023-08-15 | End: 2024-02-28

## 2023-08-16 ENCOUNTER — TELEPHONE (OUTPATIENT)
Dept: FAMILY MEDICINE | Facility: CLINIC | Age: 63
End: 2023-08-16
Payer: MEDICARE

## 2023-08-16 DIAGNOSIS — M62.838 MUSCLE SPASM: ICD-10-CM

## 2023-08-16 NOTE — TELEPHONE ENCOUNTER
Central Prior Authorization Team   Phone: [phone]      PRIOR AUTHORIZATION DENIED    Medication: TIZANIDINE HCL 4 MG PO TABS  Insurance Company: [employer] - Phone [phone] Fax [phone]  Denial Date: 8/16/2023  Denial Rational:   Denied   8/16/2023  9:06 AM  Case ID: [id number]   Note from payer: The drug you asked for is not listed in your preferred drug list (formulary). The preferred drug(s), you may not have tried, are: baclofen tablet. Your provider needs to give us medical reasons why the preferred drug(s) would not work for you and/or would have bad side effects. Sometimes a preferred drug needs more review for approval. Additionally, some preferred drugs listed may be the same drugs with different strengths or forms. [employer] may only require one strength or form of that drug to be tried. This decision was from [employer] Non-Formulary Exceptions Coverage Policy.     Appeal Information:       Patient Notified: No Please note: Providers/Clinics are to notify the patients of the denial outcomes and steps going forward.

## 2023-08-16 NOTE — TELEPHONE ENCOUNTER
Routing refill request to provider for review/approval because:  Drug not on the [facility] refill protocol   Prior authorization: Denied     Last Written Prescription Date:  8/15/23  Last Fill Quantity:30 ,  # refills: 0   Last office visit provider:  5/22/23     Requested Prescriptions   Pending Prescriptions Disp Refills    tiZANidine (ZANAFLEX) 4 MG tablet [Pharmacy Med Name: TIZANIDINE 4MG TABLETS] 30 tablet 0     Sig: TAKE 1 TABLET(4 MG) BY MOUTH EVERY NIGHT AS NEEDED FOR MUSCLE SPASMS       There is no refill protocol information for this order          Mady Levin RN 08/16/23 12:30 PM

## 2023-08-18 ENCOUNTER — TELEPHONE (OUTPATIENT)
Dept: FAMILY MEDICINE | Facility: CLINIC | Age: 63
End: 2023-08-18
Payer: MEDICARE

## 2023-08-18 DIAGNOSIS — G40.A09 NONINTRACTABLE ABSENCE EPILEPSY WITHOUT STATUS EPILEPTICUS (H): ICD-10-CM

## 2023-08-18 DIAGNOSIS — Z76.0 ENCOUNTER FOR MEDICATION REFILL: ICD-10-CM

## 2023-08-18 DIAGNOSIS — R06.02 SHORTNESS OF BREATH: ICD-10-CM

## 2023-08-18 RX ORDER — ALBUTEROL SULFATE 90 UG/1
2 AEROSOL, METERED RESPIRATORY (INHALATION) EVERY 4 HOURS PRN
Qty: 8.5 G | Refills: 5 | Status: SHIPPED | OUTPATIENT
Start: 2023-08-18 | End: 2023-10-02

## 2023-08-18 RX ORDER — LEVETIRACETAM 500 MG
1000 TABLET, EXTENDED RELEASE 24 HR ORAL 2 TIMES DAILY
Qty: 180 TABLET | Refills: 3 | Status: SHIPPED | OUTPATIENT
Start: 2023-08-18 | End: 2023-11-10

## 2023-08-18 NOTE — TELEPHONE ENCOUNTER
Medication Question or Refill    Contacts         Type Contact Phone/Fax    08/18/2023 12:08 PM CDT Phone (Incoming) TongpérezKerry red (Self) [phone] (M)            What medication are you calling about (include dose and sig)?: KEPPRA  MG 24 hr tablet  and albuterol (PROAIR HFA/PROVENTIL HFA/VENTOLIN HFA) 108 (90 Base) MCG/ACT inhaler     Preferred Pharmacy:  [id number] - [place], [place] - [address] [place] [address] AT [facility] [address]  [address] [place] [address]  Phone: [phone] Fax: [phone]      Controlled Substance Agreement on file:   CSA -- Patient Level:    CSA: None found at the patient level.       Who prescribed the medication?: Dr Gutiérrez    Do you need a refill? Yes    When did you use the medication last? today    Patient offered an appointment? No    Do you have any questions or concerns?  Yes: due to insurance, need generic brand of Keppra and inhaler       Okay to leave a detailed message?: Yes at Cell number on file:    Telephone Information:   Mobile [phone]

## 2023-08-21 ENCOUNTER — TELEPHONE (OUTPATIENT)
Dept: FAMILY MEDICINE | Facility: CLINIC | Age: 63
End: 2023-08-21
Payer: MEDICARE

## 2023-08-21 NOTE — LETTER
8/30/2023     Kerry Reed   1960  [address] [place] [facility] [address]       To Whom It May Concern:    Due to insurance denial of our request for brand name Keppra XR , this patient filled an interim prescription for generic Levetiracetam ER (covered by insurance).  She had not been able to tolerate the generic form in the past as she actually had more seizures when taking the generic form.  She has been very stable on the Keppra XR.  After switching back to the generic form last week, this patient had a grand mal seizure.       I'm not sure how often she needs to suffer grand mal seizures due to denied access to the drug that prevents them.  But, please give another consideration to allowing this patient to fill her prescription for Keppra XR.  The branded drug has kept her stable and prevented these grand mal seizures.  It is inhumane to only allow access to a medication that has repeatedly caused an increase in her grand mal seizures.      If you have further questions, please do not hesitate to contact me.     Sincerely,        CONNOR TIWARI MD

## 2023-08-21 NOTE — CONFIDENTIAL NOTE
The pt's Keppra XR medication is not covered by insurance, they are asking for one of the below covered alternatives to be sent in:    -Levetiracetam ER  -Levetiracetam  -Lamotrigine  -Gabapentin  -Divalproex Sodium

## 2023-08-24 ENCOUNTER — TELEPHONE (OUTPATIENT)
Dept: FAMILY MEDICINE | Facility: CLINIC | Age: 63
End: 2023-08-24
Payer: MEDICARE

## 2023-08-24 NOTE — TELEPHONE ENCOUNTER
Central Prior Authorization Team   Phone: [phone]    Per insurance - there is a denial on file. The request had not been submitted by the PA team therefore the appeal would need to be handled by the care team.

## 2023-08-24 NOTE — TELEPHONE ENCOUNTER
Patient called to check status of Keppra medication as patient will be out soon.  Per chart review, medication had been sent to the pharmacy on file and verified this with patient.  No further action needed.    SOLIS PayneN, RN  [facility]

## 2023-08-25 RX ORDER — LEVETIRACETAM 500 MG/1
1000 TABLET, FILM COATED, EXTENDED RELEASE ORAL 2 TIMES DAILY
Qty: 120 TABLET | Refills: 0 | Status: SHIPPED | OUTPATIENT
Start: 2023-08-25 | End: 2023-08-31

## 2023-08-25 NOTE — TELEPHONE ENCOUNTER
Patient would like to appeal - please write letter of medical necessity for the patient's tizanidine.     Once that has been completed, please route back to me: Azeb Roy

## 2023-08-25 NOTE — TELEPHONE ENCOUNTER
Central Prior Authorization Team   Phone: [phone]      Received patient transferred call - She will be out Monday of her Brand KEPPRA  MG 24 hr tablet  (we will need to send an appeal as the patient blistered with the generic), we also will need to work on an appeal for the her tizanidine.    She does want a New Rx for the generic product sent to the pharmacy so she is not without medication and does not end up in the hospital.     Please send Rx to the pharmacy [facility].    Please call patient with additional questions [phone].

## 2023-08-25 NOTE — TELEPHONE ENCOUNTER
Called patient - wants rx for generic Keppra despite the fact that she tells me she had grand mal seizures the last time she took this.  Her insurance has denied her branded Keppra.  Unfortunately, she is now going to run out of her branded medication - we are working on appeal.  Will continue to monitor.

## 2023-08-28 ENCOUNTER — TELEPHONE (OUTPATIENT)
Dept: FAMILY MEDICINE | Facility: CLINIC | Age: 63
End: 2023-08-28
Payer: MEDICARE

## 2023-08-28 NOTE — TELEPHONE ENCOUNTER
----- Message from Ada Espinoza MD sent at 8/25/2023  3:29 PM CDT -----  Regarding: appeal letter for medication  I have written and printed a letter to appeal her Keppra medication.  Please send this to the appropriate destination .

## 2023-08-28 NOTE — TELEPHONE ENCOUNTER
Central Prior Authorization Team   Phone: [phone]      Even though it states that the days supply submitted for the appeal was denied, the medication was approved until 12/31/2023 for a 30 day supply per the plan limits.  I called the [facility] and it is now rejecting for refill too soon and can fill tomorrow on the 08/29/23 - the pharmacy did set the medication to automatically to fill that date and will get it ready for the patient.

## 2023-08-28 NOTE — TELEPHONE ENCOUNTER
"Symptoms    Describe your symptoms: Patient took generic Keppra and had a Grand Mal seizure on 8/26/23. Patient declined triage and stated \"that she is fine now.\"    How long have you been having symptoms:   1 day     Have you been seen for this:  No    Appointment offered?: Yes: Patient declined     Triage offered?: Yes: patient declined    Faxed appeal letter to [employer] for approval of Brand name Keppra  Fax # [phone]    Message routed to Dr Gutiérrez for review / plan.    Preferred Pharmacy:   [id number] - [place], [place] - [address]  [address] [place] [address]  Phone: [phone] Fax: [phone]      Okay to leave a detailed message?: No at Cell number on file:    Telephone Information:   Mobile [phone]     Zee Oh RN  [facility]        "

## 2023-08-28 NOTE — TELEPHONE ENCOUNTER
Central Prior Authorization Team   Phone: [phone]      MEDICATION APPEAL APPROVED    Medication: TIZANIDINE HCL 4 MG PO TABS  Authorization Effective Date: 1/1/2023  Authorization Expiration Date: 12/31/2023  Approved Dose/Quantity: 30 tablets per 30 days  Reference #:     [employer] Insurance Company:   Expected CoPay:       CoPay Card Available: No  Financial Assistance Needed: N/A  Which Pharmacy is filling the prescription: [id number] - [place], [place] - [address] [place] [address] [address]  Patient Notified: Per the pharmacy - Now it is rejecting for Refill too soon - the pharmacy will set it up to fill on the next fillable date per insurance, which is about a week.

## 2023-08-29 NOTE — TELEPHONE ENCOUNTER
Pt returned call regarding status of rx. Pt stated she does not want the generic Keppra but the branded one. Pt stated she does not like the way the generic keppra makes her feel. Per chart review, informed pt that rx should be ready to  today. Advised pt to call pharmacy to make sure rx is keppra and not the generic one. Pt verbalized understanding and stated she will call her pharmacy.      Cecil Kenney, BSN RN  [facility]

## 2023-08-30 DIAGNOSIS — G40.A09 NONINTRACTABLE ABSENCE EPILEPSY WITHOUT STATUS EPILEPTICUS (H): ICD-10-CM

## 2023-08-30 NOTE — TELEPHONE ENCOUNTER
Call to pt to relay provider message re: [place] Letter for Keppra. Informed pt that PCP sent another letter to insurance for approval of brand name Keppra 8/30/2023.    Pt verbalized understanding. No other questions or concerns         Pepper SON RN  [facility]

## 2023-08-30 NOTE — TELEPHONE ENCOUNTER
Patient calling again. Patient stated that she spoke with the pharmacy and they did not have medication in and will be ordering it. Patient states that she will be on the generic brand one more day as she hopes to get the name brand medication in from the pharmacy soon. Advised patient to check with pharmacy to make sure that her medication was ready.

## 2023-08-31 RX ORDER — LEVETIRACETAM 500 MG/1
1000 TABLET, FILM COATED, EXTENDED RELEASE ORAL 2 TIMES DAILY
Qty: 120 TABLET | Refills: 0 | Status: SHIPPED | OUTPATIENT
Start: 2023-08-31 | End: 2023-10-02 | Stop reason: ALTCHOICE

## 2023-08-31 NOTE — TELEPHONE ENCOUNTER
Routing refill request to provider for review/approval because:  Drug not on the [facility] refill protocol     Last Written Prescription Date:  8/25/2023  Last Fill Quantity: 120,  # refills: 0   Last office visit provider:  5/22/2023     Requested Prescriptions   Pending Prescriptions Disp Refills    levETIRAcetam (KEPPRA XR) 500 MG 24 hr tablet [Pharmacy Med Name: LEVETIRACETAM ER 500MG TABLETS] 120 tablet 0     Sig: TAKE 2 TABLETS BY MOUTH TWICE DAILY       There is no refill protocol information for this order          Elisa Martinez RN 08/30/23 8:59 PM

## 2023-09-14 DIAGNOSIS — M62.838 MUSCLE SPASM: ICD-10-CM

## 2023-09-27 ENCOUNTER — TELEPHONE (OUTPATIENT)
Dept: FAMILY MEDICINE | Facility: CLINIC | Age: 63
End: 2023-09-27
Payer: MEDICARE

## 2023-09-27 NOTE — TELEPHONE ENCOUNTER
Patient Quality Outreach    Patient is due for the following:   Breast Cancer Screening - Mammogram    Next Steps:   Schedule a Adult Preventative    Type of outreach:    Phone, left message for patient/parent to call back.      Questions for provider review:    None           Andrea Behrend  Chart routed to Provider.

## 2023-10-02 ENCOUNTER — OFFICE VISIT (OUTPATIENT)
Dept: FAMILY MEDICINE | Facility: CLINIC | Age: 63
End: 2023-10-02
Payer: MEDICARE

## 2023-10-02 VITALS
TEMPERATURE: 98 F | WEIGHT: 122 LBS | BODY MASS INDEX: 23.95 KG/M2 | OXYGEN SATURATION: 99 % | HEIGHT: 60 IN | HEART RATE: 81 BPM | DIASTOLIC BLOOD PRESSURE: 75 MMHG | RESPIRATION RATE: 20 BRPM | SYSTOLIC BLOOD PRESSURE: 118 MMHG

## 2023-10-02 DIAGNOSIS — Z76.0 ENCOUNTER FOR MEDICATION REFILL: ICD-10-CM

## 2023-10-02 DIAGNOSIS — Z23 NEED FOR IMMUNIZATION AGAINST INFLUENZA: ICD-10-CM

## 2023-10-02 DIAGNOSIS — Z23 NEED FOR COVID-19 VACCINE: ICD-10-CM

## 2023-10-02 DIAGNOSIS — M41.9 SCOLIOSIS OF THORACOLUMBAR SPINE, UNSPECIFIED SCOLIOSIS TYPE: ICD-10-CM

## 2023-10-02 DIAGNOSIS — R06.02 SHORTNESS OF BREATH: ICD-10-CM

## 2023-10-02 DIAGNOSIS — F41.1 GENERALIZED ANXIETY DISORDER: ICD-10-CM

## 2023-10-02 DIAGNOSIS — Z90.710 HISTORY OF HYSTERECTOMY FOR CANCER: ICD-10-CM

## 2023-10-02 DIAGNOSIS — Z12.4 CERVICAL CANCER SCREENING: ICD-10-CM

## 2023-10-02 DIAGNOSIS — Z87.891 PERSONAL HISTORY OF NICOTINE DEPENDENCE: ICD-10-CM

## 2023-10-02 DIAGNOSIS — Z12.11 SCREEN FOR COLON CANCER: ICD-10-CM

## 2023-10-02 DIAGNOSIS — G40.909 NONINTRACTABLE EPILEPSY WITHOUT STATUS EPILEPTICUS, UNSPECIFIED EPILEPSY TYPE (H): ICD-10-CM

## 2023-10-02 DIAGNOSIS — R73.09 ELEVATED GLUCOSE LEVEL: ICD-10-CM

## 2023-10-02 DIAGNOSIS — Z12.2 SCREENING FOR LUNG CANCER: ICD-10-CM

## 2023-10-02 DIAGNOSIS — M62.838 MUSCLE SPASM: Primary | ICD-10-CM

## 2023-10-02 PROCEDURE — 91320 SARSCV2 VAC 30MCG TRS-SUC IM: CPT | Performed by: FAMILY MEDICINE

## 2023-10-02 PROCEDURE — 90682 RIV4 VACC RECOMBINANT DNA IM: CPT | Performed by: FAMILY MEDICINE

## 2023-10-02 PROCEDURE — 90480 ADMN SARSCOV2 VAC 1/ONLY CMP: CPT | Performed by: FAMILY MEDICINE

## 2023-10-02 PROCEDURE — G0008 ADMIN INFLUENZA VIRUS VAC: HCPCS | Performed by: FAMILY MEDICINE

## 2023-10-02 PROCEDURE — 99214 OFFICE O/P EST MOD 30 MIN: CPT | Mod: 25 | Performed by: FAMILY MEDICINE

## 2023-10-02 RX ORDER — ALBUTEROL SULFATE 90 UG/1
2 AEROSOL, METERED RESPIRATORY (INHALATION) EVERY 4 HOURS PRN
Qty: 8.5 G | Refills: 5 | Status: SHIPPED | OUTPATIENT
Start: 2023-10-02 | End: 2024-01-24

## 2023-10-02 NOTE — PROGRESS NOTES
1. Muscle spasm  2. Scoliosis of thoracolumbar spine, unspecified scoliosis type  This is a 64 yo female with chronic low back pain - we discussed this today - reviewed exam/history and previous xrays.  She has profound scoliotic curvature in thoracic/lumbar spine.  Will continue Tizanidine prn for muscle spasm (she notes this does help in some manner); will refer to Spine to see if there is any additional measures to try for comfort.    - tiZANidine (ZANAFLEX) 4 MG tablet; TAKE 1 TABLET(4 MG) BY MOUTH EVERY NIGHT AS NEEDED FOR MUSCLE SPASMS  Dispense: 90 tablet; Refill: 1  - Spine  Referral; Future    3. Elevated glucose level  H/o elevated glucose level - no sign of diabetes     4. Screen for colon cancer  Due for colon cancer screening - declines     5. Need for COVID-19 vaccine  Due for COVID-19 vaccine - ordered   - COVID-19 12+ (2023-24) (PFIZER)    6. Need for immunization against influenza  Due for seasonal influenza vaccine - ordered   - INFLUENZA VACCINE 18-64Y (FLUBLOK)    7. Shortness of breath  Complains of shortness of breath - has benefited from Albuterol inhaler in past - uses sparingly  - albuterol (PROAIR HFA/PROVENTIL HFA/VENTOLIN HFA) 108 (90 Base) MCG/ACT inhaler; Inhale 2 puffs into the lungs every 4 hours as needed for wheezing  Dispense: 8.5 g; Refill: 5    8. Screening for lung cancer  9. Personal history of nicotine dependence  Discussed nicotine dependence and option for lung cancer screening - patient seen with sister - they are agreeable to lung cancer screening -   - CT Chest Lung Cancer Screen Low Dose Without; Future    10. Cervical cancer screening  11. History of hysterectomy for cancer  Due for cervix cancer screening - discussed - had h/o hysterectomy for cancer, so screening would be reasonable; still declines.     12. Generalized anxiety disorder  General anxiety - has difficulty with interpersonal relationships due to significant anxiety -     13. Nonintractable  "epilepsy without status epilepticus, unspecified epilepsy type (H)  Patient with epilepsy - has had recent difficulties with med refills (needs brand name meds on Keppra, but not Tegretol.  Also needs branded Synthroid).  When she takes generic Keppra, she has more breakthrough seizures.      14. Encounter for medication refill  Due for med refill:  - albuterol (PROAIR HFA/PROVENTIL HFA/VENTOLIN HFA) 108 (90 Base) MCG/ACT inhaler; Inhale 2 puffs into the lungs every 4 hours as needed for wheezing  Dispense: 8.5 g; Refill: 5      Subjective   Virginia is a 63 year old, presenting for the following health issues:  Back Pain        10/2/2023     1:20 PM   Additional Questions   Roomed by Vonda   Accompanied by Sister         10/2/2023     1:20 PM   Patient Reported Additional Medications   Patient reports taking the following new medications CBD Gummies, Volarian Root       Finally got the medication straightened out -   Needs the Keppra - brand name - the generic makes her have seizures  Hadn't had a seizure in \"ever\" - grand mals   Had to switch to the generic and had a seizure  Nephew had a seizure with alcohol - and     Patient has been recently taking hydrochlorothiazide -   Less night time awakenings -     Back hurts -       History of Present Illness       Back Pain:  She presents for follow up of back pain. Patient's back pain is a chronic problem.  Location of back pain:  Right middle of back  Description of back pain: shooting  Back pain spreads: nowhere    Since patient first noticed back pain, pain is: gradually worsening  Does back pain interfere with her job:  Not applicable       Reason for visit:  Mybackmyrightear    She eats 2-3 servings of fruits and vegetables daily.She consumes 1 sweetened beverage(s) daily.She exercises with enough effort to increase her heart rate 9 or less minutes per day.  She exercises with enough effort to increase her heart rate 3 or less days per week.   She is taking " "medications regularly.         Review of Systems   Constitutional:  Negative for chills and fever.   HENT:  Negative for congestion, ear pain and sore throat.    Eyes:  Negative for pain and visual disturbance.   Respiratory:  Negative for cough and shortness of breath.    Cardiovascular:  Negative for chest pain, palpitations and peripheral edema.   Gastrointestinal:  Negative for abdominal pain, constipation and diarrhea.   Endocrine: Negative for polyuria.   Genitourinary:  Negative for dysuria, frequency and vaginal discharge.   Musculoskeletal:  Positive for back pain (with scoliosis, muscle spasm). Negative for arthralgias and myalgias.   Skin:  Negative for rash.   Allergic/Immunologic: Negative.    Neurological:  Positive for seizures. Negative for dizziness, weakness, headaches and paresthesias.   Hematological:  Does not bruise/bleed easily.   Psychiatric/Behavioral: Negative.     All other systems reviewed and are negative.           Objective    /75 (BP Location: Left arm, Patient Position: Sitting, Cuff Size: Adult Small)   Pulse 81   Temp 98  F (36.7  C) (Temporal)   Resp 20   Ht 1.53 m (5' 0.24\")   Wt 55.3 kg (122 lb)   LMP  (LMP Unknown)   SpO2 99%   BMI 23.64 kg/m    Body mass index is 23.64 kg/m .  Physical Exam  Vitals reviewed.   Constitutional:       General: She is not in acute distress.     Appearance: Normal appearance.   HENT:      Head: Normocephalic.      Right Ear: Tympanic membrane, ear canal and external ear normal.      Left Ear: Tympanic membrane, ear canal and external ear normal.      Nose: Nose normal.      Mouth/Throat:      Mouth: Mucous membranes are moist.      Pharynx: No posterior oropharyngeal erythema.   Eyes:      Extraocular Movements: Extraocular movements intact.      Conjunctiva/sclera: Conjunctivae normal.      Pupils: Pupils are equal, round, and reactive to light.   Cardiovascular:      Rate and Rhythm: Normal rate and regular rhythm.      Pulses: " Normal pulses.      Heart sounds: Normal heart sounds. No murmur heard.  Pulmonary:      Effort: Pulmonary effort is normal.      Breath sounds: Normal breath sounds.   Abdominal:      Palpations: Abdomen is soft. There is no mass.      Tenderness: There is no abdominal tenderness. There is no guarding or rebound.   Musculoskeletal:         General: Deformity (severe scoliotic deformity of thoracic/lumbar spien) present. Normal range of motion.      Cervical back: Normal range of motion and neck supple.   Lymphadenopathy:      Cervical: No cervical adenopathy.   Skin:     General: Skin is warm and dry.   Neurological:      General: No focal deficit present.      Mental Status: She is alert.      Gait: Gait abnormal.   Psychiatric:      Comments: Mild cognitive deficit; significant anxiety            No results found for any visits on 10/02/23.          Prior to immunization administration, verified patients identity using patient s name and date of birth. Please see Immunization Activity for additional information.     Screening Questionnaire for Adult Immunization    Are you sick today?   No   Do you have allergies to medications, food, a vaccine component or latex?   No   Have you ever had a serious reaction after receiving a vaccination?   No   Do you have a long-term health problem with heart, lung, kidney, or metabolic disease (e.g., diabetes), asthma, a blood disorder, no spleen, complement component deficiency, a cochlear implant, or a spinal fluid leak?  Are you on long-term aspirin therapy?   No   Do you have cancer, leukemia, HIV/AIDS, or any other immune system problem?   No   Do you have a parent, brother, or sister with an immune system problem?   Don't Know   In the past 3 months, have you taken medications that affect  your immune system, such as prednisone, other steroids, or anticancer drugs; drugs for the treatment of rheumatoid arthritis, Crohn s disease, or psoriasis; or have you had radiation  treatments?   No   Have you had a seizure, or a brain or other nervous system problem?   Yes   During the past year, have you received a transfusion of blood or blood    products, or been given immune (gamma) globulin or antiviral drug?   No   For women: Are you pregnant or is there a chance you could become       pregnant during the next month?   No   Have you received any vaccinations in the past 4 weeks?   No     Immunization questionnaire was positive for at least one answer.  Notified Dr. Gutiérrez.      Patient instructed to remain in clinic for 15 minutes afterwards, and to report any adverse reactions.     Screening performed by Vonda Villanueva CMA on 10/2/2023 at 1:25 PM.

## 2023-10-08 ASSESSMENT — ENCOUNTER SYMPTOMS
SORE THROAT: 0
ABDOMINAL PAIN: 0
BACK PAIN: 1
HEADACHES: 0
FEVER: 0
PALPITATIONS: 0
ALLERGIC/IMMUNOLOGIC NEGATIVE: 1
CHILLS: 0
BRUISES/BLEEDS EASILY: 0
FREQUENCY: 0
DIZZINESS: 0
ARTHRALGIAS: 0
DYSURIA: 0
EYE PAIN: 0
SEIZURES: 1
SHORTNESS OF BREATH: 0
DIARRHEA: 0
MYALGIAS: 0
CONSTIPATION: 0
COUGH: 0
WEAKNESS: 0
PARESTHESIAS: 0
PSYCHIATRIC NEGATIVE: 1

## 2023-10-27 DIAGNOSIS — M62.838 MUSCLE SPASM: ICD-10-CM

## 2023-11-10 DIAGNOSIS — G40.A09 NONINTRACTABLE ABSENCE EPILEPSY WITHOUT STATUS EPILEPTICUS (H): ICD-10-CM

## 2023-11-10 DIAGNOSIS — M54.50 CHRONIC LEFT-SIDED LOW BACK PAIN WITHOUT SCIATICA: ICD-10-CM

## 2023-11-10 DIAGNOSIS — M41.9 SCOLIOSIS OF THORACOLUMBAR SPINE, UNSPECIFIED SCOLIOSIS TYPE: ICD-10-CM

## 2023-11-10 DIAGNOSIS — G89.29 CHRONIC LEFT-SIDED LOW BACK PAIN WITHOUT SCIATICA: ICD-10-CM

## 2023-11-10 RX ORDER — LIDOCAINE 50 MG/G
1 PATCH TOPICAL EVERY 24 HOURS
Qty: 30 PATCH | Refills: 1 | Status: SHIPPED | OUTPATIENT
Start: 2023-11-10 | End: 2024-01-17

## 2023-11-10 NOTE — TELEPHONE ENCOUNTER
Medication Question or Refill    What medication are you calling about (include dose and sig)?: KEPPRA  MG 24 hr tablet   lidocaine (LIDODERM) 5 % patch     Preferred Pharmacy:  [id number] - [place], [place] - [address] [place] [address] AT [facility] OF [address]  [address] [place] [address]  Phone: [phone] Fax: [phone]      Controlled Substance Agreement on file:   CSA -- Patient Level:    CSA: None found at the patient level.       Who prescribed the medication?: Ulstad    Do you need a refill? Yes    When did you use the medication last? N/A    Do you have any questions or concerns?  Yes: Patient wants to make sure if its for brand name for her Keppra XR.      Okay to leave a detailed message?: Yes at Home number on file [phone] (home)

## 2023-11-13 RX ORDER — LEVETIRACETAM 500 MG
1000 TABLET, EXTENDED RELEASE 24 HR ORAL 2 TIMES DAILY
Qty: 180 TABLET | Refills: 3 | Status: SHIPPED | OUTPATIENT
Start: 2023-11-13 | End: 2023-11-27

## 2023-11-15 ENCOUNTER — TELEPHONE (OUTPATIENT)
Dept: FAMILY MEDICINE | Facility: CLINIC | Age: 63
End: 2023-11-15
Payer: MEDICARE

## 2023-11-15 DIAGNOSIS — R06.02 SHORTNESS OF BREATH: ICD-10-CM

## 2023-11-15 DIAGNOSIS — R60.0 LOWER EXTREMITY EDEMA: ICD-10-CM

## 2023-11-15 DIAGNOSIS — Z76.0 ENCOUNTER FOR MEDICATION REFILL: ICD-10-CM

## 2023-11-15 RX ORDER — TRIAMTERENE AND HYDROCHLOROTHIAZIDE 37.5; 25 MG/1; MG/1
1 CAPSULE ORAL DAILY PRN
Qty: 90 CAPSULE | Refills: 1 | Status: SHIPPED | OUTPATIENT
Start: 2023-11-15 | End: 2024-05-29

## 2023-11-15 NOTE — TELEPHONE ENCOUNTER
Medication Question or Refill    Contacts         Type Contact Phone/Fax    11/15/2023 10:52 AM CST Phone (Incoming) Kerry Reed (Self) [phone] (M)            What medication are you calling about (include dose and sig)?: triamterene-HCTZ (DYAZIDE) 37.5-25 MG capsule     Preferred Pharmacy:  [facility] #[id number] - [place], [place] - [address] AT [address]  [address] [place] [address]  Phone: [phone] Fax: [phone]      Controlled Substance Agreement on file:   CSA -- Patient Level:    CSA: None found at the patient level.       Who prescribed the medication?: Dr. Gutiérrez    Do you need a refill? Yes    When did you use the medication last? N/A    Patient offered an appointment? No    Do you have any questions or concerns?  No      Okay to leave a detailed message?: Yes at Home number on file [phone] ([place])    
DISPLAY PLAN FREE TEXT
DISPLAY PLAN FREE TEXT

## 2023-11-15 NOTE — TELEPHONE ENCOUNTER
New Medication Request    Contacts         Type Contact Phone/Fax    11/15/2023 10:57 AM CST Phone (Incoming) Kerry Reed (Self) [phone] (M)            What medication are you requesting?: Generic Inhaler    Reason for medication request: patient stated that her insurance would only cover a generic inhaler. Please advise    Have you taken this medication before?: No    Controlled Substance Agreement on file:   CSA -- Patient Level:    CSA: None found at the patient level.         Patient offered an appointment? No    Preferred Pharmacy:   [facility][id number] - [place], [place] - [address] [place] [address] AT [facility] OF [address]  [address] [place] [address]  Phone: [phone] Fax: [phone]      Okay to leave a detailed message?: Yes at Home number on file [phone] (home)

## 2023-11-15 NOTE — TELEPHONE ENCOUNTER
Patient called back about request. Was wondering if you could write another letter for her insurance company so that she can get the Kepra. Asked if the previous letters written for it could be used, patient said that it could not and she needs a new letter. Please advise.

## 2023-11-16 ENCOUNTER — TELEPHONE (OUTPATIENT)
Dept: FAMILY MEDICINE | Facility: CLINIC | Age: 63
End: 2023-11-16
Payer: MEDICARE

## 2023-11-16 DIAGNOSIS — M54.50 CHRONIC LEFT-SIDED LOW BACK PAIN WITHOUT SCIATICA: Primary | ICD-10-CM

## 2023-11-16 DIAGNOSIS — G89.29 CHRONIC LEFT-SIDED LOW BACK PAIN WITHOUT SCIATICA: Primary | ICD-10-CM

## 2023-11-16 DIAGNOSIS — M41.9 SCOLIOSIS OF THORACOLUMBAR SPINE, UNSPECIFIED SCOLIOSIS TYPE: ICD-10-CM

## 2023-11-16 NOTE — TELEPHONE ENCOUNTER
Medication Question or Refill    Contacts         Type Contact Phone/Fax    11/16/2023 11:35 AM CST Phone (Incoming) Derek Kerry QUINTANA (Self) [phone] (M)            What medication are you calling about (include dose and sig)?: Alternative for Lidocaine Patches    Preferred Pharmacy:   [facility] #[id number] - [place], [place] - [address]  [address] [place] [address]  Phone: [phone] Fax: [phone]      Controlled Substance Agreement on file:   CSA -- Patient Level:    CSA: None found at the patient level.       Who prescribed the medication?:     Do you need a refill? Yes    When did you use the medication last?     Patient offered an appointment? No    Do you have any questions or concerns?  Yes: Pharmacy was only able to give her 5 patches at last refill. Patches are on back order for another month. Pt is asking if PCP can please send in an alternative to cover for a month worth until the order comes in. Pt only has 1 patch left and needs this ASAP.      Okay to leave a detailed message?: Yes at Home number on file [phone] (home)

## 2023-11-17 RX ORDER — LIDOCAINE 4 G/G
1 PATCH TOPICAL EVERY 24 HOURS
Qty: 30 PATCH | Refills: 1 | Status: SHIPPED | OUTPATIENT
Start: 2023-11-17 | End: 2024-01-28

## 2023-11-17 NOTE — TELEPHONE ENCOUNTER
I sent prescription for a different dose of same medication.  Hopefully those are available.  If not, patient could check another pharmacy for availability.  Or try over the counter patches.

## 2023-11-17 NOTE — TELEPHONE ENCOUNTER
Message routed to PCP, Dr Gutiérrez for review.  2 telephone encounters for same refill.    Zee Oh RN  [facility]      Pt calling back to check the status of this refill, [facility] does not have this in stock and recommend pt to ask PCP for an alternative. Please send abdi alternative to pharmacy ASAP as pt really needs this to help her sleep at night. Please call and let pt know once it has been sent over to pharmacy, thanks!

## 2023-11-17 NOTE — TELEPHONE ENCOUNTER
Message routed to PCP, Dr Gutiérrez for review.  2 telephone encounters for same refill.    Zee Oh RN  [facility]

## 2023-11-17 NOTE — TELEPHONE ENCOUNTER
Pt calling back to check the status of this refill, [facility] does not have this in stock and recommend pt to ask PCP for an alternative. Please send abdi alternative to pharmacy ASAP as pt really needs this to help her sleep at night. Please call and let pt know once it has been sent over to pharmacy, thanks!

## 2023-11-17 NOTE — TELEPHONE ENCOUNTER
Called patient--left detailed voicemail (ok per patient per initial note) and relayed message below from Dr. Gutiérrez.

## 2023-11-27 DIAGNOSIS — G40.A09 NONINTRACTABLE ABSENCE EPILEPSY WITHOUT STATUS EPILEPTICUS (H): ICD-10-CM

## 2023-11-27 DIAGNOSIS — Z76.0 ENCOUNTER FOR MEDICATION REFILL: ICD-10-CM

## 2023-11-27 RX ORDER — CARBAMAZEPINE 200 MG/1
TABLET ORAL
Qty: 180 TABLET | Refills: 3 | OUTPATIENT
Start: 2023-11-27

## 2023-11-27 NOTE — TELEPHONE ENCOUNTER
Medication Question or Refill    Contacts         Type Contact Phone/Fax    11/27/2023 09:43 AM CST Phone (Incoming) Kerry Reed (Self) [phone] (M)            What medication are you calling about (include dose and sig)?: KEPPRA  MG 24 hr tablet     Preferred Pharmacy:  [facility] #[id number] - [place], [place] - [address] [place] [address] AT [address]  [address] [place] [address]  Phone: [phone] Fax: [phone]      Controlled Substance Agreement on file:   CSA -- Patient Level:    CSA: None found at the patient level.       Who prescribed the medication?: Ulstad    Do you need a refill? Yes    When did you use the medication last? N/A    Patient offered an appointment? No    Do you have any questions or concerns?  Yes: Patient stated she was told by the pharmacy that the Rx was closed out. Caller stated a new Rx needs to be sent before tomorrow because she will be out of her medication by then. Please advise    Okay to leave a detailed message?: Yes at Home number on file [phone] (home)

## 2023-11-28 RX ORDER — LEVETIRACETAM 500 MG
1000 TABLET, EXTENDED RELEASE 24 HR ORAL 2 TIMES DAILY
Qty: 180 TABLET | Refills: 3 | Status: SHIPPED | OUTPATIENT
Start: 2023-11-28 | End: 2024-01-18

## 2023-11-28 NOTE — TELEPHONE ENCOUNTER
Pt went to pharmacy on 11/27/23 (TC verified [facility] on [address]) to get her refills. They told her that she needs to contact PCP for refill. Can PCP Team call Pharmacy to please figure out what is going on? Pt stated that she is very frustrated and in too much pain to be going back and forth to the Pharmacy. Pt asking if PCP or team, please call Pharmacy and find out why they are refusing to give her the refills.

## 2023-11-29 DIAGNOSIS — G40.A09 NONINTRACTABLE ABSENCE EPILEPSY WITHOUT STATUS EPILEPTICUS (H): ICD-10-CM

## 2023-11-29 DIAGNOSIS — Z76.0 ENCOUNTER FOR MEDICATION REFILL: ICD-10-CM

## 2023-11-29 RX ORDER — CARBAMAZEPINE 200 MG/1
TABLET ORAL
Qty: 180 TABLET | Refills: 3 | Status: SHIPPED | OUTPATIENT
Start: 2023-11-29

## 2023-12-19 ENCOUNTER — TELEPHONE (OUTPATIENT)
Dept: FAMILY MEDICINE | Facility: CLINIC | Age: 63
End: 2023-12-19
Payer: MEDICARE

## 2023-12-19 NOTE — TELEPHONE ENCOUNTER
Call: Patient seeing Dr. Gutiérrez 12/27/23. Does she want to complete RN Annual Wellness while she is here? This usually adds an additional 10-20 minutes to appointment.    Left voicemail for patient to return call to clinic. If patient calls back, please update provider appointment notes if she agrees to AWV.

## 2023-12-28 ENCOUNTER — TELEPHONE (OUTPATIENT)
Dept: FAMILY MEDICINE | Facility: CLINIC | Age: 63
End: 2023-12-28
Payer: MEDICARE

## 2023-12-28 NOTE — TELEPHONE ENCOUNTER
Forms/Letter Request    Type of form/letter:  Letter for insurance    Have you been seen for this request: N/A    Do we have the form/letter: No, Patient stated she needs  to write her a letter so she can get keppra and not the generic. Caller stated it was faxed or mailed before but she doesn't have the the information. Caller stated she needs to have in by the 31st.writer found fax number from previous letter from pt's insurance in pt's media tab. Please advise    When is form/letter needed by: 12/31/23    How would you like the form/letter returned: Fax : [phone]    Patient Notified form requests are processed in 3-5 business days:Yes    Okay to leave a detailed message?: Yes at Home number on file [phone] (home)

## 2023-12-28 NOTE — LETTER
1/3/2024     Kerry Reed   1960  [address]  [place] [address]       To Whom It May Concern:    Please allow this patient to fill her brand name Keppra XR and Synthroid. This is on ongoing issue for this patient.      Her history on generic Levetiracetam XR is that she actually suffers more seizures when using the generic medication.  Insurance denied her brand name medication last summer - within a week, she had a grand mal seizure (she doesn't have seizures when she is taking the brand name).  It seems inhumane to deny access to a drug (Keppra XR) that prevents her grand mal seizures, rather than providing only medications which do not protect her.  The branded drug has kept her stable over time.  Please consider ongoing coverage for this medication.    As well, she has not been able to tolerate generic Levothyroxine.  This medication has given her blisters/peeling on her hands.  She has done well with Synthroid.  Please consider ongoing coverage for this medication, as well.      If you have further questions, please do not hesitate to contact me.     Sincerely,        CONNOR TIWARI MD

## 2024-01-03 NOTE — TELEPHONE ENCOUNTER
Pt calling back checking on the status of this refill. Pt really need this no later than tomorrow. Pt is very upset as what's taking so long because she called about this a few days ago.

## 2024-01-03 NOTE — TELEPHONE ENCOUNTER
Patient calls back inquiring about status of request. Writer inform caller message was sent to provider- awaiting for response. Writer will send provider another message. Caller verbalizes understanding. Patient stated she almost out of the medication and needs asap. Please advise

## 2024-01-04 NOTE — TELEPHONE ENCOUNTER
Called and LVM that letter was done and faxed to [facility], but received a letter back from [facility] stating pt doesn't have that insurance anymore, and asked if pt could call back to verify where the letter needs to go.

## 2024-01-04 NOTE — TELEPHONE ENCOUNTER
Letter faxed to [phone] on 1/4/2024 at 7:36am. Please call patient and let her know that this was done.

## 2024-01-17 ENCOUNTER — TELEPHONE (OUTPATIENT)
Dept: FAMILY MEDICINE | Facility: CLINIC | Age: 64
End: 2024-01-17
Payer: MEDICARE

## 2024-01-17 DIAGNOSIS — M54.50 CHRONIC LEFT-SIDED LOW BACK PAIN WITHOUT SCIATICA: ICD-10-CM

## 2024-01-17 DIAGNOSIS — G40.A09 NONINTRACTABLE ABSENCE EPILEPSY WITHOUT STATUS EPILEPTICUS (H): ICD-10-CM

## 2024-01-17 DIAGNOSIS — G40.909 NONINTRACTABLE EPILEPSY WITHOUT STATUS EPILEPTICUS, UNSPECIFIED EPILEPSY TYPE (H): Primary | ICD-10-CM

## 2024-01-17 DIAGNOSIS — G89.29 CHRONIC LEFT-SIDED LOW BACK PAIN WITHOUT SCIATICA: ICD-10-CM

## 2024-01-17 DIAGNOSIS — M41.9 SCOLIOSIS OF THORACOLUMBAR SPINE, UNSPECIFIED SCOLIOSIS TYPE: ICD-10-CM

## 2024-01-17 RX ORDER — LIDOCAINE 50 MG/G
PATCH TOPICAL
Qty: 30 PATCH | Refills: 1 | Status: SHIPPED | OUTPATIENT
Start: 2024-01-17 | End: 2024-01-28

## 2024-01-17 RX ORDER — LEVETIRACETAM 500 MG
1000 TABLET, EXTENDED RELEASE 24 HR ORAL 2 TIMES DAILY
Qty: 180 TABLET | Refills: 3 | OUTPATIENT
Start: 2024-01-17

## 2024-01-17 NOTE — TELEPHONE ENCOUNTER
New Medication Request        What medication are you requesting?: Pain med    Reason for medication request: Back pain    Have you taken this medication before?: NA    Controlled Substance Agreement on file:   CSA -- Patient Level:    CSA: None found at the patient level.         Patient offered an appointment? No    Preferred Pharmacy:     [facility][id number] - [place], [place] - [address] [place] [address] AT [facility] [address]  [address] [place] [address]  Phone: [phone] Fax: [phone]    Okay to leave a detailed message?: Yes at Home number on file [phone] (home)

## 2024-01-17 NOTE — TELEPHONE ENCOUNTER
Pt called stating that her Lidocaine patch is being delayed by her insurance insurance and she needs something now. Pt stated her back pain is at a 9 out of 10. RN offered to triage pt regarding her back pain but pt refused and requesting for medication to help with back pain.        Cecil Macias Cem Say, BSN RN  [facility]

## 2024-01-17 NOTE — TELEPHONE ENCOUNTER
Medication Question or Refill        What medication are you calling about (include dose and sig)?: KEPPRA  MG 24 hr tablet     Preferred Pharmacy:  [id number] - [place], [place] - [address] [place] [address] AT [facility] [address]  [address] [place] [address]  Phone: [phone] Fax: [phone]      Controlled Substance Agreement on file:   CSA -- Patient Level:    CSA: None found at the patient level.       Who prescribed the medication?: PCP    Do you need a refill? Yes, pt called in to request a refill on medication as she states she only has enough to last until tomorrow. Please look into this request and advise. Send to pharmacy if applicable.  Thanks!    When did you use the medication last? N/A    Patient offered an appointment? No    Do you have any questions or concerns?  No      Okay to leave a detailed message?: Yes at Home number on file [phone] (home)

## 2024-01-18 ENCOUNTER — PATIENT OUTREACH (OUTPATIENT)
Dept: CARE COORDINATION | Facility: CLINIC | Age: 64
End: 2024-01-18
Payer: MEDICARE

## 2024-01-18 NOTE — TELEPHONE ENCOUNTER
Pt calling to request this as she will be out on Friday. Also requesting a referral to a different spine clinic specialist as it's too far the one she goes to.

## 2024-01-18 NOTE — TELEPHONE ENCOUNTER
Dr. Espinoza: Please advise if there is an additional medication you recommend for patient for her back pain to bridge patient to upcoming visit?  Patient is scheduled with you on 1/22/24 for a virtual visit.    Care Coordination-Please reach out to patient regarding assistance for completion of [employer] Forms.  Patient shared with writer she does not go out during the winter months.    Call received from patient with additional requests:  Lost [employer] mobility papers; needs new ones  Wants Spine clinic appt close to home-[place] far  Would like pain medication prescribed for her back pain (declined triage)  Why is it taking so long for her Lidocaine patches to be ordered?    Writer reviewed with patient:  High priority message was sent to PCP regarding request for pain medication  On 1/17/24 orders for Lidocaine 5% patches were sent to [id number] - [place], [place] - [address] AT [facility] [address]   For questions regarding coverage, it would be ideal for patient to contact health insurance company  Will send message to Care Coordination team regarding [employer] Mobility forms  Can transfer patient's call to Spine scheduling team to search and schedule an appointment that is more geographically convenient for patient  Offered to search for a virtual appt with PCP or another clinic provider today or tomorrow to discuss pain medication and management plan.  Patient declined and stated she has virtual visit with Dr. Espinoza on 1/22/24    Patient verbalized understanding and stated:  Does not plan to call her insurance company because she does not like speaking with them  Agreeable to her call being transferred to Spine scheduling    Patient's call transferred to Spine [facility] scheduling line: [phone].     Care Coordination referral ordered.      Thank you!  SOLIS GarciaN, RN-[facility]

## 2024-01-18 NOTE — LETTER
[facility] CARE COORDINATION  [address] [facility] [address]    January 22, 2024    Kerry Reed  [address]  [place] [address]      Dear [place],    I am a clinic community health worker who works with CONNOR TIWARI MD with the [facility]. I have been trying to reach you recently to introduce Clinic Care Coordination. Below is a description of clinic care coordination and how I can further assist you.       The clinic care coordination team is made up of a registered nurse, , financial resource worker and community health worker who understand the health care system. The goal of clinic care coordination is to help you manage your health and improve access to the health care system. Our team works alongside your provider to assist you in determining your health and social needs. We can help you obtain health care and community resources, providing you with necessary information and education. We can work with you through any barriers and develop a care plan that helps coordinate and strengthen the communication between you and your care team.  Our services are voluntary and are offered without charge to you personally.    Please feel free to contact me at [phone] with any questions or concerns regarding care coordination and what we can offer.      We are focused on providing you with the highest-quality healthcare experience possible.    Sincerely,     Jonathan Chan  Community Health Worker  [facility] Care Coordination  [email or web address]  [email or web address]   Office: [phone]  Fax: [phone]

## 2024-01-18 NOTE — PROGRESS NOTES
1/18/2024  Clinic Care Coordination Contact  [facility]/Voicemail    Clinical Data: Care Coordinator Outreach    Outreach Documentation Number of Outreach Attempt   1/18/2024   3:10 PM 1     PCP referral: resources for support -[employer] Mobility form    CHW mailed out [employer] Mobility Application to patient.      Clinical Data: Care Coordinator Outreach: Discuss CCC enrollment   Outreach attempted x 1.  Left message on patient's voicemail with call back information and requested return call.    Plan: Care Coordinator will try to reach patient again in 1-3 business days.    CHW Outreach: 2nd attempt 1-19-24    Jonathan Chan  Community Health Worker  [facility] Care Coordination  [email or web address]   Office: [phone]  Fax: [phone]

## 2024-01-18 NOTE — PROGRESS NOTES
Clinic Care Coordination Contact  Care Team Conversations    Message from Care team request CC support   Open program and CHW to outreach to  share resources and assessment if additional CC support need

## 2024-01-19 ENCOUNTER — TELEPHONE (OUTPATIENT)
Dept: FAMILY MEDICINE | Facility: CLINIC | Age: 64
End: 2024-01-19
Payer: MEDICARE

## 2024-01-19 RX ORDER — LEVETIRACETAM 500 MG
1000 TABLET, EXTENDED RELEASE 24 HR ORAL 2 TIMES DAILY
Qty: 180 TABLET | Refills: 3 | Status: SHIPPED | OUTPATIENT
Start: 2024-01-19 | End: 2024-05-16

## 2024-01-19 NOTE — PROGRESS NOTES
1/19/2024  Clinic Care Coordination Contact  [facility]/Voicemail    Clinical Data: Care Coordinator Outreach    Outreach Documentation Number of Outreach Attempt   1/18/2024   3:10 PM 1   1/19/2024  11:57 AM 1   1/19/2024  12:59 PM 1       PCP referral: resources for support -[employer] Mobility form  CHW mailed [employer] Mobility Application to patient.    Clinical Data: Care Coordinator Outreach: Discuss CCC enrollment   Outreach attempted x 1.  Left message on patient's voicemail with call back information and requested return call.    Plan: Care Coordinator will try to reach patient again in 1-3 business days.    CHW Outreach: 2nd attempt 1-22-24    Jonathan Chan  Community Health Worker  [facility] Care Coordination  [email or web address]   Office: [phone]  Fax: [phone]

## 2024-01-19 NOTE — PROGRESS NOTES
1/19/2024    Clinic Care Coordination Contact  [facility]/[place]    Clinical Data: Care Coordinator Outreach    Outreach Documentation Number of Outreach Attempt   1/18/2024   3:10 PM 1   1/19/2024  11:57 AM 1     Called patient but phone keeps getting disconnected. Unable to connect with pt.  Will call back later on today    PCP referral: resources for support -[employer] Mobility form  CHW mailed [employer] Mobility Application to patient.    Jonathan Chan  Community Health Worker  [facility]  Clinic Care Coordination  [email or web address]   Office: [phone]  Fax: [phone]

## 2024-01-19 NOTE — TELEPHONE ENCOUNTER
Patient states unable to get refill on Kepra, though has refills through 6 mos.  Callled [facility] and they filled 180 tabs with 2 + refills remaining. Patient advised.    Lidya Ramsey RN, BSN, PHN  [facility]  [phone]

## 2024-01-22 ENCOUNTER — VIRTUAL VISIT (OUTPATIENT)
Dept: FAMILY MEDICINE | Facility: CLINIC | Age: 64
End: 2024-01-22
Payer: MEDICARE

## 2024-01-22 ENCOUNTER — TELEPHONE (OUTPATIENT)
Dept: FAMILY MEDICINE | Facility: CLINIC | Age: 64
End: 2024-01-22

## 2024-01-22 DIAGNOSIS — G89.29 CHRONIC LEFT-SIDED LOW BACK PAIN WITHOUT SCIATICA: ICD-10-CM

## 2024-01-22 DIAGNOSIS — M54.50 CHRONIC LEFT-SIDED LOW BACK PAIN WITHOUT SCIATICA: ICD-10-CM

## 2024-01-22 DIAGNOSIS — F43.21 GRIEF: ICD-10-CM

## 2024-01-22 DIAGNOSIS — M41.9 SCOLIOSIS OF THORACOLUMBAR SPINE, UNSPECIFIED SCOLIOSIS TYPE: ICD-10-CM

## 2024-01-22 DIAGNOSIS — Z12.11 SCREEN FOR COLON CANCER: Primary | ICD-10-CM

## 2024-01-22 DIAGNOSIS — F41.1 GENERALIZED ANXIETY DISORDER: ICD-10-CM

## 2024-01-22 PROCEDURE — 99442 PR PHYSICIAN TELEPHONE EVALUATION 11-20 MIN: CPT | Mod: 93 | Performed by: FAMILY MEDICINE

## 2024-01-22 NOTE — PROGRESS NOTES
"Virginia is a 63 year old who is being evaluated via a billable telephone visit.      What phone number would you like to be contacted at? [phone]   How would you like to obtain your AVS? Claribel    Distant Location (provider location):  On-site    1. Chronic left-sided low back pain without sciatica  2. Scoliosis of thoracolumbar spine, unspecified scoliosis type  This is a 62 yo female with chronic left sided low back pain - related to significant scoliosis of thoracolumbar spine.  Has used Tizanidine in past, but doesn't feel like it is effective.  Wants a different muscle relaxer - added Cyclobenzaprine.    - cyclobenzaprine (FLEXERIL) 5 MG tablet; Take 1 tablet (5 mg) by mouth nightly as needed for muscle spasms  Dispense: 30 tablet; Refill: 0    3. Grief  Patient notes her long time boyfriend passed away recently - this is causing significant grief - patient indicates she'd like to get back on her \"zoloft\" therapy from previous.  Will restart.    - sertraline (ZOLOFT) 50 MG tablet; Take 1 tablet (50 mg) by mouth daily  Dispense: 90 tablet; Refill: 1    4. Generalized anxiety disorder  As above - grief combined with her significant anxiety - add zoloft back to her medication regimen.  Has tolerated in past.    - sertraline (ZOLOFT) 50 MG tablet; Take 1 tablet (50 mg) by mouth daily  Dispense: 90 tablet; Refill: 1    5. Screen for colon cancer  Due for colon cancer screening - discussed - declines       Subjective   Virginia is a 63 year old, presenting for the following health issues:  Recheck Medication        2024     4:53 PM   Additional Questions   Roomed by Vonda     5:03 pm  Fiance of 35 years  3 weeks ago (he was 89 years old)  Also has a \"boy\" friend  Depression -   Wants sertraline    Somebody \"hacked\" into her insurance  Can't get her patches until   As of  , insurance switches to [employer] -   Went back on [employer]     Back is giving her trouble -   Tizanidine isn't " helping anymore , so she quit   Thinks she needs something stronger than Tizanidine -     Taking valerian to calm down - after nicho's death -     Declines colonoscopy    5:16      History of Present Illness       Back Pain:  She presents for follow up of back pain. Patient's back pain is a chronic problem.  Location of back pain:  Right middle of back and left middle of back  Description of back pain: burning, shooting and stabbing  Back pain spreads: nowhere    Since patient first noticed back pain, pain is: gradually worsening  Does back pain interfere with her job:  Not applicable       Reason for visit:  Go over back pain, the medication is not working. Go over medications              Review of Systems   Constitutional:  Negative for chills and fever.   HENT:  Negative for ear pain and sore throat.    Eyes:  Negative for pain and visual disturbance.   Respiratory:  Negative for cough and shortness of breath.    Cardiovascular:  Negative for chest pain and palpitations.   Gastrointestinal:  Negative for abdominal pain and vomiting.   Genitourinary:  Negative for dysuria and hematuria.   Musculoskeletal:  Positive for back pain (chronic back pain). Negative for arthralgias.   Skin:  Negative for color change and rash.   Neurological:  Negative for seizures and syncope.   Psychiatric/Behavioral:  Positive for dysphoric mood and sleep disturbance. The patient is nervous/anxious.    All other systems reviewed and are negative.          Objective           Vitals:  No vitals were obtained today due to virtual visit.    Physical Exam   General: Alert and no distress //Respiratory: No audible wheeze, cough, or shortness of breath // Psychiatric:  Appropriate affect, tone, and pace of words      No results found for any visits on 01/22/24.      Phone call duration: 13 minutes  Signed Electronically by: CONNOR TIWARI MD

## 2024-01-22 NOTE — PROGRESS NOTES
1/22/2024  Clinic Care Coordination Contact  [facility]/Voicemail    Clinical Data: Care Coordinator Outreach    Outreach Documentation Number of Outreach Attempt   1/18/2024   3:10 PM 1   1/19/2024  11:57 AM 1   1/19/2024  12:59 PM 1   1/22/2024  10:34 AM 2     PCP referral: resources for support -[employer] Mobility form    CHW mailed [employer] Mob[employer] Application to patient on 1-18-24     Clinical Data: Care Coordinator Outreach: Discuss CCC enrollment    Outreach attempted x 2.  Left message on patient's voicemail with call back information and requested return call  Plan: Care Coordinator will send care coordination introduction letter with care coordinator contact information and explanation of care coordination services via mail.     Care Coordinator will do no further outreaches at this time.    Jonathan Chan  Community Health Worker  [facility] Care Coordination  [email or web address]   Office: [phone]  Fax: [phone]

## 2024-01-23 ENCOUNTER — TELEPHONE (OUTPATIENT)
Dept: FAMILY MEDICINE | Facility: CLINIC | Age: 64
End: 2024-01-23
Payer: MEDICARE

## 2024-01-23 DIAGNOSIS — F41.1 GENERALIZED ANXIETY DISORDER: ICD-10-CM

## 2024-01-23 RX ORDER — QUETIAPINE FUMARATE 25 MG/1
25-50 TABLET, FILM COATED ORAL AT BEDTIME
Qty: 60 TABLET | Refills: 1 | Status: CANCELLED | OUTPATIENT
Start: 2024-01-23

## 2024-01-23 NOTE — TELEPHONE ENCOUNTER
Patient called in upset that medications had not been sent to pharmacy as she states were discussed at virtual visit 1/22/24. Writer tried to review note for clarity, but did not see medications documented. Per patient it is for Seroquel and a new pain medication, since her lidocaine patch is not currently covered by insurance. Patient requests this be done as soon as able so she can  all medications tomorrow when going to get her Keppra-thanks!

## 2024-01-24 DIAGNOSIS — R06.02 SHORTNESS OF BREATH: ICD-10-CM

## 2024-01-24 DIAGNOSIS — Z76.0 ENCOUNTER FOR MEDICATION REFILL: ICD-10-CM

## 2024-01-24 RX ORDER — CYCLOBENZAPRINE HCL 5 MG
5 TABLET ORAL
Qty: 30 TABLET | Refills: 0 | Status: SHIPPED | OUTPATIENT
Start: 2024-01-24 | End: 2024-03-13

## 2024-01-24 RX ORDER — ALBUTEROL SULFATE 90 UG/1
2 AEROSOL, METERED RESPIRATORY (INHALATION) EVERY 4 HOURS PRN
Qty: 8.5 G | Refills: 5 | Status: SHIPPED | OUTPATIENT
Start: 2024-01-24 | End: 2024-04-16

## 2024-01-26 ENCOUNTER — TELEPHONE (OUTPATIENT)
Dept: FAMILY MEDICINE | Facility: CLINIC | Age: 64
End: 2024-01-26
Payer: MEDICARE

## 2024-01-26 DIAGNOSIS — G89.29 CHRONIC LEFT-SIDED LOW BACK PAIN WITHOUT SCIATICA: Primary | ICD-10-CM

## 2024-01-26 DIAGNOSIS — R06.02 SHORTNESS OF BREATH: ICD-10-CM

## 2024-01-26 DIAGNOSIS — M54.50 CHRONIC LEFT-SIDED LOW BACK PAIN WITHOUT SCIATICA: Primary | ICD-10-CM

## 2024-01-26 DIAGNOSIS — Z76.0 ENCOUNTER FOR MEDICATION REFILL: ICD-10-CM

## 2024-01-26 RX ORDER — ALBUTEROL SULFATE 90 UG/1
2 AEROSOL, METERED RESPIRATORY (INHALATION) EVERY 4 HOURS PRN
Qty: 8.5 G | Refills: 5 | OUTPATIENT
Start: 2024-01-26

## 2024-01-26 RX ORDER — METHOCARBAMOL 500 MG/1
500 TABLET, FILM COATED ORAL 4 TIMES DAILY PRN
Qty: 60 TABLET | Refills: 0 | Status: SHIPPED | OUTPATIENT
Start: 2024-01-26 | End: 2024-03-05

## 2024-01-26 NOTE — TELEPHONE ENCOUNTER
New Medication Request    Contacts         Type Contact Phone/Fax    01/26/2024 08:21 AM CST Phone (Incoming) Kerry Reed (Self) [phone] (M)        What medication are you requesting?: methocarbamol    Reason for medication request: Back pain 8/10   Patient had a virtual visit on 1/24/24 with PCP, Dr Gutiérrez and Flexeril prescribed. Patient has taken 4 tablets and getting a tingling feeling in her body.  Patient does not want to take Flexeril anymore.    Have you taken this medication before?: Yes:  Patient tried 1 dose of methocarbamol from a friend and it helped her back pain. Patient requesting a prescription     Patient offered an appointment? No    Preferred Pharmacy:  [facility] #[id number] - [place], [place] - [address] [place] [address]  Phone: [phone] Fax: [phone]    Okay to leave a detailed message?: Yes at Cell number on file:    Telephone Information:   Mobile [phone]     Message routed to PCP, Dr Gutiérrez for review / plan    Zee Oh RN  [facility]

## 2024-01-26 NOTE — TELEPHONE ENCOUNTER
I will give her a few of the Methocarbamol, but these are not meant for long-term treatment.  If she is having more pain in her back, she will need to see a back specialist.

## 2024-01-26 NOTE — TELEPHONE ENCOUNTER
Patient calls back inquiring about status of request. Writer inform caller message was sent to provider- awaiting for response. Writer will send provider another message. Caller verbalizes understanding. Patient asked if it could be prescribed today for pt. Please advise

## 2024-01-28 ASSESSMENT — ENCOUNTER SYMPTOMS
BACK PAIN: 1
ABDOMINAL PAIN: 0
COLOR CHANGE: 0
PALPITATIONS: 0
NERVOUS/ANXIOUS: 1
EYE PAIN: 0
DYSURIA: 0
COUGH: 0
SLEEP DISTURBANCE: 1
SORE THROAT: 0
CHILLS: 0
SEIZURES: 0
VOMITING: 0
HEMATURIA: 0
ARTHRALGIAS: 0
FEVER: 0
DYSPHORIC MOOD: 1
SHORTNESS OF BREATH: 0

## 2024-02-01 NOTE — TELEPHONE ENCOUNTER
Central Prior Authorization Team   Phone: [phone]    PA Initiation    Medication: Lidocaine 5% patch  Insurance Company: Gene ([facility]) - Phone [phone] Fax [phone]  Pharmacy Filling the Rx: [facility] #[id number] - [place], [place] - [address] AT [facility] [address]  Filling Pharmacy Phone: [phone]  Filling Pharmacy Fax:    Start Date: 1/31/2024

## 2024-02-05 ENCOUNTER — TELEPHONE (OUTPATIENT)
Dept: FAMILY MEDICINE | Facility: CLINIC | Age: 64
End: 2024-02-05
Payer: MEDICARE

## 2024-02-05 NOTE — TELEPHONE ENCOUNTER
Prior Authorization Not Needed per Insurance    Medication: Lidocaine 5% patch  Insurance Company: Gene ([facility]) - Phone [phone] Fax [phone]  Expected CoPay:      Pharmacy Filling the Rx: [id number] - [place], [place] - [address] AT [facility] [address]  Pharmacy Notified:Yes    Patient Notified:  No

## 2024-02-14 NOTE — TELEPHONE ENCOUNTER
Patient Quality Outreach    Patient is due for the following:   Breast Cancer Screening - Mammogram    Next Steps:   Schedule a Adult Preventative    Type of outreach:    Phone, left message for patient/parent to call back.    Next Steps:  Reach out within 90 days via [employer].    Max number of attempts reached: Yes. Will try again in 90 days if patient still on fail list.    Questions for provider review:    None           Andrea Behrend

## 2024-02-28 DIAGNOSIS — E03.8 OTHER SPECIFIED HYPOTHYROIDISM: ICD-10-CM

## 2024-02-28 RX ORDER — LEVOTHYROXINE SODIUM 50 MCG
TABLET ORAL
Qty: 90 TABLET | Refills: 2 | Status: SHIPPED | OUTPATIENT
Start: 2024-02-28 | End: 2024-04-11

## 2024-03-05 DIAGNOSIS — M41.9 SCOLIOSIS OF THORACOLUMBAR SPINE, UNSPECIFIED SCOLIOSIS TYPE: ICD-10-CM

## 2024-03-05 DIAGNOSIS — M54.50 CHRONIC LEFT-SIDED LOW BACK PAIN WITHOUT SCIATICA: ICD-10-CM

## 2024-03-05 DIAGNOSIS — G89.29 CHRONIC LEFT-SIDED LOW BACK PAIN WITHOUT SCIATICA: ICD-10-CM

## 2024-03-05 NOTE — TELEPHONE ENCOUNTER
Medication Question or Refill        What medication are you calling about (include dose and sig)?:   cyclobenzaprine (FLEXERIL) 5 MG tablet   methocarbamol (ROBAXIN) 500 MG tablet     Preferred Pharmacy:     [id number] - [place], [place] - [address] [place] [address] AT [facility] [address]  [address] [place] [address]  Phone: [phone] Fax: [phone]    Controlled Substance Agreement on file:   CSA -- Patient Level:    CSA: None found at the patient level.       Who prescribed the medication?: PCP    Do you need a refill? Yes    When did you use the medication last? NA    Patient offered an appointment? No    Do you have any questions or concerns?  Yes: Pt want to inform PCP that zoloft isn't working for her, pt has a reaction to it.

## 2024-03-07 RX ORDER — METHOCARBAMOL 500 MG/1
500 TABLET, FILM COATED ORAL 4 TIMES DAILY PRN
Qty: 60 TABLET | Refills: 0 | Status: SHIPPED | OUTPATIENT
Start: 2024-03-07 | End: 2024-04-05

## 2024-03-07 RX ORDER — CYCLOBENZAPRINE HCL 5 MG
5 TABLET ORAL
Qty: 30 TABLET | Refills: 0 | OUTPATIENT
Start: 2024-03-07

## 2024-03-07 NOTE — TELEPHONE ENCOUNTER
Requesting medication update as she has two pills left.   cyclobenzaprine (FLEXERIL) 5 MG tablet      Preferred Pharmacy:      [facility] #[id number] - [place], [place] - [address] AT [address]  [address] [place] [address]  Phone: [phone] Fax: [phone]    She was able to  methocarbamol already.

## 2024-03-08 RX ORDER — CYCLOBENZAPRINE HCL 5 MG
5 TABLET ORAL
Qty: 30 TABLET | Refills: 0 | OUTPATIENT
Start: 2024-03-08

## 2024-03-08 NOTE — TELEPHONE ENCOUNTER
Patient is calling back for an update. Relayed to patient that cyclobenzaprine (FLEXERIL) 5 MG tablet was refused. Patient is out of medication, needing a refill.

## 2024-03-08 NOTE — TELEPHONE ENCOUNTER
She should not be taking cyclobenzaprine and methocarbamol at the same time - she needs to be on one or the other but not both.  These are both muscle relaxers.

## 2024-03-12 ENCOUNTER — NURSE TRIAGE (OUTPATIENT)
Dept: NURSING | Facility: CLINIC | Age: 64
End: 2024-03-12
Payer: MEDICARE

## 2024-03-12 NOTE — TELEPHONE ENCOUNTER
Left message to call back and ask to speak with an available triage nurse.    SOLIS GarciaN, RN-BC  [facility]

## 2024-03-12 NOTE — TELEPHONE ENCOUNTER
Pt called on the phone again    I did read again that Dr Gutiérrez does not want her on both methocarbamol and cyclobenzaprine. She was not satisfied with this answer so phone visit given for her tomorrow with provider. I did tell pt this does not mean MD would prescribe the med but she can discuss with her'    Rebecca Lyman RN, BSN  [facility]

## 2024-03-12 NOTE — TELEPHONE ENCOUNTER
Nurse Triage SBAR    Situation:   Medicaion question    Background:   -Patient calling  -It is okay to call back and leave a detailed message at this number:  [phone]   -Hx of Chronic left-sided low back pain without sciatica (per patient this is due to scoliosis)    Assessment:   -was taking cyclobenzaprine and methocarbamol  -is now only taking cyclobenzaprine, which she wants as it with pain and depression  -takes CBD gummies now   -wants to be on both medications  -we reviewed the note from Dr. Espinoza:    -she states that when she was on both medications she felt like herself  -pain is 9/10 now  -tylenol and ibuprofen not helping    Recommendation: See in Office Today   -declines visit at this time   -patient states just wants the medication issues figured out   -patient states she can't afford a visit until next month, as has has a limited income and is unable to pay the co-pay    Patient:  -Call back with and questions, concerns, or any change in symptoms    Care team:  -do you have any other possible solutions for the patient (ex other medication options)? Please call patient back and advise at [phone]     ALFONSO GUTIERREZ RN 3/12/2024 1:39 PM     Reason for Disposition   SEVERE back pain (e.g., excruciating, unable to do any normal activities) and not improved after pain medicine and CARE ADVICE    Additional Information   Negative: SEVERE back pain of sudden onset and age > 60 years   Negative: Passed out (i.e., fainted, collapsed and was not responding)   Negative: Shock suspected (e.g., cold/pale/clammy skin, too weak to stand, low BP, rapid pulse)   Negative: Sounds like a life-threatening emergency to the triager   Negative: SEVERE abdominal pain (e.g., excruciating)   Negative: Abdominal pain and age > 60 years   Negative: Unable to urinate (or only a few drops) and bladder feels very full   Negative: Loss of bladder or bowel control (urine or bowel incontinence; wetting self,  leaking stool) of new-onset   Negative: Numbness (loss of sensation) in groin or rectal area   Negative: Pain radiates into groin, scrotum   Negative: Blood in urine (red, pink, or tea-colored)   Negative: Vomiting and pain over lower ribs of back (i.e., flank - kidney area)   Negative: Patient sounds very sick or weak to the triager   Negative: Fever > 100.4 F (38.0 C) and flank pain   Negative: Pain or burning with passing urine (urination)   Negative: Weakness of a leg or foot (e.g., unable to bear weight, dragging foot)    Protocols used: Back Pain-A-[place]

## 2024-03-13 ENCOUNTER — VIRTUAL VISIT (OUTPATIENT)
Dept: FAMILY MEDICINE | Facility: CLINIC | Age: 64
End: 2024-03-13
Payer: MEDICARE

## 2024-03-13 DIAGNOSIS — M54.50 CHRONIC LEFT-SIDED LOW BACK PAIN WITHOUT SCIATICA: ICD-10-CM

## 2024-03-13 DIAGNOSIS — G89.29 CHRONIC LEFT-SIDED LOW BACK PAIN WITHOUT SCIATICA: ICD-10-CM

## 2024-03-13 DIAGNOSIS — M41.9 SCOLIOSIS OF THORACOLUMBAR SPINE, UNSPECIFIED SCOLIOSIS TYPE: Primary | ICD-10-CM

## 2024-03-13 PROCEDURE — 99442 PR PHYSICIAN TELEPHONE EVALUATION 11-20 MIN: CPT | Mod: 93 | Performed by: FAMILY MEDICINE

## 2024-03-13 RX ORDER — CYCLOBENZAPRINE HCL 5 MG
5 TABLET ORAL
Qty: 30 TABLET | Refills: 0 | Status: SHIPPED | OUTPATIENT
Start: 2024-03-13 | End: 2024-04-11

## 2024-03-13 NOTE — PROGRESS NOTES
"Virginia is a 63 year old who is being evaluated via a billable telephone visit.    What phone number would you like to be contacted at? [phone]   How would you like to obtain your AVS? Claribel  Originating Location (pt. Location): Home    Distant Location (provider location):  On-site    1. Scoliosis of thoracolumbar spine, unspecified scoliosis type  2. Chronic left-sided low back pain without sciatica  This is a 62 yo female with significant back pain - xrays (in the last year) suggest severe scoliosis deformity, likely contributing to her pain syndrome.  She has apparently had two prescriptions for muscle relaxers:  Methocarbamol and Cyclobenzaprine.  I recently declined the Cyclobenzaprine over concerns for duplication of therapy.  She is evaluated today for the need for this therapy.  She is quite adamant that she takes one cyclobenzaprine at bedtime.  This has proven to be really helpful for her.  As well, she uses 1-2 Methocarbamol during the day.  I have agreed to refill the Cyclobenzaprine.   - Primary Care - Care Coordination Referral; Future  - cyclobenzaprine (FLEXERIL) 5 MG tablet; Take 1 tablet (5 mg) by mouth nightly as needed for muscle spasms  Dispense: 30 tablet; Refill: 0      Subjective   Virginia is a 63 year old, presenting for the following health issues:  Medication Request (Wants refill on Cyclobenzaprine)        3/13/2024     1:51 PM   Additional Questions   Roomed by Vonda     2:00  Calling about the Cyclobenzaprine -   Helps her at night - sleeps   Takes the Methocarbamol 3-4x/night -  The Methocarbamol - relaxes her back \"sometimes\" - takes it during the day - \"when I need it, for pain\"  Every morning, sometimes in the afternoon, when bad pain   Pain is from scoliosis - hasn't been able to find a doctor because doesn't drive  Can't get rides from [place] -     Takes it in morning, sometimes in afternoon - at night takes it when she gets up at night   Takes " "Cyclobenzaprine at night - then doesn't have pain during the day, \"I'm a different person\" -   Yesterday I was at a 9 yesterday -     Has a [employer] box, but hasn't completed it    2:14 pm        History of Present Illness       Back Pain:  She presents for follow up of back pain. Patient's back pain is a recurring problem.  Location of back pain:  Right lower back  Description of back pain: sharp  Back pain spreads: nowhere    Since patient first noticed back pain, pain is: always present, but gets better and worse  Does back pain interfere with her job:  Not applicable           Review of Systems   Constitutional:  Negative for chills and fever.   HENT:  Negative for ear pain and sore throat.    Eyes:  Negative for pain and visual disturbance.   Respiratory:  Negative for cough and shortness of breath.    Cardiovascular:  Negative for chest pain and palpitations.   Gastrointestinal:  Negative for abdominal pain and vomiting.   Genitourinary:  Negative for dysuria and hematuria.   Musculoskeletal:  Positive for back pain (chronic back pain - severe scoliosis). Negative for arthralgias.   Skin:  Negative for color change and rash.   Neurological:  Positive for seizures. Negative for syncope.   All other systems reviewed and are negative.          Objective           Vitals:  No vitals were obtained today due to virtual visit.    Physical Exam   General: Alert and no distress //Respiratory: No audible wheeze, cough, or shortness of breath // Psychiatric:  Appropriate affect, tone, and pace of words      No results found for any visits on 03/13/24.      Phone call duration: 14 minutes  Signed Electronically by: CONNOR TIWARI MD    "

## 2024-03-14 ASSESSMENT — ENCOUNTER SYMPTOMS
DYSURIA: 0
SHORTNESS OF BREATH: 0
SORE THROAT: 0
FEVER: 0
SEIZURES: 1
CHILLS: 0
HEMATURIA: 0
BACK PAIN: 1
COLOR CHANGE: 0
COUGH: 0
ARTHRALGIAS: 0
EYE PAIN: 0
VOMITING: 0
ABDOMINAL PAIN: 0
PALPITATIONS: 0

## 2024-03-15 ENCOUNTER — PATIENT OUTREACH (OUTPATIENT)
Dept: CARE COORDINATION | Facility: CLINIC | Age: 64
End: 2024-03-15
Payer: MEDICARE

## 2024-03-15 NOTE — Clinical Note
FYI* Unable to reach pt to discuss CCC Sent intro letter with [facility]W contact info and mail [employer] application

## 2024-03-15 NOTE — PROGRESS NOTES
Clinic Care Coordination Contact  [facility]/Voicemail    Clinical Data: Care Coordinator Outreach    Outreach Documentation Number of Outreach Attempt   3/15/2024   3:00 PM 1       Left message on patient's voicemail with call back information and requested return call.    Plan: Care Coordinator will try to reach patient again in 1-2 business days.    RINKU Fortune  [phone]  [facility]

## 2024-03-15 NOTE — LETTER
[facility] CARE COORDINATION  [address] [address]    March 18, 2024    Kerry Reed  [address]  [place] [address]      Dear Virginia,    I am a clinic community health worker who works with CONNOR TIWARI MD with the [facility]. I have been trying to reach you recently to introduce Clinic Care Coordination. Below is a description of clinic care coordination and how I can further assist you.       The clinic care coordination team is made up of a registered nurse, , financial resource worker and community health worker who understand the health care system. The goal of clinic care coordination is to help you manage your health and improve access to the health care system. Our team works alongside your provider to assist you in determining your health and social needs. We can help you obtain health care and community resources, providing you with necessary information and education. We can work with you through any barriers and develop a care plan that helps coordinate and strengthen the communication between you and your care team.  Our services are voluntary and are offered without charge to you personally.    Please feel free to contact me with any questions or concerns regarding care coordination and what we can offer.      We are focused on providing you with the highest-quality healthcare experience possible.    Sincerely,       Jonathan Chan  Community Health Worker  [facility] Care Coordination  [email or web address]  [email or web address]   Office: [phone]  Fax: [phone]

## 2024-03-18 NOTE — PROGRESS NOTES
3/18/2024  Clinic Care Coordination Contact  [facility]/Voicemail    Clinical Data: Care Coordinator Outreach    Outreach Documentation Number of Outreach Attempt   3/15/2024   3:00 PM 1   3/18/2024  10:45 AM 2       PCP referral:  Patient refuses to go to spine care for her back pain due to no transportation (patient doesn't drive; needs sister to help her - but sister doesn't drive either).    Clinical Data: Care Coordinator Outreach: Discuss CCC enrollment    Outreach attempted x 2.  Left message on patient's voicemail with call back information and requested return call.    Plan: Care Coordinator will send care coordination introduction letter with care coordinator contact information and explanation of care coordination services via mychart/mail.    Send intro letter with CHW contact info and mail [employer] application    Care Coordinator will do no further outreaches at this time.    Routed to PCP as JOHN Chan  Community Health Worker  [facility] Care Coordination  [email or web address]   Office: [phone]  Fax: [phone]

## 2024-03-19 NOTE — TELEPHONE ENCOUNTER
RN made 2nd  call to pt to relay provider message re: cyclobenzaprine (FLEXERIL) 5 MG tablet   methocarbamol (ROBAXIN) 500 MG tablet refill.     No answer. LM on   with instruction to call back and ask for triage nurse and let staff know this is a return call.      If pt  calls back, please relay provider message below.    Will try again.    Pepper SON RN  [facility]     Ada Espinoza MD Physician Signed3/8/2024      Duplicate therapy - already on Methocarbamol - can't take both.             Ada Espinoza MD Physician Signed3/8/2024      She should not be taking cyclobenzaprine and methocarbamol at the same time - she needs to be on one or the other but not both.  These are both muscle relaxers.

## 2024-03-21 NOTE — PROGRESS NOTES
3/21/2024  Clinic Care Coordination Contact  Care Team Conversations    Mail intro letter with CHW contact info and enclosed [employer] application.    Jonathan Chan  Community Health Worker  [facility]  Clinic Care Coordination  [email or web address]   Office: [phone]  Fax: [phone]

## 2024-04-05 DIAGNOSIS — M54.50 CHRONIC LEFT-SIDED LOW BACK PAIN WITHOUT SCIATICA: ICD-10-CM

## 2024-04-05 DIAGNOSIS — G89.29 CHRONIC LEFT-SIDED LOW BACK PAIN WITHOUT SCIATICA: ICD-10-CM

## 2024-04-05 RX ORDER — METHOCARBAMOL 500 MG/1
500 TABLET, FILM COATED ORAL 4 TIMES DAILY PRN
Qty: 60 TABLET | Refills: 0 | Status: SHIPPED | OUTPATIENT
Start: 2024-04-05 | End: 2024-05-16

## 2024-04-05 NOTE — TELEPHONE ENCOUNTER
Medication Question or Refill        What medication are you calling about (include dose and sig)?: methocarbamol (ROBAXIN) 500 MG tablet     Preferred Pharmacy:   [id number] - [place], [place] - [address] [place] [address] AT [facility] [address]  [address] [place] [address]  Phone: [phone] Fax: [phone]      Controlled Substance Agreement on file:   CSA -- Patient Level:    CSA: None found at the patient level.       Who prescribed the medication?: Ada Espinoza MD     Do you need a refill? Yes    When did you use the medication last? 04/05/2024    Patient offered an appointment? No    Do you have any questions or concerns?  Yes: Patient will be completely out of medication on 04/07. Patient is requesting 90 day supply.      Okay to leave a detailed message?: Yes at Cell number on file:    Telephone Information:   Mobile [phone]

## 2024-04-11 DIAGNOSIS — G89.29 CHRONIC LEFT-SIDED LOW BACK PAIN WITHOUT SCIATICA: ICD-10-CM

## 2024-04-11 DIAGNOSIS — E03.8 OTHER SPECIFIED HYPOTHYROIDISM: ICD-10-CM

## 2024-04-11 DIAGNOSIS — M54.50 CHRONIC LEFT-SIDED LOW BACK PAIN WITHOUT SCIATICA: ICD-10-CM

## 2024-04-11 DIAGNOSIS — M41.9 SCOLIOSIS OF THORACOLUMBAR SPINE, UNSPECIFIED SCOLIOSIS TYPE: ICD-10-CM

## 2024-04-11 NOTE — TELEPHONE ENCOUNTER
Medication Question or Refill        What medication are you calling about (include dose and sig)?: cyclobenzaprine (FLEXERIL) 5 MG tablet   SYNTHROID 50 MCG table     Preferred Pharmacy:  [id number] - [place], [place] - [address] [place] [address] AT [address]  [address] [place] [address]  Phone: [phone] Fax: [phone]      Controlled Substance Agreement on file:   CSA -- Patient Level:    CSA: None found at the patient level.       Who prescribed the medication?: PCP    Do you need a refill? Yes, pt called in to request a refill on her medication. Pt states that there has been some sort of confusion, she stated that she is suppose to be getting a 90 day supply on both medications SYNTHROID and cyclobenzaprinehe. She sated that when she went to  the SYNTHROID she was only given a 30 day supply by pharmacy which she states should be 90. Please send a new RX to pt pharmacy is applicable and call pt back if you have any questions. Pt states she is almost out.  Thanks!    When did you use the medication last? N/A    Patient offered an appointment? No    Do you have any questions or concerns?  No      Okay to leave a detailed message?: Yes at Home number on file [phone] (home)

## 2024-04-12 RX ORDER — CYCLOBENZAPRINE HCL 5 MG
5 TABLET ORAL
Qty: 30 TABLET | Refills: 0 | Status: SHIPPED | OUTPATIENT
Start: 2024-04-12 | End: 2024-05-16

## 2024-04-12 RX ORDER — LEVOTHYROXINE SODIUM 50 MCG
TABLET ORAL
Qty: 90 TABLET | Refills: 2 | Status: SHIPPED | OUTPATIENT
Start: 2024-04-12

## 2024-04-13 DIAGNOSIS — Z76.0 ENCOUNTER FOR MEDICATION REFILL: ICD-10-CM

## 2024-04-13 DIAGNOSIS — R06.02 SHORTNESS OF BREATH: ICD-10-CM

## 2024-04-16 RX ORDER — ALBUTEROL SULFATE 90 UG/1
2 AEROSOL, METERED RESPIRATORY (INHALATION) EVERY 4 HOURS PRN
Qty: 18 G | Refills: 1 | Status: SHIPPED | OUTPATIENT
Start: 2024-04-16 | End: 2024-08-12

## 2024-05-16 DIAGNOSIS — G89.29 CHRONIC LEFT-SIDED LOW BACK PAIN WITHOUT SCIATICA: ICD-10-CM

## 2024-05-16 DIAGNOSIS — M41.9 SCOLIOSIS OF THORACOLUMBAR SPINE, UNSPECIFIED SCOLIOSIS TYPE: ICD-10-CM

## 2024-05-16 DIAGNOSIS — G40.A09 NONINTRACTABLE ABSENCE EPILEPSY WITHOUT STATUS EPILEPTICUS (H): ICD-10-CM

## 2024-05-16 DIAGNOSIS — M54.50 CHRONIC LEFT-SIDED LOW BACK PAIN WITHOUT SCIATICA: ICD-10-CM

## 2024-05-16 RX ORDER — CYCLOBENZAPRINE HCL 5 MG
5 TABLET ORAL
Qty: 30 TABLET | Refills: 0 | Status: SHIPPED | OUTPATIENT
Start: 2024-05-16 | End: 2024-06-27

## 2024-05-16 RX ORDER — METHOCARBAMOL 500 MG/1
500 TABLET, FILM COATED ORAL 4 TIMES DAILY PRN
Qty: 60 TABLET | Refills: 0 | Status: SHIPPED | OUTPATIENT
Start: 2024-05-16 | End: 2024-06-27

## 2024-05-16 RX ORDER — LEVETIRACETAM 500 MG
1000 TABLET, EXTENDED RELEASE 24 HR ORAL 2 TIMES DAILY
Qty: 180 TABLET | Refills: 3 | Status: SHIPPED | OUTPATIENT
Start: 2024-05-16 | End: 2024-09-16

## 2024-05-16 NOTE — TELEPHONE ENCOUNTER
Medication Question or Refill        What medication are you calling about (include dose and sig)?:   methocarbamol (ROBAXIN) 500 MG tablet   cyclobenzaprine (FLEXERIL) 5 MG tablet     Preferred Pharmacy:   [facility] #[id number] - [place], [place] - [address] [place] [address] AT [address]  [address] [place] [address]  Phone: [phone] Fax: [phone]      Controlled Substance Agreement on file:   CSA -- Patient Level:    CSA: None found at the patient level.       Who prescribed the medication?: Ada Espinoza MD     Do you need a refill? Yes    When did you use the medication last? 05/16/2024    Patient offered an appointment? No    Do you have any questions or concerns?  Yes: patient only has 4 capsules left of methocarbamol. Would like 90 day supply of both medication.      Okay to leave a detailed message?: Yes at Cell number on file:    Telephone Information:   Mobile [phone]

## 2024-05-16 NOTE — TELEPHONE ENCOUNTER
Medication Question or Refill        What medication are you calling about (include dose and sig)?: KEPPRA  MG 24 hr tablet     Preferred Pharmacy:    [facility] #[id number] - [place], [place] - [address] [place] [address]  Phone: [phone] Fax: [phone]      Controlled Substance Agreement on file:   CSA -- Patient Level:    CSA: None found at the patient level.       Who prescribed the medication?: Ada Espinoza MD     Do you need a refill? Yes    When did you use the medication last? 05/16/2024    Patient offered an appointment? No    Do you have any questions or concerns?  No      Okay to leave a detailed message?: Yes at Cell number on file:    Telephone Information:   Mobile [phone]

## 2024-05-21 ENCOUNTER — TELEPHONE (OUTPATIENT)
Dept: FAMILY MEDICINE | Facility: CLINIC | Age: 64
End: 2024-05-21
Payer: MEDICARE

## 2024-05-21 NOTE — TELEPHONE ENCOUNTER
Patient Quality Outreach    Patient is due for the following:   Breast Cancer Screening - Mammogram    Next Steps:   Schedule a Adult Preventative    Type of outreach:    Patient has not scheduled; have tried to reach out to patient twice.  Will follow up in 90 days.      Questions for provider review:    None           Andrea Behrend

## 2024-05-26 DIAGNOSIS — R60.0 LOWER EXTREMITY EDEMA: ICD-10-CM

## 2024-05-29 RX ORDER — TRIAMTERENE AND HYDROCHLOROTHIAZIDE 37.5; 25 MG/1; MG/1
1 CAPSULE ORAL DAILY PRN
Qty: 90 CAPSULE | Refills: 1 | Status: SHIPPED | OUTPATIENT
Start: 2024-05-29

## 2024-06-27 DIAGNOSIS — M41.9 SCOLIOSIS OF THORACOLUMBAR SPINE, UNSPECIFIED SCOLIOSIS TYPE: ICD-10-CM

## 2024-06-27 DIAGNOSIS — M54.50 CHRONIC LEFT-SIDED LOW BACK PAIN WITHOUT SCIATICA: ICD-10-CM

## 2024-06-27 DIAGNOSIS — G89.29 CHRONIC LEFT-SIDED LOW BACK PAIN WITHOUT SCIATICA: ICD-10-CM

## 2024-06-27 RX ORDER — CYCLOBENZAPRINE HCL 5 MG
5 TABLET ORAL
Qty: 30 TABLET | Refills: 0 | Status: SHIPPED | OUTPATIENT
Start: 2024-06-27 | End: 2024-08-12

## 2024-06-27 RX ORDER — METHOCARBAMOL 500 MG/1
500 TABLET, FILM COATED ORAL 4 TIMES DAILY PRN
Qty: 60 TABLET | Refills: 0 | Status: SHIPPED | OUTPATIENT
Start: 2024-06-27 | End: 2024-08-12

## 2024-06-27 NOTE — TELEPHONE ENCOUNTER
Medication Question or Refill    Contacts       Contact Date/Time Type Contact Phone/Fax    06/27/2024 09:05 AM CDT Phone (Incoming) Kerry Reed (Self) [phone] (M)            What medication are you calling about (include dose and sig)?: methocarbamol (ROBAXIN) 500 MG tablet   cyclobenzaprine (FLEXERIL) 5 MG tablet     Preferred Pharmacy:   [facility] #[id number] - [place], [place] - [address]  [address] [place] [address]  Phone: [phone] Fax: [phone]          Controlled Substance Agreement on file:   CSA -- Patient Level:    CSA: None found at the patient level.       Who prescribed the medication?: pcp    Do you need a refill? Yes    When did you use the medication last? today    Patient offered an appointment? No    Do you have any questions or concerns?  Yes: wanting more refills so she doesn't have to call every month      Okay to leave a detailed message?: Yes at Cell number on file:    Telephone Information:   Mobile [phone]

## 2024-07-16 ENCOUNTER — TELEPHONE (OUTPATIENT)
Dept: FAMILY MEDICINE | Facility: CLINIC | Age: 64
End: 2024-07-16

## 2024-07-16 NOTE — LETTER
July 23, 2024      Kerry Reed  [address] [place] [address]        Dear GENEVIEVE Payne [facility] has made several attempts to reach you by phone. Dr Gutiérrez recommends for diarrhea to take probiotics, Imodium, and adequate fluid intake to prevent dehydration. Please keep scheduled appointment on 8/5/24 at 10:40 with Dr Gutiérrez. If questions or concerns, please call clinic back at [phone]. Thank you      Sincerely,        MD and M TK [facility] RN

## 2024-07-17 NOTE — TELEPHONE ENCOUNTER
First attempt: left voicemail requesting return call.     If patient calls back, please reinforce the following recommendation for her diarrhea: probiotics, Imodium, and adequate fluid intake to prevent dehydration. No further recommendations from Dr. Gutiérrez.

## 2024-07-19 NOTE — TELEPHONE ENCOUNTER
Second attempt: left voicemail requesting return call.      If patient calls back, please reinforce the following recommendation for her diarrhea: probiotics, Imodium, and adequate fluid intake to prevent dehydration. No further recommendations from Dr. Gutiérrez.     Patient has follow up appointment with Dr. Gutiérrez on Tuesday 7/23/24.

## 2024-07-23 NOTE — TELEPHONE ENCOUNTER
RN made 3rd attempt: left voicemail requesting return call.   Patient cancelled follow up appointment with Dr. Gutiérrez on Tuesday 7/23/24.  Per protocol, letter mailed to patient    If patient calls back, please reinforce the following recommendation for her diarrhea: probiotics, Imodium, and adequate fluid intake to prevent dehydration. No further recommendations from Dr. Gutiérrez.     Zee Oh RN  [facility]

## 2024-08-12 ENCOUNTER — OFFICE VISIT (OUTPATIENT)
Dept: FAMILY MEDICINE | Facility: CLINIC | Age: 64
End: 2024-08-12
Payer: MEDICARE

## 2024-08-12 VITALS
HEART RATE: 76 BPM | WEIGHT: 123 LBS | BODY MASS INDEX: 24.8 KG/M2 | HEIGHT: 59 IN | DIASTOLIC BLOOD PRESSURE: 77 MMHG | TEMPERATURE: 97.9 F | OXYGEN SATURATION: 98 % | SYSTOLIC BLOOD PRESSURE: 121 MMHG | RESPIRATION RATE: 16 BRPM

## 2024-08-12 DIAGNOSIS — Z12.11 SCREEN FOR COLON CANCER: ICD-10-CM

## 2024-08-12 DIAGNOSIS — R06.02 SHORTNESS OF BREATH: ICD-10-CM

## 2024-08-12 DIAGNOSIS — Z09 HOSPITAL DISCHARGE FOLLOW-UP: ICD-10-CM

## 2024-08-12 DIAGNOSIS — M41.9 SCOLIOSIS OF THORACOLUMBAR SPINE, UNSPECIFIED SCOLIOSIS TYPE: ICD-10-CM

## 2024-08-12 DIAGNOSIS — M54.50 CHRONIC LEFT-SIDED LOW BACK PAIN WITHOUT SCIATICA: ICD-10-CM

## 2024-08-12 DIAGNOSIS — Z76.0 ENCOUNTER FOR MEDICATION REFILL: ICD-10-CM

## 2024-08-12 DIAGNOSIS — Z98.890 HISTORY OF BILIARY STENT INSERTION: Primary | ICD-10-CM

## 2024-08-12 DIAGNOSIS — R63.4 UNINTENDED WEIGHT LOSS: ICD-10-CM

## 2024-08-12 DIAGNOSIS — G40.A09 NONINTRACTABLE ABSENCE EPILEPSY WITHOUT STATUS EPILEPTICUS (H): ICD-10-CM

## 2024-08-12 DIAGNOSIS — G89.29 CHRONIC LEFT-SIDED LOW BACK PAIN WITHOUT SCIATICA: ICD-10-CM

## 2024-08-12 DIAGNOSIS — Z12.4 CERVICAL CANCER SCREENING: ICD-10-CM

## 2024-08-12 DIAGNOSIS — E03.8 OTHER SPECIFIED HYPOTHYROIDISM: ICD-10-CM

## 2024-08-12 DIAGNOSIS — Z29.11 NEED FOR VACCINATION AGAINST RESPIRATORY SYNCYTIAL VIRUS: ICD-10-CM

## 2024-08-12 DIAGNOSIS — R73.9 ELEVATED RANDOM BLOOD GLUCOSE LEVEL: ICD-10-CM

## 2024-08-12 DIAGNOSIS — Z12.31 VISIT FOR SCREENING MAMMOGRAM: ICD-10-CM

## 2024-08-12 DIAGNOSIS — Z59.71 INSURANCE COVERAGE PROBLEMS: ICD-10-CM

## 2024-08-12 DIAGNOSIS — Z23 NEED FOR SHINGLES VACCINE: ICD-10-CM

## 2024-08-12 LAB
ALBUMIN SERPL BCG-MCNC: 4.1 G/DL (ref 3.5–5.2)
ALP SERPL-CCNC: 110 U/L (ref 40–150)
ALT SERPL W P-5'-P-CCNC: 18 U/L (ref 0–50)
ANION GAP SERPL CALCULATED.3IONS-SCNC: 10 MMOL/L (ref 7–15)
AST SERPL W P-5'-P-CCNC: 26 U/L (ref 0–45)
BILIRUB SERPL-MCNC: 0.2 MG/DL
BUN SERPL-MCNC: 13.9 MG/DL (ref 8–23)
CALCIUM SERPL-MCNC: 8.8 MG/DL (ref 8.8–10.4)
CARBAMAZEPINE SERPL-MCNC: 9.7 UG/ML (ref 4–12)
CHLORIDE SERPL-SCNC: 98 MMOL/L (ref 98–107)
CREAT SERPL-MCNC: 0.97 MG/DL (ref 0.51–0.95)
EGFRCR SERPLBLD CKD-EPI 2021: 65 ML/MIN/1.73M2
GLUCOSE SERPL-MCNC: 109 MG/DL (ref 70–99)
HBA1C MFR BLD: 5.1 % (ref 0–5.6)
HCO3 SERPL-SCNC: 29 MMOL/L (ref 22–29)
LEVETIRACETAM SERPL-MCNC: 51.1 ΜG/ML (ref 10–40)
POTASSIUM SERPL-SCNC: 4.2 MMOL/L (ref 3.4–5.3)
PROT SERPL-MCNC: 7.2 G/DL (ref 6.4–8.3)
SODIUM SERPL-SCNC: 137 MMOL/L (ref 135–145)
TSH SERPL DL<=0.005 MIU/L-ACNC: 3.12 UIU/ML (ref 0.3–4.2)

## 2024-08-12 PROCEDURE — 80177 DRUG SCRN QUAN LEVETIRACETAM: CPT | Performed by: FAMILY MEDICINE

## 2024-08-12 PROCEDURE — 99214 OFFICE O/P EST MOD 30 MIN: CPT | Performed by: FAMILY MEDICINE

## 2024-08-12 PROCEDURE — 36415 COLL VENOUS BLD VENIPUNCTURE: CPT | Performed by: FAMILY MEDICINE

## 2024-08-12 PROCEDURE — 80053 COMPREHEN METABOLIC PANEL: CPT | Performed by: FAMILY MEDICINE

## 2024-08-12 PROCEDURE — 80156 ASSAY CARBAMAZEPINE TOTAL: CPT | Performed by: FAMILY MEDICINE

## 2024-08-12 PROCEDURE — 83036 HEMOGLOBIN GLYCOSYLATED A1C: CPT | Performed by: FAMILY MEDICINE

## 2024-08-12 PROCEDURE — 84443 ASSAY THYROID STIM HORMONE: CPT | Performed by: FAMILY MEDICINE

## 2024-08-12 RX ORDER — METHOCARBAMOL 500 MG/1
500 TABLET, FILM COATED ORAL 4 TIMES DAILY PRN
Qty: 60 TABLET | Refills: 0 | Status: SHIPPED | OUTPATIENT
Start: 2024-08-12 | End: 2024-09-12

## 2024-08-12 RX ORDER — GABAPENTIN 100 MG/1
100 CAPSULE ORAL 3 TIMES DAILY
Qty: 90 CAPSULE | Refills: 3 | Status: SHIPPED | OUTPATIENT
Start: 2024-08-12

## 2024-08-12 RX ORDER — ALBUTEROL SULFATE 90 UG/1
2 AEROSOL, METERED RESPIRATORY (INHALATION) EVERY 4 HOURS PRN
Qty: 18 G | Refills: 1 | Status: SHIPPED | OUTPATIENT
Start: 2024-08-12

## 2024-08-12 RX ORDER — METHOCARBAMOL 500 MG/1
500 TABLET, FILM COATED ORAL 4 TIMES DAILY PRN
Qty: 60 TABLET | Refills: 0 | Status: CANCELLED | OUTPATIENT
Start: 2024-08-12

## 2024-08-12 RX ORDER — CYCLOBENZAPRINE HCL 5 MG
5 TABLET ORAL
Qty: 30 TABLET | Refills: 0 | Status: SHIPPED | OUTPATIENT
Start: 2024-08-12 | End: 2024-09-12

## 2024-08-12 ASSESSMENT — PAIN SCALES - GENERAL: PAINLEVEL: EXTREME PAIN (8)

## 2024-08-12 NOTE — PROGRESS NOTES
1. History of biliary stent insertion  2. Shortness of breath  3. Hospital discharge follow-up  This is a 65 yo female with recent hospitalization at [facility] - 7/11 - 7/14  I have reviewed available hospital records, consults, notes, discharge summary as well as imaging and lab results.  In addition I have reviewed her medication list and made any adjustments as indicated in orders.      Patient was hospitalized for shortness of breath - this has improved.  She continues to smoke /vape.  Discussed risks.    Will refill her Albuterol inhaler.  As well, while she was there, they discovered she had never had her biliary stent removed (we had discussed this on multiple occasions and she never followed through).  They removed the stent and replaced it.  She has follow up with Gi for this.  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    - albuterol (PROAIR HFA/PROVENTIL HFA/VENTOLIN HFA) 108 (90 Base) MCG/ACT inhaler; Inhale 2 puffs into the lungs every 4 hours as needed for wheezing  Dispense: 18 g; Refill: 1      4. Chronic left-sided low back pain without sciatica  Patient has chronic left - sided low back pain without sciatic - she does use both Cyclobenzaprine and Methocarbamol - she has her own dosing intervals - but takes them sensibly.  Will refill -   - cyclobenzaprine (FLEXERIL) 5 MG tablet; Take 1 tablet (5 mg) by mouth nightly as needed for muscle spasms  Dispense: 30 tablet; Refill: 0  - methocarbamol (ROBAXIN) 500 MG tablet; Take 1 tablet (500 mg) by mouth 4 times daily as needed for muscle spasms  Dispense: 60 tablet; Refill: 0    5. Scoliosis of thoracolumbar spine, unspecified scoliosis type  Patient has significant scoliosis of thoracolumbar spine - this contributes to back pain/spasm.  Uses Gabapentin as well.    - cyclobenzaprine (FLEXERIL) 5 MG tablet; Take 1 tablet (5 mg) by mouth nightly as needed for muscle spasms  Dispense: 30 tablet; Refill: 0  - gabapentin  (NEURONTIN) 100 MG capsule; Take 1 capsule (100 mg) by mouth 3 times daily  Dispense: 90 capsule; Refill: 3    6. Other specified hypothyroidism  Patient takes Synthroid (does not tolerate generic levothyroxine)   - TSH WITH FREE T4 REFLEX; Future  - TSH WITH FREE T4 REFLEX    7. 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    - Keppra (Levetiracetam) Level; Future  - Carbamazepine total; Future  - Keppra (Levetiracetam) Level  - Carbamazepine total    8. Unintended weight loss  Patient does note some weight loss - hasn't been trying to lose weight -   - Comprehensive metabolic panel (BMP + Alb, Alk Phos, ALT, AST, Total. Bili, TP); Future  - Comprehensive metabolic panel (BMP + Alb, Alk Phos, ALT, AST, Total. Bili, TP)    9. Elevated random blood glucose level  Elevated glucose on testing - will check A1c to rule out diabetes   - Hemoglobin A1c; Future  - Comprehensive metabolic panel (BMP + Alb, Alk Phos, ALT, AST, Total. Bili, TP); Future  - Hemoglobin A1c  - Comprehensive metabolic panel (BMP + Alb, Alk Phos, ALT, AST, Total. Bili, TP)    10. Encounter for medication refill  Patient desires some medication refills -   - albuterol (PROAIR HFA/PROVENTIL HFA/VENTOLIN HFA) 108 (90 Base) MCG/ACT inhaler; Inhale 2 puffs into the lungs every 4 hours as needed for wheezing  Dispense: 18 g; Refill: 1    11. Insurance coverage problems  Patient is having difficulty with her insurance - would like some help with this - discussed - offered help from our care coordinators - she would like that.    - Primary Care - Care Coordination Referral; Future    12. Need for shingles vaccine  13. Need for vaccination against respiratory syncytial virus  14. Visit for screening mammogram  15. Screen for colon cancer  16. Cervical cancer screening  Reviewed health maintenance - declines         Arley Payne is a 64 year old,  presenting for the following health issues:  Hospital F/U        8/12/2024    11:14 AM   Additional Questions   Roomed by Sandra   Accompanied by sister     Finally got her gall bladder stent out   Was in [place] - for wheezing -     Quit vaping -   Pain level is going up                Hospital Follow-up Visit:    Hospital/Nursing Home/IP Rehab Facility:  [facility]   Date of Admission: 07/24  Date of Discharge: 07/28/24  Reason(s) for Admission: chest pain and diff breathing  Was the patient in the ICU or did the patient experience delirium during hospitalization?  No  Do you have any other stressors you would like to discuss with your provider? No    Problems taking medications regularly:  None  Medication changes since discharge: None  Problems adhering to non-medication therapy:  None    Summary of hospitalization:  CareEverywhere information obtained and reviewed  Diagnostic Tests/Treatments reviewed.  Follow up needed: none  Other Healthcare Providers Involved in Patient s Care:         Specialist appointment - GI, pulmonary   Update since discharge: improved.         Plan of care communicated with patient and family             Review of Systems   Constitutional:  Negative for chills and fever.   HENT:  Negative for ear pain and sore throat.    Eyes:  Negative for pain and visual disturbance.   Respiratory:  Positive for shortness of breath (improved). Negative for cough.    Cardiovascular:  Negative for chest pain and palpitations.   Gastrointestinal:  Positive for abdominal pain (occasional). Negative for vomiting.   Genitourinary:  Negative for dysuria and hematuria.   Musculoskeletal:  Negative for arthralgias and back pain.   Skin:  Negative for color change and rash.   Neurological:  Positive for seizures (absence). Negative for syncope.   All other systems reviewed and are negative.          Objective    /77 (BP Location: Left arm, Patient Position: Sitting, Cuff Size: Adult Regular)    "Pulse 76   Temp 97.9  F (36.6  C) (Oral)   Resp 16   Ht 1.51 m (4' 11.45\")   Wt 55.8 kg (123 lb)   LMP  (LMP Unknown)   SpO2 98%   BMI 24.47 kg/m    Body mass index is 24.47 kg/m .  Physical Exam  Vitals reviewed.   Constitutional:       General: She is not in acute distress.     Appearance: Normal appearance.   HENT:      Head: Normocephalic.      Right Ear: Tympanic membrane, ear canal and external ear normal.      Left Ear: Tympanic membrane, ear canal and external ear normal.      Nose: Nose normal.      Mouth/Throat:      Mouth: Mucous membranes are moist.      Pharynx: No posterior oropharyngeal erythema.   Eyes:      Extraocular Movements: Extraocular movements intact.      Conjunctiva/sclera: Conjunctivae normal.      Pupils: Pupils are equal, round, and reactive to light.   Cardiovascular:      Rate and Rhythm: Normal rate and regular rhythm.      Pulses: Normal pulses.      Heart sounds: Normal heart sounds. No murmur heard.  Pulmonary:      Effort: Pulmonary effort is normal.      Breath sounds: Normal breath sounds.   Abdominal:      Palpations: Abdomen is soft. There is no mass.      Tenderness: There is abdominal tenderness (mild). There is no guarding or rebound.   Musculoskeletal:         General: No deformity. Normal range of motion.      Cervical back: Normal range of motion and neck supple.   Lymphadenopathy:      Cervical: No cervical adenopathy.   Skin:     General: Skin is warm and dry.   Neurological:      General: No focal deficit present.      Mental Status: She is alert.   Psychiatric:         Mood and Affect: Mood normal.         Behavior: Behavior normal.            Results for orders placed or performed in visit on 08/12/24   TSH WITH FREE T4 REFLEX     Status: Normal   Result Value Ref Range    TSH 3.12 0.30 - 4.20 uIU/mL   Hemoglobin A1c     Status: Normal   Result Value Ref Range    Hemoglobin A1C 5.1 0.0 - 5.6 %   Comprehensive metabolic panel (BMP + Alb, Alk Phos, ALT, AST, " Total. Bili, TP)     Status: Abnormal   Result Value Ref Range    Sodium 137 135 - 145 mmol/L    Potassium 4.2 3.4 - 5.3 mmol/L    Carbon Dioxide (CO2) 29 22 - 29 mmol/L    Anion Gap 10 7 - 15 mmol/L    Urea Nitrogen 13.9 8.0 - 23.0 mg/dL    Creatinine 0.97 (H) 0.51 - 0.95 mg/dL    GFR Estimate 65 >60 mL/min/1.73m2    Calcium 8.8 8.8 - 10.4 mg/dL    Chloride 98 98 - 107 mmol/L    Glucose 109 (H) 70 - 99 mg/dL    Alkaline Phosphatase 110 40 - 150 U/L    AST 26 0 - 45 U/L    ALT 18 0 - 50 U/L    Protein Total 7.2 6.4 - 8.3 g/dL    Albumin 4.1 3.5 - 5.2 g/dL    Bilirubin Total 0.2 <=1.2 mg/dL   Keppra (Levetiracetam) Level     Status: Abnormal   Result Value Ref Range    Keppra (Levetiracetam) Level 51.1 (H) 10.0 - 40.0  g/mL   Carbamazepine total     Status: Normal   Result Value Ref Range    Carbamazepine 9.7 4.0 - 12.0 ug/mL         Prior to immunization administration, verified patients identity using patient s name and date of birth. Please see Immunization Activity for additional information.     Screening Questionnaire for Adult Immunization    Are you sick today?   No   Do you have allergies to medications, food, a vaccine component or latex?   Yes   Have you ever had a serious reaction after receiving a vaccination?   No   Do you have a long-term health problem with heart, lung, kidney, or metabolic disease (e.g., diabetes), asthma, a blood disorder, no spleen, complement component deficiency, a cochlear implant, or a spinal fluid leak?  Are you on long-term aspirin therapy?   No   Do you have cancer, leukemia, HIV/AIDS, or any other immune system problem?   No   Do you have a parent, brother, or sister with an immune system problem?   No   In the past 3 months, have you taken medications that affect  your immune system, such as prednisone, other steroids, or anticancer drugs; drugs for the treatment of rheumatoid arthritis, Crohn s disease, or psoriasis; or have you had radiation treatments?   Yes   Have you  had a seizure, or a brain or other nervous system problem?   Yes   During the past year, have you received a transfusion of blood or blood    products, or been given immune (gamma) globulin or antiviral drug?   No   For women: Are you pregnant or is there a chance you could become       pregnant during the next month?   No   Have you received any vaccinations in the past 4 weeks?   No     Immunization questionnaire was positive for at least one answer.  Notified .      Patient instructed to remain in clinic for 15 minutes afterwards, and to report any adverse reactions.     Screening performed by Sandra Leonard MA on 8/12/2024 at 11:23 AM.       Signed Electronically by: CONNOR TIWARI MD

## 2024-08-12 NOTE — LETTER
August 20, 2024      [place] MARIANO Derek  [address] [place] [address]        Dear ,    We are writing to inform you of your test results.    Your keppra (levetiracetam) level is a little high - it looks like you take 2 tablets twice a day.  Why don't you try taking 1 in the morning, 2 in the evening and recheck levels in a month.      Other labs are okay.      Resulted Orders   TSH WITH FREE T4 REFLEX   Result Value Ref Range    TSH 3.12 0.30 - 4.20 uIU/mL   Hemoglobin A1c   Result Value Ref Range    Hemoglobin A1C 5.1 0.0 - 5.6 %      Comment:      Normal <5.7%   Prediabetes 5.7-6.4%    Diabetes 6.5% or higher     Note: Adopted from ADA consensus guidelines.   Comprehensive metabolic panel (BMP + Alb, Alk Phos, ALT, AST, Total. Bili, TP)   Result Value Ref Range    Sodium 137 135 - 145 mmol/L    Potassium 4.2 3.4 - 5.3 mmol/L    Carbon Dioxide (CO2) 29 22 - 29 mmol/L    Anion Gap 10 7 - 15 mmol/L    Urea Nitrogen 13.9 8.0 - 23.0 mg/dL    Creatinine 0.97 (H) 0.51 - 0.95 mg/dL    GFR Estimate 65 >60 mL/min/1.73m2      Comment:      eGFR calculated using 2021 CKD-EPI equation.    Calcium 8.8 8.8 - 10.4 mg/dL      Comment:      Reference intervals for this test were updated on 7/16/2024 to reflect our healthy population more accurately. There may be differences in the flagging of prior results with similar values performed with this method. Those prior results can be interpreted in the context of the updated reference intervals.    Chloride 98 98 - 107 mmol/L    Glucose 109 (H) 70 - 99 mg/dL    Alkaline Phosphatase 110 40 - 150 U/L    AST 26 0 - 45 U/L    ALT 18 0 - 50 U/L    Protein Total 7.2 6.4 - 8.3 g/dL    Albumin 4.1 3.5 - 5.2 g/dL    Bilirubin Total 0.2 <=1.2 mg/dL   Keppra (Levetiracetam) Level   Result Value Ref Range    Keppra (Levetiracetam) Level 51.1 (H) 10.0 - 40.0  g/mL   Carbamazepine total   Result Value Ref Range    Carbamazepine 9.7 4.0 - 12.0 ug/mL       If you have any questions  or concerns, please call the clinic at the number listed above.       Sincerely,      Ada Espinoza MD

## 2024-08-13 ENCOUNTER — PATIENT OUTREACH (OUTPATIENT)
Dept: CARE COORDINATION | Facility: CLINIC | Age: 64
End: 2024-08-13
Payer: MEDICARE

## 2024-08-13 NOTE — PROGRESS NOTES
"Clinic Care Coordination Contact:  [facility]/Voicemail    Reason: CCC CHW Initial Outreach Called- Referral to Clinic Care Coordination (CCC) Service  Referral provider: Ada Espinoza MD   Note/comments attached per referral reviewed;   \"Note:  Wants info on insurances.\"    Clinical Data: Care Coordinator Outreach:    Outreach Documentation Number of Outreach Attempt   8/13/2024   9:43 AM 1     Attempted #1:  Patient is refer to Care Coordination Service. CHW attempted to connect with patient and no answer. Left a general voice message on patient's voicemail with call back information and requested return call.    Plan:   Care Coordinator will try to reach patient again in 1-2 business days.              "

## 2024-08-14 ENCOUNTER — PATIENT OUTREACH (OUTPATIENT)
Dept: CARE COORDINATION | Facility: CLINIC | Age: 64
End: 2024-08-14
Payer: MEDICARE

## 2024-08-14 NOTE — LETTER
[facility] CARE COORDINATION  [address] [facility] [address]     August 14, 2024    Kerry Reed  [address]  [place] [address]      Dear [place],    I am a clinic community health worker who works with CONNOR TIWARI MD with the [facility]. I have been trying to reach you recently to introduce Clinic Care Coordination. Below is a description of clinic care coordination and how I can further assist you.       The clinic care coordination team is made up of a registered nurse, , financial resource worker and community health worker who understand the health care system. The goal of clinic care coordination is to help you manage your health and improve access to the health care system. Our team works alongside your provider to assist you in determining your health and social needs. We can help you obtain health care and community resources, providing you with necessary information and education. We can work with you through any barriers and develop a care plan that helps coordinate and strengthen the communication between you and your care team.  Our services are voluntary and are offered without charge to you personally.    Please feel free to contact me with any questions or concerns regarding care coordination and what we can offer.      We are focused on providing you with the highest-quality healthcare experience possible. Thank you.        Sincerely,     Renea Martinez Community Health Worker (CHW Certified)  [facility] Care Coordination  Office: [phone]  Clinic: [phone]

## 2024-08-14 NOTE — PROGRESS NOTES
"Clinic Care Coordination Contact:  [facility]/Voicemail    Reason: CCC CHW Initial Outreach Called- Referral to Clinic Care Coordination ([facility]) Service  Referral provider: Ada Espinoza MD   Note/comments attached per referral reviewed;   \"Note:  Wants info on insurances.\"    Clinical Data: Care Coordinator Outreach:    Outreach Documentation Number of Outreach Attempt   8/13/2024   9:43 AM 1     Attempted #2:   Patient is refer to Care Coordination Service. CHW attempted to connect with the patient regard to reason above and no answer. Left a general voice message on patient's voicemail with call back information and requested return call.    Plan:   -Care Coordinator will send care coordination introduction letter with care coordinator contact information and explanation of care coordination services via mail. Referral for this patient is closed at this time. Care Coordinator will do no further outreaches at this time.  -Message route to update the referral physician above.         Renea Martinez Community Health Worker (CHW Certified)  [facility] Care Coordination  Office: [phone]  Clinic: [phone]           "

## 2024-08-15 NOTE — TELEPHONE ENCOUNTER
"Patient and sister were very \"excited\" to work with care coordination when we discussed this.  Will check in , in the future.    "

## 2024-08-20 NOTE — TELEPHONE ENCOUNTER
Patient called back to speak with CCC team.     Patient would like a call back asap in regards to potentially switching insurance, help finding medication coverage and obtaining medical assistance. Per patient, 10AM is the best time to reach patient.

## 2024-08-21 ENCOUNTER — PATIENT OUTREACH (OUTPATIENT)
Dept: CARE COORDINATION | Facility: CLINIC | Age: 64
End: 2024-08-21
Payer: MEDICARE

## 2024-08-21 DIAGNOSIS — Z71.89 OTHER SPECIFIED COUNSELING: Primary | Chronic | ICD-10-CM

## 2024-08-21 NOTE — PROGRESS NOTES
Clinic Care Coordination Contact    CC SW placed an FRW referral for pt get assistance with insurance.    NADINE Miranda   Social Work Care Coordinator   [facility]  [phone]

## 2024-08-21 NOTE — TELEPHONE ENCOUNTER
Patient calls back inquiring about status of request. Writer inform caller message was sent to provider- awaiting for response. Writer will send provider another message. Caller verbalizes understanding.

## 2024-08-22 ENCOUNTER — PATIENT OUTREACH (OUTPATIENT)
Dept: CARE COORDINATION | Facility: CLINIC | Age: 64
End: 2024-08-22
Payer: MEDICARE

## 2024-08-22 NOTE — PROGRESS NOTES
"Clinic Care Coordination Contact:  Community Health Worker Initial Outreach    Reason(s):  -Return call (FYI: see Renea DOBBS encounter dated 8/14/2024 for details if need)  -CCC CHW Initial Outreach Called- Referral to Clinic Care Coordination (CCC) Service    Chart reviewed:   -CCC SW placed referral to FRW dept on 8/21/2024 per SW notes reviewed.    CHW Initial Information Gathering:  Preferred Hospital: [facility]  [phone]  Preferred Urgent Care: [facility], [phone]  Current living arrangement:: Other (Patient shared; I live with my sister, Brandon and her  in  a single house with stairs.)  Type of residence:: Private home - stairs  Transportation means:: Family, Regular car (Patient shared; My fiance drive and passed away last year at age 89. I live \"400\" feet from the [facility]. Walk to appointment. My sister, Brandon will join me at my appointment.)    Care Mgmt (GEN) screening:   -Employment status? Disabled per patient.  -Mobility Status? Independent per patient.  -Fallen 2 or more times in the past year? No,  per patient.  -Any fall with injury in the past year? No,  per patient.     Patient accepts CC: Yes. Patient scheduled for assessment with [facility]  on 8/26/2024 at 3:00PM via phone visit. Patient noted desire to discuss apply medical insurance program, switch health care insurance from Medicare/[employer] care to [facility], apply metro mobility program, apply food stamp, and solve medical bill with [facility] per patient. Note/comment: Patient is aware that  will call pt via phone at appt date and appt time. Pt confirmed.    Potential Patient Outreach Discussion:   Attempted #1: Patient was identified as a potential candidate and referred to Clinic Care Coordination Service. CHW call and spoke with patient today, 8- to discuss possible clinic care coordination enrollment. Have introduced myself to patient. Clinic " "care coordination service was described to the patient and immediate needs were discussed. Patient is aware [facility] team includes CHW, BOBO, RN, and FRW. The patient agreed enrollment into [facility] service.     CHW relayed SW notes encounter dated 8/21/2024 with patient and suggested pt to wait to hear from [facility] FRW dept for further discussion. Pt agreed.     CHW explained initial assessment, [facility] monthly outreach follow up and CCC outreach standard work. CHW completed the CHW screening and Care Mgmt (GEN) above with patient. CHW assisted with scheduling patient initial assessment appt with [facility] . CHW contact info given to the patient.     Patient shared:   -Preferred name to be called \"Meg.\" You (FYI: CHW) can call me Meg.   -Like to switch health care insurance from [employer] to [facility] so I don't have to pay for my medications. Pay $100 out of pocket for my medications each time.   -Fixed income independent.   -Do not have enough income to buy food.   \"Disabled and on SSDI\" benefit currently.  -Owe \"$1,999.00\" plus \"$253.00\" medical bill with [facility].   -My daughter is normally help me out to apply health care insurance in the past. We're no longer talking with each other.   -[address]. Neema and I have been with each other for \"35 years\" and he passed away last year.   -Live 400 feet away from the [facility]. Walk to attend medical appt. My sister will join me at the appt for my visit.   -Do not have transportation to attend medical appt. Interested to apply metro mobility. Have the application and needs help to fill it out.   -Mobility: independently. Can walk to outside and sit in the sun.   -I'm not a morning person. Prefer appt scheduling with  in the afternoon.   -Needs help for the following: apply medical assistance program, food stamp program, [employer] Mobility to attend medical appt, and solve medical bill with [facility].     Referral placed today:   Patient " shared food and financial insecurity concerns. CHW shared Food is [employer] Program and vegetable program with [facility]. Patient is interested and agreed referral send to [facility] and Food is Medicine program. A staff message sent to Food Navigator, Garrett with [employer] via staff message, and message route to clinic , Jade for the clinic vegetable program to connect with patient for further assistance. Patient is aware to seek further information about availability and enrollment. Patient confirmed.     Note/comment:   CHW team with [facility] consulted about message received from clinical team on 8/14 and 8/15 (fyi: CHW encounter dated 8/14/2024) today, 8/22/2024. CHW call patient today and offer enrollment into [facility] service.     Plan:   -Food is Medicine program referral: a staff message sent to [facility] to connect with patient for further discussion.   -Vegetable program with [facility]: message route to clinic/, Jade to connect with patient for further discussion.   -County program (Food stamp, health care insurance program): patient wait to hear from [facility] FRW dept for further discussion.   -Patient attend initial assessment appt on Monday, 8/26/2024 at 3:00PM with [facility]  via phone visit.

## 2024-08-22 NOTE — PROGRESS NOTES
Clinic Care Coordination Contact:    Today's date: 8/22/2024    Coordinate Care:   Please see CHW encounter dated 08/22/2024 for details if need.     Plan:   Call patient and offer enrollment into Care Coordination Service.

## 2024-08-23 ENCOUNTER — PATIENT OUTREACH (OUTPATIENT)
Dept: CARE COORDINATION | Facility: CLINIC | Age: 64
End: 2024-08-23
Payer: MEDICARE

## 2024-08-25 ASSESSMENT — ENCOUNTER SYMPTOMS
DYSURIA: 0
ARTHRALGIAS: 0
VOMITING: 0
SEIZURES: 1
COLOR CHANGE: 0
FEVER: 0
CHILLS: 0
EYE PAIN: 0
SHORTNESS OF BREATH: 1
PALPITATIONS: 0
SORE THROAT: 0
COUGH: 0
HEMATURIA: 0
ABDOMINAL PAIN: 1
BACK PAIN: 0

## 2024-08-26 ENCOUNTER — PATIENT OUTREACH (OUTPATIENT)
Dept: CARE COORDINATION | Facility: CLINIC | Age: 64
End: 2024-08-26

## 2024-08-26 NOTE — LETTER
Pending sale to [facility]  Food Resource Navigator, [facility] [place] [place]    [address]  [place] [address]         September 4, 2024      Kerry Reed  [address]  [place] [address]        Dear [place]    I am a Food Resource Navigator who works with your provider at [facility].     I recently tried to call and was unable to reach you. Below is a description of our Food Resource Navigation program and how I can further assist you.     The Food Resource Navigators are available to help connect you to helpful food resources in your area and for your specific needs. The goal of the Food Resource Navigator program is to help you access healthy and delicious foods that can support your overall health and well-being. We will work alongside your provider to assist you in addressing nutrition related health needs. We can help connect you with community resources, government support programs, and [place] Food is Medicine programs. We d love to address anything from food access to cooking skills, to nutrition programs. Our services are voluntary and are offered without charge to you personally.     Please feel free to contact me with any questions or concerns regarding Food Resource Navigation and what support I can offer. We are focused on providing you with the highest quality healthcare experience possible.     Sincerely,     Garrett Rahman  [facility] Food Resource Navigator  Food is Medicine   Cell: [phone]

## 2024-08-26 NOTE — PROGRESS NOTES
I called and left patient a message to contact FRN back regarding food resources.     Garrett Rahman  Community [employer] Food Resource Navigator  Food is Medicine   Cell: [phone]

## 2024-08-27 ENCOUNTER — PATIENT OUTREACH (OUTPATIENT)
Dept: CARE COORDINATION | Facility: CLINIC | Age: 64
End: 2024-08-27
Payer: MEDICARE

## 2024-09-04 NOTE — PROGRESS NOTES
I am unable to reach the patient x2. I will send a letter of my programs and services.     Garrett Rahman  [facility] Food Resource Navigator  Food is Medicine   Cell: [phone]

## 2024-09-10 ENCOUNTER — TELEPHONE (OUTPATIENT)
Dept: FAMILY MEDICINE | Facility: CLINIC | Age: 64
End: 2024-09-10
Payer: MEDICARE

## 2024-09-10 NOTE — TELEPHONE ENCOUNTER
Patient Quality Outreach    Patient is due for the following:   Breast Cancer Screening - Mammogram    Next Steps:   Schedule a Adult Preventative    Type of outreach:    Will follow up in 90 days; no contact made.      Questions for provider review:    None           Andrea Behrend

## 2024-09-12 ENCOUNTER — PATIENT OUTREACH (OUTPATIENT)
Dept: CARE COORDINATION | Facility: CLINIC | Age: 64
End: 2024-09-12
Payer: MEDICARE

## 2024-09-12 ENCOUNTER — TELEPHONE (OUTPATIENT)
Dept: FAMILY MEDICINE | Facility: CLINIC | Age: 64
End: 2024-09-12
Payer: MEDICARE

## 2024-09-12 DIAGNOSIS — G89.29 CHRONIC LEFT-SIDED LOW BACK PAIN WITHOUT SCIATICA: ICD-10-CM

## 2024-09-12 DIAGNOSIS — M41.9 SCOLIOSIS OF THORACOLUMBAR SPINE, UNSPECIFIED SCOLIOSIS TYPE: ICD-10-CM

## 2024-09-12 DIAGNOSIS — Z71.89 OTHER SPECIFIED COUNSELING: Primary | Chronic | ICD-10-CM

## 2024-09-12 DIAGNOSIS — M54.50 CHRONIC LEFT-SIDED LOW BACK PAIN WITHOUT SCIATICA: ICD-10-CM

## 2024-09-12 RX ORDER — METHOCARBAMOL 500 MG/1
500 TABLET, FILM COATED ORAL 4 TIMES DAILY PRN
Qty: 60 TABLET | Refills: 0 | Status: SHIPPED | OUTPATIENT
Start: 2024-09-12

## 2024-09-12 RX ORDER — CYCLOBENZAPRINE HCL 5 MG
5 TABLET ORAL
Qty: 30 TABLET | Refills: 0 | Status: SHIPPED | OUTPATIENT
Start: 2024-09-12

## 2024-09-12 NOTE — PROGRESS NOTES
Food Resource Navigator Contact      Clinical Data: Food Resource Navigator Outreach    Called today to discuss potential of enrolling in food resource programs. Did leave a voicemail. Will plan to call back in 1-2 business days.     Amanda Arriola   [facility] Food Resource Navigator  Food is Medicine   [phone]
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
## 2024-09-12 NOTE — TELEPHONE ENCOUNTER
PCP note from 8/12/2024 reviewed.  It takes both Flexeril and methocarbamol for chronic left-sided low back pain without sciatica.

## 2024-09-12 NOTE — TELEPHONE ENCOUNTER
Medication Question or Refill        What medication are you calling about (include dose and sig)?: cyclobenzaprine (FLEXERIL) 5 MG tablet, methocarbamol (ROBAXIN) 500 MG tablet     Preferred Pharmacy:  [id number] - [place], [place] - [address] [place] [address] AT [facility] [address]  [address] [place] [address]  Phone: [phone] Fax: [phone]        Controlled Substance Agreement on file:   CSA -- Patient Level:    CSA: None found at the patient level.       Who prescribed the medication?: Dr. Marla Moreno    Do you need a refill? Yes    When did you use the medication last? 9/12/2024    Patient offered an appointment? No    Do you have any questions or concerns?  Yes: patient has enough medication to last until Saturday 9/14      Okay to leave a detailed message?: Yes at Home number on file [phone] (home)

## 2024-09-12 NOTE — Clinical Note
Patient accepts CC: Yes. Patient scheduled for assessment with CC SW  on 9-13-24 at 1pm. Patient noted desire to discuss applying for MA insurance, wants to switch to [employer] insurance next month..  apply: food stamp, medical assistance program (& switch from Medicare/United [employer] to [facility], metro mobility, and solve medical bill with [facility]. (FYI: enroll with Renea)
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
## 2024-09-13 ENCOUNTER — PATIENT OUTREACH (OUTPATIENT)
Dept: FAMILY MEDICINE | Facility: CLINIC | Age: 64
End: 2024-09-13
Payer: MEDICARE

## 2024-09-13 ENCOUNTER — PATIENT OUTREACH (OUTPATIENT)
Dept: CARE COORDINATION | Facility: CLINIC | Age: 64
End: 2024-09-13

## 2024-09-13 ASSESSMENT — ACTIVITIES OF DAILY LIVING (ADL): DEPENDENT_IADLS:: INDEPENDENT

## 2024-09-13 NOTE — LETTER
September 13, 2024      Kerry Reed  [address] [place] [address]        Dear [place]    I am a Food Resource Navigator who works with your provider at [facility].   I recently tried to call and was unable to reach you. Below is a description of our Food Resource Navigation program and how I can further assist you.     The Food Resource Navigators are available to help connect you to helpful food resources in your area and for your specific needs. The goal of the Food Resource Navigator program is to help you access healthy and delicious foods that can support your overall health and well-being. We will work alongside your provider to assist you in addressing nutrition related health needs. We can help connect you with community resources, government support programs, and [place] Food is Medicine programs. We d love to address anything from food access to cooking skills, to nutrition programs. Our services are voluntary and are offered without charge to you personally.     Please feel free to contact me with any questions or concerns regarding Food Resource Navigation and what support I can offer. We are focused on providing you with the highest quality healthcare experience possible.     Sincerely,     Amanda Arriola   [facility] Food Resource Navigator  Food is Medicine   [phone]

## 2024-09-13 NOTE — LETTER
Social Security Administration ([facility])  Plan to Achieve Self-Support (PASS)    Social Security Administration ([facility]) - [place]    [address], [address], [place], [place], [address]    [phone] Ext: [phone]    Social Security Administration ([facility]) - [place]    [address], [address], [place], [place], [address]    [phone] Ext: [phone]    Social Security Administration ([facility]) - [place]    [address], [address], [place], [place], [address] [phone]    Website

## 2024-09-13 NOTE — LETTER
[facility]  Patient Centered Plan of Care  About Me:        Patient Name:  Kerry Reed    YOB: 1960  Age:         64 year old   Christos MRN:    [id number] Telephone Information:  Home Phone [phone]   Mobile [phone]       Address:  [address]  [place] [address] Email address:  [email or web address]      Emergency Contact(s)    Name Relationship Lgl Grd Work Phone Home Phone Mobile Phone   1. TJ SAENZ Other   [phone]            Primary language:  English     needed? No   [place] [employer] [facility]:  [phone] op. 1  Other communication barriers:None    Preferred Method of Communication:     Current living arrangement: Other (Patient shared; I live with my sister, Brandon and her  in  a single house with stairs.)    Mobility Status/ Medical Equipment: Independent        Health Maintenance  Health Maintenance Reviewed: Up to date      My Access Plan  Medical Emergency 911   Primary Clinic Line [facility] - [phone]   24 Hour Appointment Line [phone] or  [phone]) (toll-free)   24 Hour Nurse Line [phone] (toll-free)   Preferred Urgent Care [facility], [phone]     [facility]  [phone]     Preferred Pharmacy [id number] - [place], [place] - [address]     Behavioral Health Crisis Line The National Suicide Prevention Lifeline at 1-812.876.7492 or Text/Call 418           My Care Team Members  Patient Care Team         Relationship Specialty Notifications Start End    Ada Espinoza MD PCP - General Family Medicine  1/22/24     Phone: [phone] Fax: [phone] [address] [facility] [facility] [address]    Ada Espinoza MD Assigned PCP   6/16/21     Phone: [phone] Fax: [phone]         [address] [facility] [facility] [address]    Garrett Rahman, MA  Food Resource Navigator   8/26/24     Jonathan Chan CHW Community Health Worker Primary Care - CC Admissions 9/12/24     Phone: [phone] Fax: [phone]         [address] [place] [address]    Raymond Peters MSW Lead Care Coordinator  Admissions 9/12/24     Lalita Wang Financial Resource Worker   9/12/24     Phone: [phone]                     My Care Plans  Self Management and Treatment Plan    Care Plan  Care Plan: Financial Wellbeing       Problem: Patient expresses financial resource strain       Goal: Create an action plan to increase financial stability       Start Date: 9/13/2024    This Visit's Progress: 50%    Note:     Barriers: Knowledge   Strengths: Supportive sister  Patient expressed understanding of goal: Yes  Action steps to achieve this goal:  1. I will engage with FRW to apply for county benefits in next  1-2 weeks  2. I will go social security office and update my address in next month.   3. I will engage with CC monthly and request additional support as needed.                                 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)    Other specified hypothyroidism    S/P cholecystectomy    Chondromalacia patellae    Osteoarthritis, knee    Generalized anxiety disorder    History of cervical cancer    Other specified menopausal and postmenopausal disorder    Tobacco use disorder    Abdominal tenderness, left lower quadrant    Convulsions (H)    Epilepsy (H)    Sprain and strain of other specified sites of knee and leg    Acute cholecystitis    Acute hypokalemia    Anxiety    Closed fracture of left tibial plateau    COVID-19 virus infection    Diarrhea     Disorder of electrolytes    Generalized muscle weakness    Hypomagnesemia    Hypophosphatemia      Current Medications and Allergies:  See printed Medication Report.    Care Coordination Start Date: 9/12/2024   Frequency of Care Coordination: monthly, more frequently as needed     Form Last Updated: 09/13/2024

## 2024-09-13 NOTE — PROGRESS NOTES
Food Resource Navigator Contact      [facility]/Voicemail      Left message on patient's voicemail with call back information and requested return call.    Plan: Food Resource Navigator will send food resource navigator introduction letter with food resource navigator contact information and explanation of food resource services via mail. Food Resource Navigator will do no further outreaches at this time.    Amanda Arriola   [facility] Food Resource Navigator  Food is Medicine   [phone]

## 2024-09-13 NOTE — LETTER
[facility] CARE COORDINATION  [address] [facility] [address]    September 13, 2024    Kerry Reed  [address]  [place] [address]      Dear [place],    I am a clinic care coordinator who works with CONNOR TIWARI MD with the [facility]. I wanted to thank you for spending the time to talk with me.  Below is a description of clinic care coordination and how I can further assist you.       The clinic care coordination team is made up of a registered nurse, , financial resource worker and community health worker who understand the health care system. The goal of clinic care coordination is to help you manage your health and improve access to the health care system. Our team works alongside your provider to assist you in determining your health and social needs. We can help you obtain health care and community resources, providing you with necessary information and education. We can work with you through any barriers and develop a care plan that helps coordinate and strengthen the communication between you and your care team.  Our services are voluntary and are offered without charge to you personally.    Please feel free to contact me with any questions or concerns regarding care coordination and what we can offer.      We are focused on providing you with the highest-quality healthcare experience possible.    Sincerely,     Dewayne Peters, [facility]  Social Work Care Cooridinator   [facility]   Phone: [phone]

## 2024-09-13 NOTE — PROGRESS NOTES
Clinic Care Coordination Contact  Clinic Care Coordination Contact  OUTREACH    Referral Information:PCP     Chief Complaint   Patient presents with    Clinic Care Coordination - Initial     Patient is 64 year old woman who resides with her sister. Patient  noted her primary concerns now is health insurance and SNAP. Patient shared she used to MA however she not sure why she lost. Patient is interested in reapplying for MA and SNAP. Patient shared her primary income social security disability. She currently receiving about $1300. Patient noted social security has an address in [facility]. She wondering how she update social security. Writer informed patient she call social security office or stop by local office. Patient noted she tried calling however they requesting for to verify her old address which cannot remember. Writer encouraged for patient to stop by [facility] and make sure she takes her ID.  Writer also encouraged for patient to answer ZAY almendarez calls as she can help with applying for [facility] benefits. Writer provided Lalita number.     Writer also discussed engage with food resource navigator however patient noted she only interested in applying for SNAP. Patient noted she aware of food pantries and is not interested in engage with food resources navigator this time.    Universal Utilization:appropriate   Clinic Utilization  Difficulty keeping appointments:: No  Compliance Concerns: No  No-Show Concerns: No  No PCP office visit in Past Year: No  Utilization      No Show Count (past year)  1             ED Visits  0             Hospital Admissions  0                    Current as of: 9/13/2024  2:43 PM                Clinical Concerns:  Current Medical Concerns:    Current Outpatient Medications   Medication Sig Dispense Refill    acetaminophen (TYLENOL) 500 MG tablet Take 1,000 mg by mouth      albuterol (PROAIR HFA/PROVENTIL HFA/VENTOLIN HFA) 108 (90 Base) MCG/ACT inhaler Inhale 2 puffs into the lungs  every 4 hours as needed for wheezing 18 g 1    carBAMazepine (TEGRETOL) 200 MG tablet TAKE 1 TABLET(200 MG) BY MOUTH TWICE DAILY 180 tablet 3    cetirizine (ZYRTEC) 10 MG tablet Take 10 mg by mouth daily as needed for allergies (Patient not taking: Reported on 8/12/2024)      cyclobenzaprine (FLEXERIL) 5 MG tablet Take 1 tablet (5 mg) by mouth nightly as needed for muscle spasms. 30 tablet 0    gabapentin (NEURONTIN) 100 MG capsule Take 1 capsule (100 mg) by mouth 3 times daily 90 capsule 3    KEPPRA  MG 24 hr tablet Take 2 tablets (1,000 mg) by mouth 2 times daily 180 tablet 3    loratadine (CLARITIN) 10 MG tablet Take 1 tablet (10 mg) by mouth daily as needed for allergies (Patient not taking: Reported on 8/12/2024) 90 tablet 3    methocarbamol (ROBAXIN) 500 MG tablet Take 1 tablet (500 mg) by mouth 4 times daily as needed for muscle spasms. 60 tablet 0    SYNTHROID 50 MCG tablet TAKE 1 TABLET BY MOUTH DAILY 90 tablet 2    triamterene-HCTZ (DYAZIDE) 37.5-25 MG capsule TAKE 1 CAPSULE BY MOUTH DAILY AS NEEDED 90 capsule 1     No current facility-administered medications for this visit.         Current Behavioral Concerns: Denies mental health concern at this time.    Education Provided to patient: SNAP and health insurance.    Pain  Pain (GOAL):: No  Health Maintenance Reviewed: Up to date  Clinical Pathway: None    Medication Management:  Medication review status: Medications reviewed and no changes reported per patient.             Functional Status:  Dependent ADLs:: Independent  Dependent IADLs:: Independent  Bed or wheelchair confined:: No  Mobility Status: Independent  Fallen 2 or more times in the past year?: No  Any fall with injury in the past year?: No    Living Situation:  Current living arrangement:: Other (Patient shared; I live with my sister, Brandon and her  in  a single house with stairs.)  Type of residence:: Private home - stairs    Lifestyle & Psychosocial Needs:    Social Determinants  "of Health     Food Insecurity: No Food Insecurity (7/12/2024)    Received from [facility]    Hunger Vital Sign     Worried About Running Out of Food in the Last Year: Never true     Ran Out of Food in the Last Year: Never true   Depression: Not at risk (1/22/2024)    PHQ-2     PHQ-2 Score: 0   Housing Stability: High Risk (7/12/2024)    Received from [facility]    Housing Stability Vital Sign     Unable to Pay for Housing in the Last Year: No     Number of Places Lived in the Last Year: Not on file     Unstable Housing in the Last Year: Yes   Tobacco Use: Medium Risk (8/12/2024)    Patient History     Smoking Tobacco Use: Former     Smokeless Tobacco Use: Never     Passive Exposure: Not on file   Financial Resource Strain: Low Risk  (1/22/2024)    Financial Resource Strain     Within the past 12 months, have you or your family members you live with been unable to get utilities (heat, electricity) when it was really needed?: No   Alcohol Use: Not on file   Transportation Needs: No Transportation Needs (7/12/2024)    Received from [facility]    PRAPARE - Transportation     Lack of Transportation (Medical): No     Lack of Transportation (Non-Medical): No   Physical Activity: Not on file   Interpersonal Safety: Unknown (7/13/2024)    Received from [facility]    Humiliation, Afraid, Rape, and Kick questionnaire     Fear of Current or Ex-Partner: Not on file     Emotionally Abused: No     Physically Abused: No     Sexually Abused: No   Stress: Not on file   Social Connections: Not on file   Health Literacy: Not on file     Diet:: Regular  Inadequate nutrition (GOAL):: No  Tube Feeding: No  Inadequate activity/exercise (GOAL):: No  Significant changes in sleep pattern (GOAL): No  Transportation means:: Family, Regular car (Patient shared; My fiance drive and passed away last year at age 89. I live \"400\" feet from the [facility]. Walk to appointment. My sister, Brandon will join me at my " appointment.)     Mormon or spiritual beliefs that impact treatment:: No  Mental health DX:: No  Mental health management concern (GOAL):: No  Chemical Dependency Status: No Current Concerns  Informal Support system:: Family        Resources and Interventions:  Current Resources:      Community Resources: None  Supplies Currently Used at Home: None  Equipment Currently Used at Home: none  Employment Status: disabled         Advance Care Plan/Directive  Advanced Care Plans/Directives on file:: No    Referrals Placed: None       Care Plan:  Care Plan: Financial Wellbeing       Problem: Patient expresses financial resource strain       Goal: Create an action plan to increase financial stability       Start Date: 9/13/2024    This Visit's Progress: 50%    Note:     Barriers: Knowledge   Strengths: Supportive sister  Patient expressed understanding of goal: Yes  Action steps to achieve this goal:  1. I will engage with FRW to apply for [facility] benefits in next  1-2 weeks  2. I will go social security office and update my address in next month.   3. I will engage with CC monthly and request additional support as needed.                                 Patient/Caregiver understanding: Pt reports understanding and denies any additional questions or concerns at this times. SW CC engaged in [facility] communication during encounter.      Outreach Frequency: monthly, more frequently as needed      Plan: Patient will engage with FRW for [facility] [employer] and Stop by social security office to update her address.     Dewayne Peters [facility]  Social Work Care [facility]   Phone: [phone]

## 2024-09-16 ENCOUNTER — PATIENT OUTREACH (OUTPATIENT)
Dept: CARE COORDINATION | Facility: CLINIC | Age: 64
End: 2024-09-16
Payer: MEDICARE

## 2024-09-16 DIAGNOSIS — G40.A09 NONINTRACTABLE ABSENCE EPILEPSY WITHOUT STATUS EPILEPTICUS (H): ICD-10-CM

## 2024-09-16 RX ORDER — LEVETIRACETAM 500 MG
1000 TABLET, EXTENDED RELEASE 24 HR ORAL 2 TIMES DAILY
Qty: 180 TABLET | Refills: 3 | Status: SHIPPED | OUTPATIENT
Start: 2024-09-16

## 2024-09-16 NOTE — TELEPHONE ENCOUNTER
Medication Question or Refill    Contacts       Contact Date/Time Type Contact Phone/Fax    09/16/2024 08:41 AM CDT Phone (Incoming) Kerry Reed (Self) [phone] (M)            What medication are you calling about (include dose and sig)?: KEPPRA  MG 24 hr tablet     Preferred Pharmacy:  [facility][id number] - [place], [place] - [address] AT [facility] & [address]  [address] [place] [address]  Phone: [phone] Fax: [phone]        Controlled Substance Agreement on file:   CSA -- Patient Level:    CSA: None found at the patient level.       Who prescribed the medication?: Dr. Marla Moreno    Do you need a refill? Yes    When did you use the medication last? 9/16/2024    Patient offered an appointment? No    Do you have any questions or concerns?  Yes: patient will be out of medication on Wednesday      Okay to leave a detailed message?: Yes at Home number on file [phone] (home)

## 2024-09-16 NOTE — PROGRESS NOTES
"Clinic Care Coordination Contact:    Reason: \"review assessment/goals if CHW needs to follow up\" per panel reviewed    Writer panel report reviewed:      Chart reviewed:  -CHW initial outreach attempts completed on 8/13/2024, 8/14/2024, and 8/22/2204 by Renea DOBBS.  -FRW referral placed on 8/21/2024 by [facility] Yary BROUSSARD per notes reviewed.  -Patient completed initial assessment appt on 9/13/2024 with [facility] Raymond. \"Plan: Patient will engage with FRW for [facility] benefits and Stop by social security office to update her address\" per [facility] SW notes reviewed.  -See FRW encounter dated 8/23/2024 and 8/27/2024 notes for additional details if need.   -FRW dept/team attempted to connect with patient per FRW encounter dated 9/13/2024 notes reviewed.  -Garrett STEWART unable to reach patient per encounter dated 9/04/2024 notes reviewed. Please see notes for details if need.  -RUBEN Gallo with [employer] have an Food Resource Navigator encounter dated 9/13/2024  for this patient. Please see notes/encounter for details if need.     Coordinate Care:   Completed panel and chart reviewed above. FRW dept attempted to connect with pt. No action need from writer to follow up goal at this time. CHW coordinated with assigned lead care coordination/SW today via Teams communication with update assigned CHW per Jonathan DOBBS encounter dated 9/12/2024. Defer CHW outreach to next month.     CHW Next Follow-up: Current goal follow up. Informed patient of due care gaps (if any). Encouraged pt to work with [employer] [facility] [employer] and FRW dept(s) toward goal completion.     Outreach frequency: monthly      CHW Plan:   -Next CHW outreach plan: 1 month to monitor the progression of their goal(s).        "

## 2024-09-17 ENCOUNTER — PATIENT OUTREACH (OUTPATIENT)
Dept: CARE COORDINATION | Facility: CLINIC | Age: 64
End: 2024-09-17
Payer: MEDICARE

## 2024-10-13 NOTE — TELEPHONE ENCOUNTER
Patient calling to request alternative antibiotic due to diarrhea.     Reports she was inpatient at [facility] and discharged this past Sunday 7/14 following abdominal surgery for biliary stent removal & replacement. Records are currently unavailable via [facility] (pending). She was discharged on the following abx:    -metronidazaole 500 mg TID x 5 days  -cefpodoxime 200 mg q 12 hours x 5 days    Having diarrhea 3-4x per day since starting this. Has some mild abdominal pain following surgery but this is not worsening and is controlled.     Recommended hospital follow up visit--patient does have this scheduled for 7/23 but is unable to come any sooner as her sister, whom is her primary means of transportation, was also just discharged from the hospital. She declined virtual visit as her insurance reportedly does not cover this.    Recommended probiotics, possibly Imodium, and adequate fluid intake to prevent dehydration. Will consult with PCP for further advisement.    stretcher

## 2024-10-16 ENCOUNTER — TELEPHONE (OUTPATIENT)
Dept: FAMILY MEDICINE | Facility: CLINIC | Age: 64
End: 2024-10-16
Payer: MEDICARE

## 2024-10-16 DIAGNOSIS — G89.29 CHRONIC LEFT-SIDED LOW BACK PAIN WITHOUT SCIATICA: ICD-10-CM

## 2024-10-16 DIAGNOSIS — M54.50 CHRONIC LEFT-SIDED LOW BACK PAIN WITHOUT SCIATICA: ICD-10-CM

## 2024-10-16 NOTE — TELEPHONE ENCOUNTER
Patient called requesting refill for methocarbamol (ROBAXIN) 500 MG tablet. Patient stated her insurance will not cover cyclobenzaprine (FLEXERIL) 5 MG tablet so she would like more of the methocarbamol.    Rx pended.        Cecil Kenney, BSN RN  [facility]

## 2024-10-16 NOTE — TELEPHONE ENCOUNTER
General Call    Contacts       Contact Date/Time Type Contact Phone/Fax    10/16/2024 11:03 AM CDT Phone (Incoming) Kerry Reed (Self) [phone] ()          Reason for Call:  Help with insurance    What are your questions or concerns:  patient would like to speak with Lalita in regards to her insurance and some other paperwork. Please call patient back    Date of last appointment with provider: n/a    Okay to leave a detailed message?: Yes at Home number on file [phone] ([place])

## 2024-10-17 ENCOUNTER — PATIENT OUTREACH (OUTPATIENT)
Dept: CARE COORDINATION | Facility: CLINIC | Age: 64
End: 2024-10-17
Payer: MEDICARE

## 2024-10-17 DIAGNOSIS — Z71.89 OTHER SPECIFIED COUNSELING: Primary | Chronic | ICD-10-CM

## 2024-10-17 NOTE — PROGRESS NOTES
Clinic Care Coordination Contact  [facility]/Voicemail    Clinical Data: Care Coordinator Outreach        Received a message from PCP team. Patient is request MARCO Lalita to connect with her. Left message on patient's voicemail with call back information and requested return call. Writer also informed patient MARCO has attempted back in sept and since they haven't been to connect them they closed referral. Writer informed patient writer will resend referral.     Plan: CHW will try to reach patient again in 10 business days.    Dewayne Peters [facility]  Social Work Care Cooridinator   [facility]   Phone: [phone]

## 2024-10-18 ENCOUNTER — PATIENT OUTREACH (OUTPATIENT)
Dept: CARE COORDINATION | Facility: CLINIC | Age: 64
End: 2024-10-18
Payer: MEDICARE

## 2024-10-18 RX ORDER — METHOCARBAMOL 500 MG/1
500 TABLET, FILM COATED ORAL 4 TIMES DAILY PRN
Qty: 120 TABLET | Refills: 0 | Status: SHIPPED | OUTPATIENT
Start: 2024-10-18

## 2024-10-18 NOTE — TELEPHONE ENCOUNTER
Patient calls back inquiring about status of request. Writer inform caller message was sent to provider- awaiting for response. Writer will send provider another message. Caller verbalizes understanding. Pt states she is completely out. Please advise.  Thanks!

## 2024-10-23 ENCOUNTER — PATIENT OUTREACH (OUTPATIENT)
Dept: CARE COORDINATION | Facility: CLINIC | Age: 64
End: 2024-10-23
Payer: MEDICARE

## 2024-10-28 ENCOUNTER — PATIENT OUTREACH (OUTPATIENT)
Dept: CARE COORDINATION | Facility: CLINIC | Age: 64
End: 2024-10-28
Payer: MEDICARE

## 2024-10-28 NOTE — PROGRESS NOTES
Care Coordination Clinician Chart Review    Situation: Patient chart reviewed by Care Coordinator.       Background: Care Coordination Program started: 9/12/2024. Initial assessment completed and patient-centered care plan(s) were developed with participation from patient. Lead CC handed patient off to CHW for continued outreaches.       Assessment: Per chart review, patient outreach completed by CC CHW on 10/28/2024.  Patient is actively working to accomplish goal(s). Patient's goal(s) appropriate and relevant at this time. Patient is not due for updated Plan of Care.  Assessments will be completed annually or as needed/with change of patient status.      Care Plan: Financial Wellbeing       Problem: Patient expresses financial resource strain       Goal: Create an action plan to increase financial stability       Start Date: 9/13/2024    This Visit's Progress: 50% Recent Progress: 50%    Note:     Barriers: Knowledge   Strengths: Supportive sister  Patient expressed understanding of goal: Yes    Action steps to achieve this goal:  1. I will engage with FRW to apply for county benefits in next  1-2 weeks  2. I will go social security office and update my address in next month.   3. I will engage with CC monthly and request additional support as needed.                                    Plan/Recommendations: The patient will continue working with Care Coordination to achieve goal(s) as above. CHW will continue outreaches at minimum every 30 days and will involve Lead CC as needed or if patient is ready to move to Maintenance. Lead CC will continue to monitor CHW outreaches and patient's progress to goal(s) every 6 weeks.     Plan of Care updated and sent to patient: Lu Peters [facility]  Social Work Care [facility]   Phone: [phone]

## 2024-10-28 NOTE — PROGRESS NOTES
"Clinic Care Coordination Contact  [facility]/Voicemail    Reason: [facility] CHW Follow Up Call (follow up current goal's)    Patient chart reviewed:   -Per [facility] SW encounter dated 10/17/2024 notes reviewed; \"Received a message from PCP team. Patient is request FRW Lalita to connect with her.\"  -FRW unable to reach patient. Per [facility] FRW encounter dated 10/23/2024 notes reviewed;  Outreach attempted x 2. Left message on voicemail indicating last outreach attempt.   Plan: FRW closed the FRW program, sent letter. Patient can be referred back to FRW if needed.\"    Clinical Data: Care Coordinator Outreach:    Outreach Documentation Number of Outreach Attempt   10/28/2024  10:18 AM 1     Attempted #1:  Left message on patient's voicemail with call back information and requested return call.    Plan:   Attempt #2: Care Coordinator will try to reach patient again in 2 weeks.          "

## 2024-11-06 ENCOUNTER — PATIENT OUTREACH (OUTPATIENT)
Dept: CARE COORDINATION | Facility: CLINIC | Age: 64
End: 2024-11-06
Payer: MEDICARE

## 2024-11-11 ENCOUNTER — PATIENT OUTREACH (OUTPATIENT)
Dept: CARE COORDINATION | Facility: CLINIC | Age: 64
End: 2024-11-11
Payer: MEDICARE

## 2024-11-11 NOTE — PROGRESS NOTES
"Clinic Care Coordination Contact:  [facility]/Voicemail    Today's date: 11/11/2024    Reason(s):   -[facility] CHW Follow Up Call (follow up current goal's)  -Follow up message received from [facility] Lizbeth LARA encounter dated 11/06/2024 regarding to \"local food resources.\"     Clinical Data: Care Coordinator Outreach:    Outreach Documentation Number of Outreach Attempt   10/28/2024  10:18 AM 1   11/11/2024  10:49 AM 2     Attempted #2:   Left message on patient's voicemail with call back information and requested return call.    Note/comment:   -CHW plan to follow up with patient again in 1 month follow up local food resource needs.     Plan:   Attempt #3: Care Coordinator will send unable to contact letter with care coordinator contact information via mail. Care Coordinator will try to reach patient again in 1 month. Route encounter to assigned lead care coordinator if unsuccessfully outreach.                  "

## 2024-11-11 NOTE — LETTER
[facility] CARE COORDINATION  [address] [place] [address]     November 11, 2024    Kerry Reed  [address]  [place] [address]      Dear [place],    I have been attempting to reach you since our last contact. I would like to continue to work with you and provide any additional support you may need on achieving your health care related goals. I would appreciate if you would give me a call at [phone] to let me know if you would like to continue working together. I know that there are many things that can affect our ability to communicate and I hope we can continue to work together.    We are focused on providing you with the highest-quality healthcare experience possible. Thank you.        Sincerely,    Renea Martinez Community Health Worker (CHW Certified)  [facility]  Clinic Care Coordination  Office: [phone]  Clinic: [phone]

## 2024-11-12 ENCOUNTER — PATIENT OUTREACH (OUTPATIENT)
Dept: CARE COORDINATION | Facility: CLINIC | Age: 64
End: 2024-11-12
Payer: MEDICARE

## 2024-11-12 NOTE — PROGRESS NOTES
Clinic Care Coordination Contact  [facility]/Voicemail    Clinical Data: Care Coordinator Outreach        Left message on patient's voicemail with call back information and requested return call.      Plan:  CHW will follow up in 10 and schedule a visit with writer as needed.     Dewayne Peters [facility]  Social Work Care [place]   [facility]   Phone: [phone]

## 2024-11-12 NOTE — PROGRESS NOTES
Clinic Care Coordination Contact:    Today's date: 11/12/2024    Voicemail check:  Patient returning CHW called below by left a voice message on writer voicemail.    Plan:   Care Coordinator team will try to connect with patient again in 5-10 business days.

## 2024-11-20 ENCOUNTER — PATIENT OUTREACH (OUTPATIENT)
Dept: CARE COORDINATION | Facility: CLINIC | Age: 64
End: 2024-11-20
Payer: MEDICARE

## 2024-11-26 DIAGNOSIS — G89.29 CHRONIC LEFT-SIDED LOW BACK PAIN WITHOUT SCIATICA: ICD-10-CM

## 2024-11-26 DIAGNOSIS — M54.50 CHRONIC LEFT-SIDED LOW BACK PAIN WITHOUT SCIATICA: ICD-10-CM

## 2024-11-26 NOTE — TELEPHONE ENCOUNTER
Medication Question or Refill        What medication are you calling about (include dose and sig)?: methocarbamol (ROBAXIN) 500 MG tablet     Preferred Pharmacy:   [id number] - [place], [place] - [address] [place] [address] AT [facility] & [address]  [address] [place] [address]  Phone: [phone] Fax: [phone]      Controlled Substance Agreement on file:   CSA -- Patient Level:    CSA: None found at the patient level.       Who prescribed the medication?: PCP    Do you need a refill? Yes    When did you use the medication last? N/A    Patient offered an appointment? No    Do you have any questions or concerns?  No      Okay to leave a detailed message?: Yes at Home number on file [phone] (home)    Yes

## 2024-11-27 RX ORDER — METHOCARBAMOL 500 MG/1
500 TABLET, FILM COATED ORAL 4 TIMES DAILY PRN
Qty: 120 TABLET | Refills: 0 | Status: SHIPPED | OUTPATIENT
Start: 2024-11-27

## 2024-12-09 ENCOUNTER — PATIENT OUTREACH (OUTPATIENT)
Dept: CARE COORDINATION | Facility: CLINIC | Age: 64
End: 2024-12-09
Payer: MEDICARE

## 2024-12-09 NOTE — LETTER
[facility] CARE COORDINATION  [address] [facility] [address]    December 9, 2024    Kerry Reed  [address]  [place] [address]      Dear [place],    I have been unsuccessful in reaching you since our last contact. At this time the Care Coordination team will make no further attempts to reach you, however this does not change your ability to continue receiving care from your providers at your primary care clinic. If you need additional support from a care coordinator in the future please contact me at [phone].    We are focused on providing you with the highest-quality healthcare experience possible.    Sincerely,    Dewayne Peters, [facility]  Social Work Care Cooridinator   [facility]   Phone: [phone]

## 2024-12-12 DIAGNOSIS — R60.0 LOWER EXTREMITY EDEMA: ICD-10-CM

## 2024-12-12 RX ORDER — TRIAMTERENE AND HYDROCHLOROTHIAZIDE 37.5; 25 MG/1; MG/1
1 CAPSULE ORAL DAILY PRN
Qty: 90 CAPSULE | Refills: 1 | Status: SHIPPED | OUTPATIENT
Start: 2024-12-12

## 2024-12-16 DIAGNOSIS — M41.9 SCOLIOSIS OF THORACOLUMBAR SPINE, UNSPECIFIED SCOLIOSIS TYPE: ICD-10-CM

## 2024-12-16 RX ORDER — GABAPENTIN 100 MG/1
100 CAPSULE ORAL 3 TIMES DAILY
Qty: 90 CAPSULE | Refills: 3 | Status: SHIPPED | OUTPATIENT
Start: 2024-12-16

## 2024-12-16 NOTE — TELEPHONE ENCOUNTER
Medication Question or Refill        What medication are you calling about (include dose and sig)?: gabapentin (NEURONTIN) 100 MG capsule     Preferred Pharmacy:   [facility] #[id number] - [place], [place] - [address] [place] [address] AT [facility] & [address]  [address] [place] [address]  Phone: [phone] Fax: [phone]        Controlled Substance Agreement on file:   CSA -- Patient Level:    CSA: None found at the patient level.       Who prescribed the medication?: PCP    Do you need a refill? Yes    When did you use the medication last? N/A    Patient offered an appointment? No    Do you have any questions or concerns?  No      Okay to leave a detailed message?: Yes at Home number on file [phone] (home)

## 2025-01-07 DIAGNOSIS — Z76.0 ENCOUNTER FOR MEDICATION REFILL: ICD-10-CM

## 2025-01-07 DIAGNOSIS — G40.A09 NONINTRACTABLE ABSENCE EPILEPSY WITHOUT STATUS EPILEPTICUS (H): ICD-10-CM

## 2025-01-07 RX ORDER — CARBAMAZEPINE 200 MG/1
TABLET ORAL
Qty: 180 TABLET | Refills: 1 | Status: SHIPPED | OUTPATIENT
Start: 2025-01-07

## 2025-01-07 NOTE — TELEPHONE ENCOUNTER
8/12/24 office visit note reviewed.    SOLIS GarciaN, RN-BC  [facility]

## 2025-01-07 NOTE — TELEPHONE ENCOUNTER
Medication Question or Refill    Contacts       Contact Date/Time Type Contact Phone/Fax    01/07/2025 09:58 AM CST Phone (Incoming) Kerry Reed (Self) [phone] (M)        What medication are you calling about (include dose and sig)?: CARBAMAZEPINE     Preferred Pharmacy:  [facility] #[id number] - [place], [place] - [address]  [address] [place] [address]  Phone: [phone] Fax: [phone]    Controlled Substance Agreement on file:   CSA -- Patient Level:    CSA: None found at the patient level.       Who prescribed the medication?: Dr. Espinoza    Do you need a refill? Yes    When did you use the medication last? Today and has a few pills left.     Patient offered an appointment? No    Do you have any questions or concerns?  No    Okay to leave a detailed message?: Yes at Cell number on file:    Telephone Information:   Mobile [phone]

## 2025-01-09 DIAGNOSIS — Z76.0 ENCOUNTER FOR MEDICATION REFILL: ICD-10-CM

## 2025-01-09 DIAGNOSIS — G40.A09 NONINTRACTABLE ABSENCE EPILEPSY WITHOUT STATUS EPILEPTICUS (H): ICD-10-CM

## 2025-01-09 RX ORDER — CARBAMAZEPINE 200 MG/1
TABLET ORAL
Qty: 180 TABLET | Refills: 1 | OUTPATIENT
Start: 2025-01-09

## 2025-01-14 ENCOUNTER — PATIENT OUTREACH (OUTPATIENT)
Dept: CARE COORDINATION | Facility: CLINIC | Age: 65
End: 2025-01-14
Payer: MEDICARE

## 2025-01-14 NOTE — PROGRESS NOTES
FRW Update  1/14/25 FRW called and patient stated that she just wanted the FRW to know that her case is still pending and she is going to have her sister send in what the county needs today.

## 2025-01-29 DIAGNOSIS — G89.29 CHRONIC LEFT-SIDED LOW BACK PAIN WITHOUT SCIATICA: ICD-10-CM

## 2025-01-29 DIAGNOSIS — E03.8 OTHER SPECIFIED HYPOTHYROIDISM: ICD-10-CM

## 2025-01-29 DIAGNOSIS — M54.50 CHRONIC LEFT-SIDED LOW BACK PAIN WITHOUT SCIATICA: ICD-10-CM

## 2025-01-29 RX ORDER — LEVOTHYROXINE SODIUM 50 MCG
TABLET ORAL
Qty: 90 TABLET | Refills: 1 | Status: SHIPPED | OUTPATIENT
Start: 2025-01-29

## 2025-01-29 RX ORDER — METHOCARBAMOL 500 MG/1
500 TABLET, FILM COATED ORAL 4 TIMES DAILY PRN
Qty: 120 TABLET | Refills: 0 | Status: SHIPPED | OUTPATIENT
Start: 2025-01-29

## 2025-01-29 NOTE — TELEPHONE ENCOUNTER
Medication Question or Refill    Contacts       Contact Date/Time Type Contact Phone/Fax    01/29/2025 01:24 PM CST Phone (Incoming) Kerry Reed (Self) [phone] ()            What medication are you calling about (include dose and sig)?: SYNTHROID 50 MCG tablet   REQUESTING A 90 DAY REFILL  methocarbamol (ROBAXIN) 500 MG tablet     Preferred Pharmacy:   [facility] #[id number] - [place], [place] - [address]  [address] [place] [address]  Phone: [phone] Fax: [phone]         Controlled Substance Agreement on file:   CSA -- Patient Level:    CSA: None found at the patient level.       Who prescribed the medication?: PCP    Do you need a refill? Yes    When did you use the medication last? Getting low on meds    Patient offered an appointment? No    Do you have any questions or concerns?  No      Okay to leave a detailed message?: Yes at Home number on file [phone] ([place])

## 2025-02-10 DIAGNOSIS — G40.A09 NONINTRACTABLE ABSENCE EPILEPSY WITHOUT STATUS EPILEPTICUS (H): ICD-10-CM

## 2025-02-10 RX ORDER — LEVETIRACETAM 500 MG
1000 TABLET, EXTENDED RELEASE 24 HR ORAL 2 TIMES DAILY
Qty: 180 TABLET | Refills: 3 | Status: SHIPPED | OUTPATIENT
Start: 2025-02-10

## 2025-02-10 NOTE — TELEPHONE ENCOUNTER
Medication Question or Refill        What medication are you calling about (include dose and sig)?: KEPPRA  MG 24 hr tablet     Preferred Pharmacy:   [id number] - [place], [place] - [address] [place] [address] [address]  [address] [place] [address]  Phone: [phone] Fax: [phone]          Controlled Substance Agreement on file:   CSA -- Patient Level:    CSA: None found at the patient level.       Who prescribed the medication?: PCP    Do you need a refill? Yes    When did you use the medication last? N/A    Patient offered an appointment? No    Do you have any questions or concerns?  No      Okay to leave a detailed message?: Yes at Cell number on file:    Telephone Information:   Mobile [phone]

## 2025-02-11 ENCOUNTER — TELEPHONE (OUTPATIENT)
Dept: FAMILY MEDICINE | Facility: CLINIC | Age: 65
End: 2025-02-11
Payer: MEDICARE

## 2025-02-12 ENCOUNTER — PATIENT OUTREACH (OUTPATIENT)
Dept: CARE COORDINATION | Facility: CLINIC | Age: 65
End: 2025-02-12
Payer: MEDICARE

## 2025-02-12 NOTE — PROGRESS NOTES
FRW Update   2/12/25 Established patient outreach attempted x 1 was unable to reach. Left message on voicemail with call back information and requested return call.  Plan: Current outreach date reflects FRW 's follow up within 30 days.

## 2025-02-13 NOTE — TELEPHONE ENCOUNTER
Prior Authorization Approval    Medication: KEPPRA  MG PO TB24  Authorization Effective Date: 2/13/2025  Authorization Expiration Date: 12/31/2025  Approved Dose/Quantity: 120/30  Reference #: [id number]   Insurance Company: [employer] - Phone [phone] Fax [phone]  Expected CoPay: $    CoPay Card Available:      Financial Assistance Needed:   Which Pharmacy is filling the prescription: [facility] #[id number] - [place], [place] - [address] [facility]  Pharmacy Notified: YEs  Patient Notified: Yes

## 2025-02-13 NOTE — TELEPHONE ENCOUNTER
PA Initiation    Medication: KEPPRA  MG PO TB24  Insurance Company: [employer] - Phone [phone] Fax [phone]  Pharmacy Filling the Rx: [facility] #[id number] - [place], [place] - [address]  [address] [facility] [address]  Filling Pharmacy Phone: [phone]  Filling Pharmacy Fax:    Start Date: 2/13/2025

## 2025-02-13 NOTE — TELEPHONE ENCOUNTER
PRIOR AUTHORIZATION REQUEST    PA NEEDED FOR: KEPPRA XR 500mg 24 hr tab. Take 2 tabs (1,000mg) by mouth 2 times daily.    REASON / CHIEF COMPLAINT: Epilepsy      (ROUTE PA POOL: [facility] PA)       Pt call, states she only have enough KEPPRA left to last until Saturday, 2/15/25. Generic doesn't work for her. Advised pt , Prior Auth initiated 2/12/24 and may take up to 10 business day. Pt states she can't wait that long. Going without KEPPRA will result in having grand mal seizures.     Re-routing to PA for priority review.      Pepper SON RN  [facility]

## 2025-02-26 ENCOUNTER — TELEPHONE (OUTPATIENT)
Dept: FAMILY MEDICINE | Facility: CLINIC | Age: 65
End: 2025-02-26
Payer: MEDICARE

## 2025-02-26 NOTE — TELEPHONE ENCOUNTER
"Patient calling: Received letter from insurance company stating Keppra coverage was denied.    Writer reviewed chart, MyChart sent by PA team 2/13/25 stating Keppra PA was approved with 30 day/refill quantity limit.   Writer encouraged patient to contact [facility] to see if refill will go through insurance, return call to clinic if denied.  Patient mentioned she has large supply of Keppra on-hand, writer advised insurance will probably deny claim as \"refill too soon\". Call pharmacy to request refills when you have about 2 weeks of medication left, this gives care team time to troubleshoot if further insurance inssues.    Patient expressed frustrations:  PCP should be managing medications/refills, writer should be the one calling [facility].  Patient has barriers to mobility (scoliosis), does not want to travel to [facility] monthly.  Needs refill of carbamazepine called in as well, doesn't understand why she has to call clinic about this every month.    Writer advised:  Discussed scope of prescriber refill management. We can assist with refill orders and troubleshooting prescriptions that won't go through insurance, but don't have the capacity to manage patient's monthly refills.  Suggested transferring to a mail order pharmacy or pharmacy that offers local delivery ([place], [facility], etc). Patient declines, \"I want to stick with [facility].\"  Per chart, [facility] should have carbamazepine refills through 4/7/25. Contact pharmacy to request refill.    Patient verbalizes understanding and agreement.    Penelope Hidalgo RN  [facility]    "

## 2025-03-10 DIAGNOSIS — R06.02 SHORTNESS OF BREATH: ICD-10-CM

## 2025-03-10 DIAGNOSIS — Z76.0 ENCOUNTER FOR MEDICATION REFILL: ICD-10-CM

## 2025-03-10 RX ORDER — ALBUTEROL SULFATE 90 UG/1
2 AEROSOL, METERED RESPIRATORY (INHALATION) EVERY 4 HOURS PRN
Qty: 18 G | Refills: 1 | Status: SHIPPED | OUTPATIENT
Start: 2025-03-10

## 2025-03-20 ENCOUNTER — PATIENT OUTREACH (OUTPATIENT)
Dept: CARE COORDINATION | Facility: CLINIC | Age: 65
End: 2025-03-20
Payer: MEDICARE

## 2025-03-20 NOTE — PROGRESS NOTES
FRW Update  3/20/25 Outreach attempted x 2. Left message on voicemail indicating last outreach attempt.   Plan: FRW closed the FRW program, sent letter. Patient can be referred back to [facility] if needed

## 2025-03-20 NOTE — LETTER
[facility] CARE COORDINATION        March 20, 2025      Kerry Reed  [address]  [place] [address]        Dear [place],                                                                      The Financial Resource team has attempted to reach to you to assist you with any financial barriers you may be facing in your healthcare journey.  We would like to provide any additional support you may need related to financial support for your healthcare.  Our team are Certified MNSure Navigators, and we offer support with application assistance for Medical Assistance, MNSure Applications, Energy Assistance, Emergency Assistance, Food Assistance and many other initial applications for [facility] benefits.     I would appreciate if you would call me at [phone] to let me know if you would like assistance.      Sincerely,                                                                         Lizbeth Colon MA

## 2025-04-01 ENCOUNTER — PATIENT OUTREACH (OUTPATIENT)
Dept: CARE COORDINATION | Facility: CLINIC | Age: 65
End: 2025-04-01
Payer: MEDICARE

## 2025-04-08 ENCOUNTER — PATIENT OUTREACH (OUTPATIENT)
Dept: CARE COORDINATION | Facility: CLINIC | Age: 65
End: 2025-04-08
Payer: MEDICARE

## 2025-04-08 DIAGNOSIS — Z71.89 COUNSELING AND COORDINATION OF CARE: ICD-10-CM

## 2025-04-08 DIAGNOSIS — F41.9 ANXIETY: Primary | ICD-10-CM

## 2025-04-10 ENCOUNTER — PATIENT OUTREACH (OUTPATIENT)
Dept: CARE COORDINATION | Facility: CLINIC | Age: 65
End: 2025-04-10
Payer: MEDICARE

## 2025-04-10 NOTE — PROGRESS NOTES
Clinic Care Coordination Contact:  [facility]/Voicemail    Today's date: 4/10/2025     Reason: CCC CHW Initial Outreach Called- Referral to Clinic Care Coordination (CCC) Service  Referral provider/name: Ada Espinoza MD     Message below (screenshot) received from Lizbeth LI today, 4/10/2025 via Secure chat:       Clinical Data: Care Coordinator Outreach:    Outreach Documentation Number of Outreach Attempt   4/10/2025  10:55 AM 1     Potential Patient Outreach:   Attempted #1: Left message on patient's voicemail with call back information and requested return call.    Plan:   Attempt #2: Care Coordinator will try to reach patient again in 1-2 business days.

## 2025-04-10 NOTE — PROGRESS NOTES
MARCO Update   4/10/25 MARCO received a call form patient  she had questions regarding medicare/[employer] with cost and a Discount book she got in the mail; MARCO gave the patient the number for the senior linkage line [phone] and they have medicare specialist that will be able to help her with her questions. She requested support this that so MARCO sent a message to the CHW to let her know. MARCO also called [facility] to find out status of application. MARCO mailed out [facility] application.

## 2025-04-27 DIAGNOSIS — M41.9 SCOLIOSIS OF THORACOLUMBAR SPINE, UNSPECIFIED SCOLIOSIS TYPE: ICD-10-CM

## 2025-04-27 RX ORDER — GABAPENTIN 100 MG/1
100 CAPSULE ORAL 3 TIMES DAILY
Qty: 90 CAPSULE | Refills: 1 | Status: SHIPPED | OUTPATIENT
Start: 2025-04-27

## 2025-04-28 ENCOUNTER — TELEPHONE (OUTPATIENT)
Dept: FAMILY MEDICINE | Facility: CLINIC | Age: 65
End: 2025-04-28
Payer: MEDICARE

## 2025-04-28 DIAGNOSIS — M54.50 CHRONIC LEFT-SIDED LOW BACK PAIN WITHOUT SCIATICA: ICD-10-CM

## 2025-04-28 DIAGNOSIS — G89.29 CHRONIC LEFT-SIDED LOW BACK PAIN WITHOUT SCIATICA: ICD-10-CM

## 2025-04-28 RX ORDER — METHOCARBAMOL 500 MG/1
500 TABLET, FILM COATED ORAL 4 TIMES DAILY PRN
Qty: 120 TABLET | Refills: 0 | Status: SHIPPED | OUTPATIENT
Start: 2025-04-28

## 2025-04-28 NOTE — TELEPHONE ENCOUNTER
Medication Question or Refill        What medication are you calling about (include dose and sig)?: methocarbamol (ROBAXIN) 500 MG tablet     Preferred Pharmacy:   [facility] #[id number] - [place], [place] - [address] [place] [address] & [address]  [address] [place] [address]  Phone: [phone] Fax: [phone]      Controlled Substance Agreement on file:   CSA -- Patient Level:    CSA: None found at the patient level.       Who prescribed the medication?: PCP    Do you need a refill? Yes    When did you use the medication last? N/A    Patient offered an appointment? No    Do you have any questions or concerns?  No      Okay to leave a detailed message?: Yes at Cell number on file:    Telephone Information:   Mobile [phone]       Yes

## 2025-04-28 NOTE — TELEPHONE ENCOUNTER
"Dr. Espinoza-please review and advise.    Call received from patient:  Wants Gabapentin dose increased  Taking two tablets (200 mg) BID  Made this dose adjustment by herself  Feels it works better than Methocarbamol and she feels more like herself    Writer recommended visit with ideally PCP to discuss dose adjustment/increase request and offered several options with Dr. Espinoza today.  Patient declined all appointments, informed writer she is \"booked all week\" and wants request sent to Dr. Espinoza.    Per chart review, patient's last visit with PCP was 8/12/24.    Thank you!  SOLIS GarciaN, RN-[facility]        "

## 2025-04-30 ENCOUNTER — TELEPHONE (OUTPATIENT)
Dept: FAMILY MEDICINE | Facility: CLINIC | Age: 65
End: 2025-04-30
Payer: MEDICARE

## 2025-04-30 NOTE — TELEPHONE ENCOUNTER
"Pt reports, her insurance is refusing refills of Keppra and pharmacy instructed pt to call clinic. Pt also states that the last time this happened, \"Dr Gutiérrez had to called my insurance to get my insurance to get my medication\".     Writer spoke to pharmacist Justice at [facility], states insurances is not allowing refill with reason of refill too soon, but last fill was on 2/16/25 for 15 days supply only but insurance may have listed as full fill.  During conversation, Justice resubmitted refill under a new \"over-ride code\" and refill went through, will have medications ready for  today. Justice verb they will still call insurance to follow-up on quantity filled.     Called pt, relayed pharmacist message. Advised pt to call pharmacist for pick-up.       Pepper SON RN  [facility]      "

## 2025-05-08 ENCOUNTER — PATIENT OUTREACH (OUTPATIENT)
Dept: CARE COORDINATION | Facility: CLINIC | Age: 65
End: 2025-05-08
Payer: MEDICARE

## 2025-05-08 NOTE — PROGRESS NOTES
FRW Update   5/8/25 Established patient outreach attempted x 1 was unable to reach. Left message on voicemail with call back information and requested return call.  Plan: Current outreach date reflects FRW 's follow up within 30 days.

## 2025-05-27 DIAGNOSIS — M41.9 SCOLIOSIS OF THORACOLUMBAR SPINE, UNSPECIFIED SCOLIOSIS TYPE: ICD-10-CM

## 2025-05-27 DIAGNOSIS — Z76.0 ENCOUNTER FOR MEDICATION REFILL: ICD-10-CM

## 2025-05-27 DIAGNOSIS — R06.02 SHORTNESS OF BREATH: ICD-10-CM

## 2025-05-27 RX ORDER — ALBUTEROL SULFATE 90 UG/1
2 INHALANT RESPIRATORY (INHALATION) EVERY 4 HOURS PRN
Qty: 18 G | Refills: 1 | Status: SHIPPED | OUTPATIENT
Start: 2025-05-27

## 2025-05-27 RX ORDER — GABAPENTIN 100 MG/1
100 CAPSULE ORAL 3 TIMES DAILY
Qty: 90 CAPSULE | Refills: 1 | Status: SHIPPED | OUTPATIENT
Start: 2025-05-27

## 2025-05-27 NOTE — TELEPHONE ENCOUNTER
Completed the albuterol rx.  Gabapentin is not on the refill protocol.  Sending to PCP.  Quoc Varner RN  [facility]/ [phone]

## 2025-05-27 NOTE — TELEPHONE ENCOUNTER
Medication Question or Refill        What medication are you calling about (include dose and sig)?:   gabapentin (NEURONTIN) 100 MG capsule   VENTOLIN  (90 Base) MCG/ACT inhaler     Preferred Pharmacy:   [facility] #[id number] - [place], [place] - [address]  [address] [place] [address]  Phone: [phone] Fax: [phone]    Controlled Substance Agreement on file:   CSA -- Patient Level:    CSA: None found at the patient level.       Who prescribed the medication?: PCP    Do you need a refill? Yes    When did you use the medication last? NA    Patient offered an appointment? No    Do you have any questions or concerns?  Yes: Patient stated that she is taking 2 of her gabapentin (NEURONTIN) 100 MG capsule due to her back pain as 1 tablet isn't enough so she needs a refill now.      Okay to leave a detailed message?: Yes at Home number on file [phone] (home)

## 2025-06-05 ENCOUNTER — PATIENT OUTREACH (OUTPATIENT)
Dept: CARE COORDINATION | Facility: CLINIC | Age: 65
End: 2025-06-05
Payer: MEDICARE

## 2025-06-19 ENCOUNTER — PATIENT OUTREACH (OUTPATIENT)
Dept: CARE COORDINATION | Facility: CLINIC | Age: 65
End: 2025-06-19
Payer: MEDICARE

## 2025-06-19 NOTE — LETTER
[facility] CARE COORDINATION        June 19, 2025      Kerry Reed  [address]  [place] [address]        Dear [place],                                                                      The Financial Resource team has attempted to reach to you to assist you with any financial barriers you may be facing in your healthcare journey.  We would like to provide any additional support you may need related to financial support for your healthcare.  Our team are Certified MNSure Navigators, and we offer support with application assistance for Medical Assistance, MNSure Applications, Energy Assistance, Emergency Assistance, Food Assistance and many other initial applications for [facility] benefits.     I would appreciate if you would call me at [phone] to let me know if you would like assistance.      Sincerely,                                                                         Lolis Martinez  Financial Resource Worker  [facility] Care Coordination  [phone]

## 2025-06-19 NOTE — PROGRESS NOTES
FRW UPDATE :  6/19/25 - Established outreach attempted x 2. Left message on voicemail indicating last outreach attempt.   Plan: FRW closed the FRW program, FAM Case, FAM Tracker and will send an unreachable letter. Patient can be referred back to FRW if needed.

## 2025-06-19 NOTE — LETTER
[facility] CARE COORDINATION        June 19, 2025      Kerry Reed  [address]  [place] [address]        Dear [place],                                                                      The Financial Resource team has attempted to reach to you to assist you with any financial barriers you may be facing in your healthcare journey.  We would like to provide any additional support you may need related to financial support for your healthcare.  Our team are Certified MNSure Navigators, and we offer support with application assistance for Medical Assistance, MNSure Applications, Energy Assistance, Emergency Assistance, Food Assistance and many other initial applications for [facility] benefits.     I would appreciate if you would call me at [phone] to let me know if you would like assistance.      Sincerely,                                                                         Lolis Martinez  Financial Resource Worker  [facility] Care Coordination  [phone]

## 2025-06-23 DIAGNOSIS — M41.9 SCOLIOSIS OF THORACOLUMBAR SPINE, UNSPECIFIED SCOLIOSIS TYPE: ICD-10-CM

## 2025-06-23 RX ORDER — GABAPENTIN 100 MG/1
CAPSULE ORAL
Qty: 360 CAPSULE | Refills: 1 | Status: SHIPPED | OUTPATIENT
Start: 2025-06-23

## 2025-06-23 NOTE — TELEPHONE ENCOUNTER
RN attempted to contact patient, but no answer. Left message on patient's voicemail that Rx for gabapentin is refilled and will be ready for   No further action needed    Zee Oh RN  [facility]

## 2025-06-23 NOTE — TELEPHONE ENCOUNTER
Medication Refill    Contacts       Contact Date/Time Type Contact Phone/Fax    06/23/2025 08:54 AM CDT Phone (Incoming) Kerry Reed (Self) [phone] (M)        What medication are you calling about (include dose and sig)?:    Disp Refills Start End RUDY   gabapentin (NEURONTIN) 100 MG capsule 90 capsule 1 5/27/2025 -- No   Sig - Route: Take 1 capsule (100 mg) by mouth 3 times daily.          Preferred Pharmacy:     [facility] #[id number] - [place], [place] - [address] [place] [address] AT [facility] OF [address]  [address] [place] [address]  Phone: [phone] Fax: [phone]    Controlled Substance Agreement on file:   CSA -- Patient Level:    CSA: None found at the patient level.       Who prescribed the medication?: Dr Gutiérrez    Do you need a refill? Yes    Patient offered an appointment? No  Patient has an appointment scheduled on 6/30/25 with PCP, Dr Gutiérrez    Do you have any questions or concerns?  Yes: Patient called on 4/25/25 for a dose increase. Patient increased medication and now out of refills early.    Patient requesting a dose increase.  See TE dated 4/28/25 below    4/28/25  Dr. Espinoza-please review and advise.     Call received from patient:  Wants Gabapentin dose increased  Taking two tablets (200 mg) BID  Made this dose adjustment by herself  Feels it works better than Methocarbamol and she feels more like herself          Message / refill sent to Dr Jermaine SAINZ as Dr Gutiérrez out of clinic    Zee VALLEJO [facility]

## 2025-06-26 ENCOUNTER — PATIENT OUTREACH (OUTPATIENT)
Dept: CARE COORDINATION | Facility: CLINIC | Age: 65
End: 2025-06-26
Payer: MEDICARE

## 2025-06-26 DIAGNOSIS — F41.9 ANXIETY: Primary | ICD-10-CM

## 2025-06-26 NOTE — PROGRESS NOTES
FRW Update   6/26/25 FRW received call from the patient and she wanted help with the premiums of the insurance patient was dined all county benefits due to income and FRW not able to help at this time

## 2025-06-30 ENCOUNTER — TELEPHONE (OUTPATIENT)
Dept: FAMILY MEDICINE | Facility: CLINIC | Age: 65
End: 2025-06-30

## 2025-06-30 ENCOUNTER — PATIENT OUTREACH (OUTPATIENT)
Dept: CARE COORDINATION | Facility: CLINIC | Age: 65
End: 2025-06-30

## 2025-06-30 ENCOUNTER — OFFICE VISIT (OUTPATIENT)
Dept: FAMILY MEDICINE | Facility: CLINIC | Age: 65
End: 2025-06-30
Payer: MEDICARE

## 2025-06-30 VITALS
OXYGEN SATURATION: 96 % | DIASTOLIC BLOOD PRESSURE: 72 MMHG | HEIGHT: 59 IN | TEMPERATURE: 97.7 F | SYSTOLIC BLOOD PRESSURE: 104 MMHG | WEIGHT: 125 LBS | BODY MASS INDEX: 25.2 KG/M2 | HEART RATE: 76 BPM

## 2025-06-30 DIAGNOSIS — M41.9 SCOLIOSIS OF THORACOLUMBAR SPINE, UNSPECIFIED SCOLIOSIS TYPE: Primary | ICD-10-CM

## 2025-06-30 DIAGNOSIS — M51.362 DEGENERATION OF INTERVERTEBRAL DISC OF LUMBAR REGION WITH DISCOGENIC BACK PAIN AND LOWER EXTREMITY PAIN: ICD-10-CM

## 2025-06-30 DIAGNOSIS — G40.A09 NONINTRACTABLE ABSENCE EPILEPSY WITHOUT STATUS EPILEPTICUS (H): ICD-10-CM

## 2025-06-30 DIAGNOSIS — G89.29 CHRONIC LEFT-SIDED LOW BACK PAIN WITHOUT SCIATICA: ICD-10-CM

## 2025-06-30 DIAGNOSIS — M54.50 CHRONIC LEFT-SIDED LOW BACK PAIN WITHOUT SCIATICA: ICD-10-CM

## 2025-06-30 DIAGNOSIS — E03.8 OTHER SPECIFIED HYPOTHYROIDISM: ICD-10-CM

## 2025-06-30 DIAGNOSIS — Z23 NEED FOR VACCINATION: ICD-10-CM

## 2025-06-30 DIAGNOSIS — Z13.220 LIPID SCREENING: ICD-10-CM

## 2025-06-30 DIAGNOSIS — G89.4 CHRONIC PAIN SYNDROME: ICD-10-CM

## 2025-06-30 DIAGNOSIS — Z87.891 PERSONAL HISTORY OF NICOTINE DEPENDENCE: ICD-10-CM

## 2025-06-30 DIAGNOSIS — R73.9 ELEVATED RANDOM BLOOD GLUCOSE LEVEL: ICD-10-CM

## 2025-06-30 LAB
CARBAMAZEPINE SERPL-MCNC: 10 UG/ML (ref 4–12)
EST. AVERAGE GLUCOSE BLD GHB EST-MCNC: 108 MG/DL
HBA1C MFR BLD: 5.4 % (ref 0–5.6)
LEVETIRACETAM SERPL-MCNC: 60.1 ÂΜG/ML (ref 10–40)

## 2025-06-30 PROCEDURE — 90677 PCV20 VACCINE IM: CPT | Performed by: FAMILY MEDICINE

## 2025-06-30 PROCEDURE — 80053 COMPREHEN METABOLIC PANEL: CPT | Performed by: FAMILY MEDICINE

## 2025-06-30 PROCEDURE — 3078F DIAST BP <80 MM HG: CPT | Performed by: FAMILY MEDICINE

## 2025-06-30 PROCEDURE — G0009 ADMIN PNEUMOCOCCAL VACCINE: HCPCS | Performed by: FAMILY MEDICINE

## 2025-06-30 PROCEDURE — 36415 COLL VENOUS BLD VENIPUNCTURE: CPT | Performed by: FAMILY MEDICINE

## 2025-06-30 PROCEDURE — 84443 ASSAY THYROID STIM HORMONE: CPT | Performed by: FAMILY MEDICINE

## 2025-06-30 PROCEDURE — 80177 DRUG SCRN QUAN LEVETIRACETAM: CPT | Performed by: FAMILY MEDICINE

## 2025-06-30 PROCEDURE — 83036 HEMOGLOBIN GLYCOSYLATED A1C: CPT | Performed by: FAMILY MEDICINE

## 2025-06-30 PROCEDURE — G2211 COMPLEX E/M VISIT ADD ON: HCPCS | Performed by: FAMILY MEDICINE

## 2025-06-30 PROCEDURE — 80061 LIPID PANEL: CPT | Performed by: FAMILY MEDICINE

## 2025-06-30 PROCEDURE — 80156 ASSAY CARBAMAZEPINE TOTAL: CPT | Performed by: FAMILY MEDICINE

## 2025-06-30 PROCEDURE — 3074F SYST BP LT 130 MM HG: CPT | Performed by: FAMILY MEDICINE

## 2025-06-30 PROCEDURE — 99214 OFFICE O/P EST MOD 30 MIN: CPT | Performed by: FAMILY MEDICINE

## 2025-06-30 RX ORDER — LIDOCAINE 50 MG/G
1 PATCH TOPICAL EVERY 24 HOURS
Qty: 30 PATCH | Refills: 3 | Status: SHIPPED | OUTPATIENT
Start: 2025-06-30

## 2025-06-30 RX ORDER — GABAPENTIN 300 MG/1
300 CAPSULE ORAL 2 TIMES DAILY
Qty: 180 CAPSULE | Refills: 2 | Status: SHIPPED | OUTPATIENT
Start: 2025-06-30

## 2025-06-30 RX ORDER — DULOXETIN HYDROCHLORIDE 20 MG/1
20 CAPSULE, DELAYED RELEASE ORAL DAILY
Qty: 90 CAPSULE | Refills: 1 | Status: SHIPPED | OUTPATIENT
Start: 2025-06-30

## 2025-06-30 NOTE — LETTER
[facility] CARE COORDINATION  [address] [place] [address]     June 30, 2025    Kerry Reed  [address]  [place] [address]      Dear [place],    I am a clinic community health worker who works with CONNOR TIWARI MD with the [facility]. I have been trying to reach you recently to introduce Clinic Care Coordination. Below is a description of clinic care coordination and how I can further assist you.       The clinic care coordination team is made up of a registered nurse, , financial resource worker and community health worker who understand the health care system. The goal of clinic care coordination is to help you manage your health and improve access to the health care system. Our team works alongside your provider to assist you in determining your health and social needs. We can help you obtain health care and community resources, providing you with necessary information and education. We can work with you through any barriers and develop a care plan that helps coordinate and strengthen the communication between you and your care team.  Our services are voluntary and are offered without charge to you personally.    Please feel free to contact me with any questions or concerns regarding care coordination and what we can offer.      We are focused on providing you with the highest-quality healthcare experience possible. Thank you.      Sincerely,     Renea Martinez Community Health Worker (CHW Certified)  [facility] Care Coordination  Office: [phone]  Clinic: [phone]

## 2025-06-30 NOTE — PROGRESS NOTES
Prior to immunization administration, verified patients identity using patient s name and date of birth. Please see Immunization Activity for additional information.     Screening Questionnaire for Adult Immunization    Are you sick today?   No   Do you have allergies to medications, food, a vaccine component or latex?   Yes   Have you ever had a serious reaction after receiving a vaccination?   No   Do you have a long-term health problem with heart, lung, kidney, or metabolic disease (e.g., diabetes), asthma, a blood disorder, no spleen, complement component deficiency, a cochlear implant, or a spinal fluid leak?  Are you on long-term aspirin therapy?   No   Do you have cancer, leukemia, HIV/AIDS, or any other immune system problem?   No   Do you have a parent, brother, or sister with an immune system problem?   No   In the past 3 months, have you taken medications that affect  your immune system, such as prednisone, other steroids, or anticancer drugs; drugs for the treatment of rheumatoid arthritis, Crohn s disease, or psoriasis; or have you had radiation treatments?   No   Have you had a seizure, or a brain or other nervous system problem?   Yes   During the past year, have you received a transfusion of blood or blood    products, or been given immune (gamma) globulin or antiviral drug?   No   For women: Are you pregnant or is there a chance you could become       pregnant during the next month?   No   Have you received any vaccinations in the past 4 weeks?   No     Immunization questionnaire was positive for at least one answer.  Notified .      Patient instructed to remain in clinic for 15 minutes afterwards, and to report any adverse reactions.     Screening performed by GENEVIEVE Gilbert MA on 6/30/2025 at 11:06 AM.   Answers submitted by the patient for this visit:  Back Pain Visit Questionnaire (Submitted on 6/30/2025)  Your back pain is: chronic  Chronic or Recurring Back Pain Visit Questionnaire  (Submitted on 6/30/2025)  Where is your back pain located? : right lower back  How would you describe your back pain? : stabbing  Where does your back pain spread? : nowhere  Since you noticed your back pain, how has it changed? : rapidly worsening  Does your back pain interfere with your job?: Not applicable  General Questionnaire (Submitted on 6/30/2025)  Chief Complaint: Chronic problems general questions HPI Form  How many servings of fruits and vegetables do you eat daily?: 0-1  On average, how many sweetened beverages do you drink each day (Examples: soda, juice, sweet tea, etc.  Do NOT count diet or artificially sweetened beverages)?: 4  How many minutes a day do you exercise enough to make your heart beat faster?: 9 or less  How many days a week do you exercise enough to make your heart beat faster?: 3 or less  How many days per week do you miss taking your medication?: 0  Questionnaire about: Chronic problems general questions HPI Form (Submitted on 6/30/2025)  Chief Complaint: Chronic problems general questions HPI Form

## 2025-06-30 NOTE — TELEPHONE ENCOUNTER
Prior Authorization Retail Medication Request    Medication/Dose:   Diagnosis and ICD code (if different than what is on RX):    lidocaine (LIDODERM) 5 % patch  3 ordered       1 patch, EVERY 24 HOURS        New/renewal/insurance change PA/secondary ins. PA:  Previously Tried and Failed:    Rationale:  insurance requires a PA    Insurance   Primary: Medicare  Insurance ID:  [id number]    Secondary (if applicable):  Insurance ID:      Pharmacy Information (if different than what is on RX)          Name:  same  Phone:    Fax:    Clinic Information  Preferred routing pool for dept communication: [facility] PRIMARY CARE CLINIC POOL     [email or web address]  Edmondson:[id number]  Last name:Derek  :

## 2025-06-30 NOTE — PROGRESS NOTES
"  {PROVIDER CHARTING PREFERENCE:884636}    Subjective   [place] is a 65 year old, presenting for the following health issues:  Recheck Medication and bacl pain         6/30/2025    11:02 AM   Additional Questions   Roomed by m   Accompanied by sister     Was taking more Gabapentin than prescribed - taking 2 am, 2 pm  For back pain   Takes Methocarbamol in evening because \"it makes me stupid\" - affects her speech   Makes her dizzy     Smokes pot for her pain   \"I'm 65 and I deserve it\"  Takes the gummies too - they help at night -   Getting leg cramps  Swollen feet - diet is \"atrocious\" -   Lots of Dr. Pepper, doesn't eat until after lunch time  Sister makes balanced meals - she won't eat them;  \"I don't eat breakfast\" - sister says she drinks pop instead    Has 1 daughter    Back pain - doesn't want any surgical intervention    Smokes marijuana, vapes marijuana/nicotine            History of Present Illness       Back Pain:  She presents for follow up of back pain. Patient's back pain is a chronic problem.  Location of back pain:  Right lower back  Description of back pain: stabbing  Back pain spreads: nowhere    Since patient first noticed back pain, pain is: rapidly worsening  Does back pain interfere with her job:  Not applicable       She eats 0-1 servings of fruits and vegetables daily.She consumes 4 sweetened beverage(s) daily.She exercises with enough effort to increase her heart rate 9 or less minutes per day.  She exercises with enough effort to increase her heart rate 3 or less days per week.   She is taking medications regularly.        {MA/LPN/RN Pre-Provider Visit Orders- hCG/UA/Strep (Optional):256130}  {SUPERLIST (Optional):489264}  {additonal problems for provider to add (Optional):856566}    {ROS Picklists (Optional):856872}      Objective    /72 (BP Location: Right arm, Patient Position: Sitting, Cuff Size: Adult Small)   Pulse 76   Temp 97.7  F (36.5  C) (Temporal)   Ht 1.49 m (4' " "10.66\")   Wt 56.7 kg (125 lb)   LMP  (LMP Unknown)   SpO2 96%   BMI 25.54 kg/m    Body mass index is 25.54 kg/m .  Physical Exam   {Exam List (Optional):653102}    {Diagnostic Test Results (Optional):068871}        Signed Electronically by: CONNOR TIWARI MD  {Email feedback regarding this note to [email or web address]   :522667}  " "them;  \"I don't eat breakfast\" - sister says she drinks pop instead    Has 1 daughter    Back pain - doesn't want any surgical intervention    Smokes marijuana, vapes marijuana/nicotine            History of Present Illness       Back Pain:  She presents for follow up of back pain. Patient's back pain is a chronic problem.  Location of back pain:  Right lower back  Description of back pain: stabbing  Back pain spreads: nowhere    Since patient first noticed back pain, pain is: rapidly worsening  Does back pain interfere with her job:  Not applicable       She eats 0-1 servings of fruits and vegetables daily.She consumes 4 sweetened beverage(s) daily.She exercises with enough effort to increase her heart rate 9 or less minutes per day.  She exercises with enough effort to increase her heart rate 3 or less days per week.   She is taking medications regularly.        Review of Systems   Constitutional:  Negative for chills and fever.   HENT:  Negative for ear pain and sore throat.    Eyes:  Negative for pain and visual disturbance.   Respiratory:  Negative for cough and shortness of breath.    Cardiovascular:  Negative for chest pain and palpitations.   Gastrointestinal:  Negative for abdominal pain and vomiting.   Genitourinary:  Negative for dysuria and hematuria.   Musculoskeletal:  Positive for back pain and neck pain. Negative for arthralgias.   Skin:  Negative for color change and rash.   Neurological:  Positive for seizures and headaches. Negative for syncope.   All other systems reviewed and are negative.          Objective    /72 (BP Location: Right arm, Patient Position: Sitting, Cuff Size: Adult Small)   Pulse 76   Temp 97.7  F (36.5  C) (Temporal)   Ht 1.49 m (4' 10.66\")   Wt 56.7 kg (125 lb)   LMP  (LMP Unknown)   SpO2 96%   BMI 25.54 kg/m    Body mass index is 25.54 kg/m .  Physical Exam  Vitals reviewed.   Constitutional:       General: She is not in acute distress.     Appearance: Normal " appearance.   HENT:      Head: Normocephalic.      Right Ear: Tympanic membrane, ear canal and external ear normal.      Left Ear: Tympanic membrane, ear canal and external ear normal.      Nose: Nose normal.      Mouth/Throat:      Mouth: Mucous membranes are moist.      Pharynx: No posterior oropharyngeal erythema.   Eyes:      Extraocular Movements: Extraocular movements intact.      Conjunctiva/sclera: Conjunctivae normal.      Pupils: Pupils are equal, round, and reactive to light.   Cardiovascular:      Rate and Rhythm: Normal rate and regular rhythm.      Pulses: Normal pulses.      Heart sounds: Normal heart sounds. No murmur heard.  Pulmonary:      Effort: Pulmonary effort is normal.      Breath sounds: Normal breath sounds.   Abdominal:      Palpations: Abdomen is soft. There is no mass.      Tenderness: There is no abdominal tenderness. There is no guarding or rebound.   Musculoskeletal:         General: Deformity (severe scoliosis) present. Normal range of motion.      Cervical back: Normal range of motion and neck supple.   Lymphadenopathy:      Cervical: No cervical adenopathy.   Skin:     General: Skin is warm and dry.   Neurological:      General: No focal deficit present.      Mental Status: She is alert.   Psychiatric:         Mood and Affect: Mood normal.         Behavior: Behavior normal.            Results for orders placed or performed in visit on 06/30/25   Comprehensive metabolic panel (BMP + Alb, Alk Phos, ALT, AST, Total. Bili, TP)     Status: Abnormal   Result Value Ref Range    Sodium 140 135 - 145 mmol/L    Potassium 4.6 3.4 - 5.3 mmol/L    Carbon Dioxide (CO2) 28 22 - 29 mmol/L    Anion Gap 12 7 - 15 mmol/L    Urea Nitrogen 13.6 8.0 - 23.0 mg/dL    Creatinine 1.01 (H) 0.51 - 0.95 mg/dL    GFR Estimate 61 >60 mL/min/1.73m2    Calcium 9.0 8.8 - 10.4 mg/dL    Chloride 100 98 - 107 mmol/L    Glucose 84 70 - 99 mg/dL    Alkaline Phosphatase 103 40 - 150 U/L    AST 34 0 - 45 U/L    ALT 28 0 -  50 U/L    Protein Total 7.3 6.4 - 8.3 g/dL    Albumin 4.2 3.5 - 5.2 g/dL    Bilirubin Total <0.2 <=1.2 mg/dL    Patient Fasting > 8hrs? No    TSH with free T4 reflex     Status: Normal   Result Value Ref Range    TSH 1.47 0.30 - 4.20 uIU/mL   Lipid Profile (Chol, Trig, HDL, LDL calc)     Status: None   Result Value Ref Range    Cholesterol 183 <200 mg/dL    Triglycerides 121 <150 mg/dL    Direct Measure HDL 62 >=50 mg/dL    LDL Cholesterol Calculated 97 <100 mg/dL    Non HDL Cholesterol 121 <130 mg/dL    Patient Fasting > 8hrs? No     Narrative    Cholesterol  Desirable: < 200 mg/dL  Borderline High: 200 - 239 mg/dL  High: >= 240 mg/dL    Triglycerides  Normal: < 150 mg/dL  Borderline High: 150 - 199 mg/dL  High: 200-499 mg/dL  Very High: >= 500 mg/dL    Direct Measure HDL  Female: >= 50 mg/dL   Male: >= 40 mg/dL    LDL Cholesterol  Desirable: < 100 mg/dL  Above Desirable: 100 - 129 mg/dL   Borderline High: 130 - 159 mg/dL   High:  160 - 189 mg/dL   Very High: >= 190 mg/dL    Non HDL Cholesterol  Desirable: < 130 mg/dL  Above Desirable: 130 - 159 mg/dL  Borderline High: 160 - 189 mg/dL  High: 190 - 219 mg/dL  Very High: >= 220 mg/dL   Keppra (Levetiracetam) Level     Status: Abnormal   Result Value Ref Range    Keppra (Levetiracetam) Level 60.1 (H) 10.0 - 40.0  g/mL   Carbamazepine total     Status: Normal   Result Value Ref Range    Carbamazepine 10.0 4.0 - 12.0 ug/mL   Hemoglobin A1c     Status: Normal   Result Value Ref Range    Estimated Average Glucose 108 <117 mg/dL    Hemoglobin A1C 5.4 0.0 - 5.6 %           Signed Electronically by: CONNOR TIWARI MD

## 2025-06-30 NOTE — PATIENT INSTRUCTIONS
Gabapentin:  Take 300 mg in the morning and in the evening (twice a day) - this is a new dose sent to pharmacy today  Take 100-200 mg (1-2 of the 100 mg tablets) in the middle of the day as needed.    Duloxetine (for pain):  Take 1 tablet daily in morning    Get shingles vaccine at pharmacy (I sent an order)

## 2025-06-30 NOTE — PROGRESS NOTES
Clinic Care Coordination Contact:  [facility]/Voicemail    Today's date: 6/30/2025     Reason: CC CHW Initial Outreach Called- Referral to Care Coordination (CC) Service  Referral provider/name: Ada Espinoza MD     Clinical Data: Care Coordinator Outreach:    Outreach Documentation Number of Outreach Attempt   6/27/2025   2:58 PM 1   6/27/2025   3:19 PM 2   6/30/2025   9:22 AM 2     Potential Patient Outreach:  Attempted #2: Left message on patient's voicemail with call back information and requested return call.    Plan:   -Care Coordinator will send care coordination introduction letter with care coordinator contact information and explanation of care coordination services via mail. Care Coordinator will do no further outreaches at this time.      Renea Martinez Community Health Worker (CHW Certified)  [facility] Care Coordination  Office: [phone]  Clinic: [phone]

## 2025-07-01 ENCOUNTER — TELEPHONE (OUTPATIENT)
Dept: FAMILY MEDICINE | Facility: CLINIC | Age: 65
End: 2025-07-01
Payer: MEDICARE

## 2025-07-01 LAB
ALBUMIN SERPL BCG-MCNC: 4.2 G/DL (ref 3.5–5.2)
ALP SERPL-CCNC: 103 U/L (ref 40–150)
ALT SERPL W P-5'-P-CCNC: 28 U/L (ref 0–50)
ANION GAP SERPL CALCULATED.3IONS-SCNC: 12 MMOL/L (ref 7–15)
AST SERPL W P-5'-P-CCNC: 34 U/L (ref 0–45)
BILIRUB SERPL-MCNC: <0.2 MG/DL
BUN SERPL-MCNC: 13.6 MG/DL (ref 8–23)
CALCIUM SERPL-MCNC: 9 MG/DL (ref 8.8–10.4)
CHLORIDE SERPL-SCNC: 100 MMOL/L (ref 98–107)
CHOLEST SERPL-MCNC: 183 MG/DL
CREAT SERPL-MCNC: 1.01 MG/DL (ref 0.51–0.95)
EGFRCR SERPLBLD CKD-EPI 2021: 61 ML/MIN/1.73M2
FASTING STATUS PATIENT QL REPORTED: NO
FASTING STATUS PATIENT QL REPORTED: NO
GLUCOSE SERPL-MCNC: 84 MG/DL (ref 70–99)
HCO3 SERPL-SCNC: 28 MMOL/L (ref 22–29)
HDLC SERPL-MCNC: 62 MG/DL
LDLC SERPL CALC-MCNC: 97 MG/DL
NONHDLC SERPL-MCNC: 121 MG/DL
POTASSIUM SERPL-SCNC: 4.6 MMOL/L (ref 3.4–5.3)
PROT SERPL-MCNC: 7.3 G/DL (ref 6.4–8.3)
SODIUM SERPL-SCNC: 140 MMOL/L (ref 135–145)
TRIGL SERPL-MCNC: 121 MG/DL
TSH SERPL DL<=0.005 MIU/L-ACNC: 1.47 UIU/ML (ref 0.3–4.2)

## 2025-07-01 NOTE — TELEPHONE ENCOUNTER
Patient Quality Outreach    Patient is due for the following:   Breast Cancer Screening - Mammogram    Action(s) Taken:   See below    Type of outreach:    No mammogram outreach done.  Pt had mammogram automated outreach on 6/5/25.    Questions for provider review:    None         Andrea Behrend, ARRT  Chart routed to None  .

## 2025-07-01 NOTE — TELEPHONE ENCOUNTER
Central Prior Authorization Team   Phone: [phone]    PA Initiation    Medication: lidocaine (LIDODERM) 5 % patch  Insurance Company: [employer] - Phone [phone] Fax [phone]  Pharmacy Filling the Rx: [facility] #[id number] - [place], [place] - [address] [place] [address] [address]  Filling Pharmacy Phone: [phone]  Filling Pharmacy Fax:    Start Date: 7/1/2025    [facility] KEY: [id number]

## 2025-07-02 NOTE — TELEPHONE ENCOUNTER
Prior Authorization Approval    Authorization Effective Date: 7/2/2025  Authorization Expiration Date: 12/31/2025  Medication: lidocaine (LIDODERM) 5 % patch  Reference #:     Insurance Company: [employer] - Phone [phone] Fax [phone]  Which Pharmacy is filling the prescription (Not needed for infusion/clinic administered): [id number] - [place], [place] - [address] [place] [address] [address]  Pharmacy Notified: Yes  Patient Notified: Instructed pharmacy to notify patient when script is ready to /ship.

## 2025-07-10 DIAGNOSIS — M41.9 SCOLIOSIS OF THORACOLUMBAR SPINE, UNSPECIFIED SCOLIOSIS TYPE: ICD-10-CM

## 2025-07-10 DIAGNOSIS — M54.50 CHRONIC LEFT-SIDED LOW BACK PAIN WITHOUT SCIATICA: ICD-10-CM

## 2025-07-10 DIAGNOSIS — G89.29 CHRONIC LEFT-SIDED LOW BACK PAIN WITHOUT SCIATICA: ICD-10-CM

## 2025-07-10 DIAGNOSIS — G40.A09 NONINTRACTABLE ABSENCE EPILEPSY WITHOUT STATUS EPILEPTICUS (H): ICD-10-CM

## 2025-07-10 DIAGNOSIS — Z76.0 ENCOUNTER FOR MEDICATION REFILL: ICD-10-CM

## 2025-07-10 RX ORDER — CYCLOBENZAPRINE HCL 5 MG
5 TABLET ORAL
Qty: 30 TABLET | Refills: 0 | Status: SHIPPED | OUTPATIENT
Start: 2025-07-10

## 2025-07-10 RX ORDER — CARBAMAZEPINE 200 MG/1
TABLET ORAL
Qty: 180 TABLET | Refills: 2 | Status: SHIPPED | OUTPATIENT
Start: 2025-07-10

## 2025-07-10 NOTE — TELEPHONE ENCOUNTER
Medication Question or Refill    Contacts       Contact Date/Time Type Contact Phone/Fax    07/10/2025 09:43 AM CDT Phone (Incoming) Kerry Reed (Self) [phone] (M)            What medication are you calling about (include dose and sig)?: carBAMazepine 200 MG     Preferred Pharmacy:   [facility] #[id number] - [place], [place] - [address] [place] [address] AT [address]  [address] [place] [address]  Phone: [phone] Fax: [phone]        Controlled Substance Agreement on file:   CSA -- Patient Level:    CSA: None found at the patient level.       Who prescribed the medication?: pcp    Do you need a refill? Yes    When did you use the medication last? N/a    Patient offered an appointment? No    Do you have any questions or concerns?  No  Pt is on the last medication today and needs a refill ASAP     Okay to leave a detailed message?: Yes at Home number on file [phone] (home)

## 2025-07-10 NOTE — TELEPHONE ENCOUNTER
Medication Question or Refill    Contacts       Contact Date/Time Type Contact Phone/Fax    07/10/2025 10:10 AM CDT Phone (Incoming) Kerry Reed (Self) [phone] (M)            What medication are you calling about (include dose and sig)?: cyclobenzaprine (FLEXEril) 5mg tablets    Preferred Pharmacy:   [id number] - [place], [place] - [address]  [address] [place] [address]  Phone: [phone] Fax: [phone]           Controlled Substance Agreement on file:   CSA -- Patient Level:    CSA: None found at the patient level.       Who prescribed the medication?: pcp    Do you need a refill? Yes    When did you use the medication last? Only has 4 left    Patient offered an appointment? No    Do you have any questions or concerns?  No      Okay to leave a detailed message?: Yes at Home number on file [phone] (home)

## 2025-07-13 ASSESSMENT — ENCOUNTER SYMPTOMS
VOMITING: 0
ABDOMINAL PAIN: 0
DYSURIA: 0
HEMATURIA: 0
SORE THROAT: 0
COUGH: 0
FEVER: 0
HEADACHES: 1
EYE PAIN: 0
NECK PAIN: 1
PALPITATIONS: 0
COLOR CHANGE: 0
CHILLS: 0
BACK PAIN: 1
ARTHRALGIAS: 0
SEIZURES: 1
SHORTNESS OF BREATH: 0

## 2025-07-27 DIAGNOSIS — E03.8 OTHER SPECIFIED HYPOTHYROIDISM: ICD-10-CM

## 2025-07-27 RX ORDER — LEVOTHYROXINE SODIUM 50 MCG
50 TABLET ORAL DAILY
Qty: 90 TABLET | Refills: 2 | Status: SHIPPED | OUTPATIENT
Start: 2025-07-27

## 2025-07-28 DIAGNOSIS — R06.02 SHORTNESS OF BREATH: ICD-10-CM

## 2025-07-28 DIAGNOSIS — Z76.0 ENCOUNTER FOR MEDICATION REFILL: ICD-10-CM

## 2025-07-28 RX ORDER — ALBUTEROL SULFATE 90 UG/1
2 AEROSOL, METERED RESPIRATORY (INHALATION) EVERY 4 HOURS PRN
Qty: 18 G | Refills: 1 | Status: SHIPPED | OUTPATIENT
Start: 2025-07-28

## 2025-08-11 DIAGNOSIS — M41.9 SCOLIOSIS OF THORACOLUMBAR SPINE, UNSPECIFIED SCOLIOSIS TYPE: ICD-10-CM

## 2025-08-11 DIAGNOSIS — G89.29 CHRONIC LEFT-SIDED LOW BACK PAIN WITHOUT SCIATICA: ICD-10-CM

## 2025-08-11 DIAGNOSIS — M54.50 CHRONIC LEFT-SIDED LOW BACK PAIN WITHOUT SCIATICA: ICD-10-CM

## 2025-08-12 RX ORDER — METHOCARBAMOL 500 MG/1
500 TABLET, FILM COATED ORAL 4 TIMES DAILY PRN
Qty: 120 TABLET | Refills: 0 | Status: SHIPPED | OUTPATIENT
Start: 2025-08-12

## 2025-08-12 RX ORDER — CYCLOBENZAPRINE HCL 5 MG
5 TABLET ORAL
Qty: 30 TABLET | Refills: 0 | Status: SHIPPED | OUTPATIENT
Start: 2025-08-12

## 2025-08-13 ENCOUNTER — TELEPHONE (OUTPATIENT)
Dept: FAMILY MEDICINE | Facility: CLINIC | Age: 65
End: 2025-08-13
Payer: MEDICARE

## 2025-08-18 ENCOUNTER — PATIENT OUTREACH (OUTPATIENT)
Dept: CARE COORDINATION | Facility: CLINIC | Age: 65
End: 2025-08-18
Payer: MEDICARE

## 2025-08-28 DIAGNOSIS — M54.50 CHRONIC LEFT-SIDED LOW BACK PAIN WITHOUT SCIATICA: ICD-10-CM

## 2025-08-28 DIAGNOSIS — G89.29 CHRONIC LEFT-SIDED LOW BACK PAIN WITHOUT SCIATICA: ICD-10-CM

## 2025-08-28 DIAGNOSIS — M41.9 SCOLIOSIS OF THORACOLUMBAR SPINE, UNSPECIFIED SCOLIOSIS TYPE: ICD-10-CM

## 2025-08-28 RX ORDER — CYCLOBENZAPRINE HCL 5 MG
5 TABLET ORAL
Qty: 30 TABLET | Refills: 0 | Status: SHIPPED | OUTPATIENT
Start: 2025-08-28